# Patient Record
Sex: FEMALE | Race: BLACK OR AFRICAN AMERICAN | URBAN - METROPOLITAN AREA
[De-identification: names, ages, dates, MRNs, and addresses within clinical notes are randomized per-mention and may not be internally consistent; named-entity substitution may affect disease eponyms.]

---

## 2017-01-05 ENCOUNTER — INPATIENT (INPATIENT)
Facility: HOSPITAL | Age: 82
LOS: 5 days | Discharge: HOME HEALTH | End: 2017-01-11
Attending: INTERNAL MEDICINE | Admitting: INTERNAL MEDICINE
Payer: MEDICARE

## 2017-01-05 VITALS
RESPIRATION RATE: 17 BRPM | DIASTOLIC BLOOD PRESSURE: 63 MMHG | TEMPERATURE: 102 F | WEIGHT: 139.99 LBS | HEIGHT: 62 IN | HEART RATE: 115 BPM | OXYGEN SATURATION: 100 % | SYSTOLIC BLOOD PRESSURE: 139 MMHG

## 2017-01-05 DIAGNOSIS — I50.9 HEART FAILURE, UNSPECIFIED: Chronic | ICD-10-CM

## 2017-01-05 DIAGNOSIS — Z98.89 OTHER SPECIFIED POSTPROCEDURAL STATES: Chronic | ICD-10-CM

## 2017-01-05 DIAGNOSIS — E11.9 TYPE 2 DIABETES MELLITUS WITHOUT COMPLICATIONS: Chronic | ICD-10-CM

## 2017-01-05 DIAGNOSIS — Z82.49 FAMILY HISTORY OF ISCHEMIC HEART DISEASE AND OTHER DISEASES OF THE CIRCULATORY SYSTEM: Chronic | ICD-10-CM

## 2017-01-05 LAB
ACETONE SERPL-MCNC: NEGATIVE — SIGNIFICANT CHANGE UP
ALBUMIN SERPL ELPH-MCNC: 3.2 G/DL — LOW (ref 3.3–5)
ALP SERPL-CCNC: 105 U/L — SIGNIFICANT CHANGE UP (ref 40–120)
ALT FLD-CCNC: 17 U/L — SIGNIFICANT CHANGE UP (ref 12–78)
ANION GAP SERPL CALC-SCNC: 6 MMOL/L — SIGNIFICANT CHANGE UP (ref 5–17)
APPEARANCE UR: CLEAR — SIGNIFICANT CHANGE UP
APTT BLD: 35.6 SEC — SIGNIFICANT CHANGE UP (ref 27.5–37.4)
AST SERPL-CCNC: 43 U/L — HIGH (ref 15–37)
BACTERIA # UR AUTO: ABNORMAL
BASE EXCESS BLDA CALC-SCNC: 7.9 MMOL/L — HIGH (ref -2–2)
BILIRUB SERPL-MCNC: 0.5 MG/DL — SIGNIFICANT CHANGE UP (ref 0.2–1.2)
BILIRUB UR-MCNC: NEGATIVE — SIGNIFICANT CHANGE UP
BLOOD GAS COMMENTS: SIGNIFICANT CHANGE UP
BLOOD GAS SOURCE: SIGNIFICANT CHANGE UP
BUN SERPL-MCNC: 31 MG/DL — HIGH (ref 7–23)
CALCIUM SERPL-MCNC: 10.2 MG/DL — HIGH (ref 8.5–10.1)
CHLORIDE SERPL-SCNC: 97 MMOL/L — SIGNIFICANT CHANGE UP (ref 96–108)
CK MB BLD-MCNC: 1.9 % — SIGNIFICANT CHANGE UP (ref 0–3.5)
CK MB CFR SERPL CALC: 0.6 NG/ML — SIGNIFICANT CHANGE UP (ref 0.5–3.6)
CK SERPL-CCNC: 32 U/L — SIGNIFICANT CHANGE UP (ref 26–192)
CO2 SERPL-SCNC: 32 MMOL/L — HIGH (ref 22–31)
COLOR SPEC: YELLOW — SIGNIFICANT CHANGE UP
CREAT SERPL-MCNC: 4.13 MG/DL — HIGH (ref 0.5–1.3)
DIFF PNL FLD: NEGATIVE — SIGNIFICANT CHANGE UP
EPI CELLS # UR: SIGNIFICANT CHANGE UP
GLUCOSE SERPL-MCNC: 437 MG/DL — HIGH (ref 70–99)
GLUCOSE UR QL: 1000 MG/DL
HCO3 BLDA-SCNC: 31 MMOL/L — HIGH (ref 21–29)
HCT VFR BLD CALC: 34.6 % — SIGNIFICANT CHANGE UP (ref 34.5–45)
HGB BLD-MCNC: 11.6 G/DL — SIGNIFICANT CHANGE UP (ref 11.5–15.5)
HOROWITZ INDEX BLDA+IHG-RTO: 21 — SIGNIFICANT CHANGE UP
INR BLD: 1.04 RATIO — SIGNIFICANT CHANGE UP (ref 0.88–1.16)
KETONES UR-MCNC: NEGATIVE — SIGNIFICANT CHANGE UP
LACTATE SERPL-SCNC: 1.4 MMOL/L — SIGNIFICANT CHANGE UP (ref 0.7–2)
LACTATE SERPL-SCNC: 2.4 MMOL/L — HIGH (ref 0.7–2)
LEUKOCYTE ESTERASE UR-ACNC: ABNORMAL
LG PLATELETS BLD QL AUTO: SLIGHT — SIGNIFICANT CHANGE UP
LYMPHOCYTES # BLD AUTO: 8 % — LOW (ref 13–44)
MACROCYTES BLD QL: SLIGHT — SIGNIFICANT CHANGE UP
MCHC RBC-ENTMCNC: 29.3 PG — SIGNIFICANT CHANGE UP (ref 27–34)
MCHC RBC-ENTMCNC: 33.5 GM/DL — SIGNIFICANT CHANGE UP (ref 32–36)
MCV RBC AUTO: 87.5 FL — SIGNIFICANT CHANGE UP (ref 80–100)
MICROCYTES BLD QL: SLIGHT — SIGNIFICANT CHANGE UP
MONOCYTES NFR BLD AUTO: 5 % — SIGNIFICANT CHANGE UP (ref 2–14)
NEUTROPHILS NFR BLD AUTO: 82 % — HIGH (ref 43–77)
NEUTS BAND # BLD: 5 % — SIGNIFICANT CHANGE UP (ref 0–8)
NEUTS VAC BLD QL SMEAR: SLIGHT — SIGNIFICANT CHANGE UP
NITRITE UR-MCNC: NEGATIVE — SIGNIFICANT CHANGE UP
OVALOCYTES BLD QL SMEAR: SLIGHT — SIGNIFICANT CHANGE UP
PCO2 BLDA: 38 MMHG — SIGNIFICANT CHANGE UP (ref 32–46)
PH BLD: 7.53 — HIGH (ref 7.35–7.45)
PH UR: 8 — SIGNIFICANT CHANGE UP (ref 4.8–8)
PLAT MORPH BLD: NORMAL — SIGNIFICANT CHANGE UP
PLATELET # BLD AUTO: 245 K/UL — SIGNIFICANT CHANGE UP (ref 150–400)
PO2 BLDA: 70 MMHG — LOW (ref 74–108)
POLYCHROMASIA BLD QL SMEAR: SLIGHT — SIGNIFICANT CHANGE UP
POTASSIUM SERPL-MCNC: 4.2 MMOL/L — SIGNIFICANT CHANGE UP (ref 3.5–5.3)
POTASSIUM SERPL-SCNC: 4.2 MMOL/L — SIGNIFICANT CHANGE UP (ref 3.5–5.3)
PROT SERPL-MCNC: 8 GM/DL — SIGNIFICANT CHANGE UP (ref 6–8.3)
PROT UR-MCNC: 100 MG/DL
PROTHROM AB SERPL-ACNC: 11.6 SEC — SIGNIFICANT CHANGE UP (ref 10–13.1)
RBC # BLD: 3.95 M/UL — SIGNIFICANT CHANGE UP (ref 3.8–5.2)
RBC # FLD: 15.3 % — HIGH (ref 11–15)
RBC BLD AUTO: ABNORMAL
RBC CASTS # UR COMP ASSIST: NEGATIVE /HPF — SIGNIFICANT CHANGE UP (ref 0–4)
SAO2 % BLDA: 94 % — SIGNIFICANT CHANGE UP (ref 92–96)
SODIUM SERPL-SCNC: 135 MMOL/L — SIGNIFICANT CHANGE UP (ref 135–145)
SP GR SPEC: 1.01 — SIGNIFICANT CHANGE UP (ref 1.01–1.02)
TOXIC GRANULES BLD QL SMEAR: PRESENT — SIGNIFICANT CHANGE UP
TROPONIN I SERPL-MCNC: 0.11 NG/ML — HIGH (ref 0.01–0.04)
UROBILINOGEN FLD QL: NEGATIVE MG/DL — SIGNIFICANT CHANGE UP
WBC # BLD: 20 K/UL — HIGH (ref 3.8–10.5)
WBC # FLD AUTO: 20 K/UL — HIGH (ref 3.8–10.5)
WBC UR QL: SIGNIFICANT CHANGE UP

## 2017-01-05 PROCEDURE — 99285 EMERGENCY DEPT VISIT HI MDM: CPT

## 2017-01-05 PROCEDURE — 71010: CPT | Mod: 26

## 2017-01-05 PROCEDURE — 70450 CT HEAD/BRAIN W/O DYE: CPT | Mod: 26

## 2017-01-05 RX ORDER — PANTOPRAZOLE SODIUM 20 MG/1
40 TABLET, DELAYED RELEASE ORAL DAILY
Qty: 0 | Refills: 0 | Status: DISCONTINUED | OUTPATIENT
Start: 2017-01-05 | End: 2017-01-08

## 2017-01-05 RX ORDER — SODIUM CHLORIDE 9 MG/ML
1000 INJECTION INTRAMUSCULAR; INTRAVENOUS; SUBCUTANEOUS
Qty: 0 | Refills: 0 | Status: DISCONTINUED | OUTPATIENT
Start: 2017-01-05 | End: 2017-01-06

## 2017-01-05 RX ORDER — VANCOMYCIN HCL 1 G
500 VIAL (EA) INTRAVENOUS ONCE
Qty: 0 | Refills: 0 | Status: DISCONTINUED | OUTPATIENT
Start: 2017-01-05 | End: 2017-01-05

## 2017-01-05 RX ORDER — DEXTROSE 50 % IN WATER 50 %
25 SYRINGE (ML) INTRAVENOUS ONCE
Qty: 0 | Refills: 0 | Status: DISCONTINUED | OUTPATIENT
Start: 2017-01-05 | End: 2017-01-11

## 2017-01-05 RX ORDER — INSULIN LISPRO 100/ML
VIAL (ML) SUBCUTANEOUS
Qty: 0 | Refills: 0 | Status: DISCONTINUED | OUTPATIENT
Start: 2017-01-05 | End: 2017-01-11

## 2017-01-05 RX ORDER — ACETAMINOPHEN 500 MG
975 TABLET ORAL ONCE
Qty: 0 | Refills: 0 | Status: COMPLETED | OUTPATIENT
Start: 2017-01-05 | End: 2017-01-05

## 2017-01-05 RX ORDER — GLUCAGON INJECTION, SOLUTION 0.5 MG/.1ML
1 INJECTION, SOLUTION SUBCUTANEOUS ONCE
Qty: 0 | Refills: 0 | Status: DISCONTINUED | OUTPATIENT
Start: 2017-01-05 | End: 2017-01-11

## 2017-01-05 RX ORDER — PIPERACILLIN AND TAZOBACTAM 4; .5 G/20ML; G/20ML
3.38 INJECTION, POWDER, LYOPHILIZED, FOR SOLUTION INTRAVENOUS ONCE
Qty: 0 | Refills: 0 | Status: COMPLETED | OUTPATIENT
Start: 2017-01-05 | End: 2017-01-05

## 2017-01-05 RX ORDER — PIPERACILLIN AND TAZOBACTAM 4; .5 G/20ML; G/20ML
3.38 INJECTION, POWDER, LYOPHILIZED, FOR SOLUTION INTRAVENOUS EVERY 12 HOURS
Qty: 0 | Refills: 0 | Status: DISCONTINUED | OUTPATIENT
Start: 2017-01-05 | End: 2017-01-11

## 2017-01-05 RX ORDER — SODIUM CHLORIDE 9 MG/ML
3 INJECTION INTRAMUSCULAR; INTRAVENOUS; SUBCUTANEOUS ONCE
Qty: 0 | Refills: 0 | Status: COMPLETED | OUTPATIENT
Start: 2017-01-05 | End: 2017-01-05

## 2017-01-05 RX ORDER — HEPARIN SODIUM 5000 [USP'U]/ML
5000 INJECTION INTRAVENOUS; SUBCUTANEOUS EVERY 12 HOURS
Qty: 0 | Refills: 0 | Status: DISCONTINUED | OUTPATIENT
Start: 2017-01-05 | End: 2017-01-11

## 2017-01-05 RX ORDER — MORPHINE SULFATE 50 MG/1
2 CAPSULE, EXTENDED RELEASE ORAL ONCE
Qty: 0 | Refills: 0 | Status: DISCONTINUED | OUTPATIENT
Start: 2017-01-05 | End: 2017-01-05

## 2017-01-05 RX ORDER — DEXTROSE 50 % IN WATER 50 %
12.5 SYRINGE (ML) INTRAVENOUS ONCE
Qty: 0 | Refills: 0 | Status: DISCONTINUED | OUTPATIENT
Start: 2017-01-05 | End: 2017-01-11

## 2017-01-05 RX ORDER — SODIUM CHLORIDE 9 MG/ML
1000 INJECTION INTRAMUSCULAR; INTRAVENOUS; SUBCUTANEOUS ONCE
Qty: 0 | Refills: 0 | Status: COMPLETED | OUTPATIENT
Start: 2017-01-05 | End: 2017-01-05

## 2017-01-05 RX ORDER — ACETAMINOPHEN 500 MG
650 TABLET ORAL EVERY 6 HOURS
Qty: 0 | Refills: 0 | Status: DISCONTINUED | OUTPATIENT
Start: 2017-01-05 | End: 2017-01-11

## 2017-01-05 RX ORDER — VANCOMYCIN HCL 1 G
1000 VIAL (EA) INTRAVENOUS ONCE
Qty: 0 | Refills: 0 | Status: COMPLETED | OUTPATIENT
Start: 2017-01-05 | End: 2017-01-05

## 2017-01-05 RX ORDER — DEXTROSE 50 % IN WATER 50 %
1 SYRINGE (ML) INTRAVENOUS ONCE
Qty: 0 | Refills: 0 | Status: DISCONTINUED | OUTPATIENT
Start: 2017-01-05 | End: 2017-01-11

## 2017-01-05 RX ORDER — SODIUM CHLORIDE 9 MG/ML
1000 INJECTION, SOLUTION INTRAVENOUS
Qty: 0 | Refills: 0 | Status: DISCONTINUED | OUTPATIENT
Start: 2017-01-05 | End: 2017-01-11

## 2017-01-05 RX ORDER — ONDANSETRON 8 MG/1
8 TABLET, FILM COATED ORAL ONCE
Qty: 0 | Refills: 0 | Status: COMPLETED | OUTPATIENT
Start: 2017-01-05 | End: 2017-01-05

## 2017-01-05 RX ADMIN — ONDANSETRON 8 MILLIGRAM(S): 8 TABLET, FILM COATED ORAL at 15:13

## 2017-01-05 RX ADMIN — MORPHINE SULFATE 2 MILLIGRAM(S): 50 CAPSULE, EXTENDED RELEASE ORAL at 15:43

## 2017-01-05 RX ADMIN — MORPHINE SULFATE 2 MILLIGRAM(S): 50 CAPSULE, EXTENDED RELEASE ORAL at 15:13

## 2017-01-05 RX ADMIN — SODIUM CHLORIDE 3 MILLILITER(S): 9 INJECTION INTRAMUSCULAR; INTRAVENOUS; SUBCUTANEOUS at 13:50

## 2017-01-05 RX ADMIN — SODIUM CHLORIDE 60 MILLILITER(S): 9 INJECTION INTRAMUSCULAR; INTRAVENOUS; SUBCUTANEOUS at 23:12

## 2017-01-05 RX ADMIN — Medication 975 MILLIGRAM(S): at 17:18

## 2017-01-05 RX ADMIN — SODIUM CHLORIDE 1000 MILLILITER(S): 9 INJECTION INTRAMUSCULAR; INTRAVENOUS; SUBCUTANEOUS at 13:50

## 2017-01-05 RX ADMIN — PIPERACILLIN AND TAZOBACTAM 25 GRAM(S): 4; .5 INJECTION, POWDER, LYOPHILIZED, FOR SOLUTION INTRAVENOUS at 21:28

## 2017-01-05 RX ADMIN — Medication 250 MILLIGRAM(S): at 17:03

## 2017-01-05 RX ADMIN — PIPERACILLIN AND TAZOBACTAM 200 GRAM(S): 4; .5 INJECTION, POWDER, LYOPHILIZED, FOR SOLUTION INTRAVENOUS at 15:13

## 2017-01-05 NOTE — ED ADULT NURSE REASSESSMENT NOTE - NS ED NURSE REASSESS COMMENT FT1
Pt is a dialysis pt with AV shunt on left arm.  EMS inserted IV access on left arm.  Informed Dr. Flores of IV access.  Per Dr. Flores, I can administer antibiotic via IV access on left arm.

## 2017-01-05 NOTE — ED STATDOCS - OBJECTIVE STATEMENT
86 year old female presents with h/o HTN, DM, hypercholesterolemia, CHF, ARF presents today BIBA from home c/o not being herself, pt had dialysis yesterday and able to eat dinner, this morning her home aide came in at 8:50am and noticed that she was not talking just mumbling, she did call her daughter who tried to speak to her on the phone and agreed she did not sound like herself, she got home at 9:30am and noticed the pt was also lethargic and felt warm when she sat next to her, she watched her at home for few hours,

## 2017-01-05 NOTE — ED ADULT NURSE NOTE - PMH
Acute Renal Insufficiency    CHF (Congestive Heart Failure)    Diabetes Mellitus    Dyslipidemia    H/O: Hypothyroidism    Hypertension    Seizure

## 2017-01-05 NOTE — ED ADULT TRIAGE NOTE - CHIEF COMPLAINT QUOTE
AMS since this morning upon waking up per EMS stated by family. Pt received dialysis yesterday blood sugar after.  per EMS

## 2017-01-05 NOTE — ED PROVIDER NOTE - OBJECTIVE STATEMENT
86 year old female presents today BIBA from home accompanied with her daughter and home aide c/o pt not being herself, the aide called the daugter at 8:50am informing her that she was not responding, pt normally is able to verbalize, her daughter got to the home at 9:30am and found her mother to be lethargic and not answering questions, pt is normally verbal, she observed her for the next few hours, she did not improve, her daughter noticed that she was hot (-) vomiting (-) diarrhea, pt was her normal self yesterday, she had her dialysis and able to eat dinner

## 2017-01-06 DIAGNOSIS — R10.10 UPPER ABDOMINAL PAIN, UNSPECIFIED: ICD-10-CM

## 2017-01-06 DIAGNOSIS — N18.6 END STAGE RENAL DISEASE: ICD-10-CM

## 2017-01-06 DIAGNOSIS — E13.00 OTHER SPECIFIED DIABETES MELLITUS WITH HYPEROSMOLARITY WITHOUT NONKETOTIC HYPERGLYCEMIC-HYPEROSMOLAR COMA (NKHHC): ICD-10-CM

## 2017-01-06 DIAGNOSIS — A41.9 SEPSIS, UNSPECIFIED ORGANISM: ICD-10-CM

## 2017-01-06 DIAGNOSIS — R56.9 UNSPECIFIED CONVULSIONS: ICD-10-CM

## 2017-01-06 DIAGNOSIS — R50.9 FEVER, UNSPECIFIED: ICD-10-CM

## 2017-01-06 LAB
ANION GAP SERPL CALC-SCNC: 11 MMOL/L — SIGNIFICANT CHANGE UP (ref 5–17)
BASOPHILS # BLD AUTO: 0.1 K/UL — SIGNIFICANT CHANGE UP (ref 0–0.2)
BASOPHILS NFR BLD AUTO: 0.3 % — SIGNIFICANT CHANGE UP (ref 0–2)
BUN SERPL-MCNC: 39 MG/DL — HIGH (ref 7–23)
CALCIUM SERPL-MCNC: 10 MG/DL — SIGNIFICANT CHANGE UP (ref 8.5–10.1)
CHLORIDE SERPL-SCNC: 102 MMOL/L — SIGNIFICANT CHANGE UP (ref 96–108)
CO2 SERPL-SCNC: 29 MMOL/L — SIGNIFICANT CHANGE UP (ref 22–31)
CREAT SERPL-MCNC: 4.48 MG/DL — HIGH (ref 0.5–1.3)
EOSINOPHIL # BLD AUTO: 0.3 K/UL — SIGNIFICANT CHANGE UP (ref 0–0.5)
EOSINOPHIL NFR BLD AUTO: 1.3 % — SIGNIFICANT CHANGE UP (ref 0–6)
GLUCOSE SERPL-MCNC: 154 MG/DL — HIGH (ref 70–99)
HCT VFR BLD CALC: 29.8 % — LOW (ref 34.5–45)
HGB BLD-MCNC: 9.9 G/DL — LOW (ref 11.5–15.5)
LYMPHOCYTES # BLD AUTO: 1.5 K/UL — SIGNIFICANT CHANGE UP (ref 1–3.3)
LYMPHOCYTES # BLD AUTO: 7.2 % — LOW (ref 13–44)
MCHC RBC-ENTMCNC: 29.4 PG — SIGNIFICANT CHANGE UP (ref 27–34)
MCHC RBC-ENTMCNC: 33.2 GM/DL — SIGNIFICANT CHANGE UP (ref 32–36)
MCV RBC AUTO: 88.6 FL — SIGNIFICANT CHANGE UP (ref 80–100)
MONOCYTES # BLD AUTO: 1 K/UL — HIGH (ref 0–0.9)
MONOCYTES NFR BLD AUTO: 4.7 % — SIGNIFICANT CHANGE UP (ref 2–14)
NEUTROPHILS # BLD AUTO: 17.9 K/UL — HIGH (ref 1.8–7.4)
NEUTROPHILS NFR BLD AUTO: 86.4 % — HIGH (ref 43–77)
PLATELET # BLD AUTO: 212 K/UL — SIGNIFICANT CHANGE UP (ref 150–400)
POTASSIUM SERPL-MCNC: 3.9 MMOL/L — SIGNIFICANT CHANGE UP (ref 3.5–5.3)
POTASSIUM SERPL-SCNC: 3.9 MMOL/L — SIGNIFICANT CHANGE UP (ref 3.5–5.3)
PROCALCITONIN SERPL-MCNC: 1.17 NG/ML — HIGH (ref 0–0.05)
RBC # BLD: 3.37 M/UL — LOW (ref 3.8–5.2)
RBC # FLD: 15.5 % — HIGH (ref 11–15)
SODIUM SERPL-SCNC: 142 MMOL/L — SIGNIFICANT CHANGE UP (ref 135–145)
WBC # BLD: 20.7 K/UL — HIGH (ref 3.8–10.5)
WBC # FLD AUTO: 20.7 K/UL — HIGH (ref 3.8–10.5)

## 2017-01-06 PROCEDURE — 74176 CT ABD & PELVIS W/O CONTRAST: CPT | Mod: 26

## 2017-01-06 RX ORDER — IOHEXOL 300 MG/ML
30 INJECTION, SOLUTION INTRAVENOUS ONCE
Qty: 0 | Refills: 0 | Status: COMPLETED | OUTPATIENT
Start: 2017-01-06 | End: 2017-01-06

## 2017-01-06 RX ORDER — CLOPIDOGREL BISULFATE 75 MG/1
75 TABLET, FILM COATED ORAL DAILY
Qty: 0 | Refills: 0 | Status: DISCONTINUED | OUTPATIENT
Start: 2017-01-06 | End: 2017-01-11

## 2017-01-06 RX ORDER — LEVETIRACETAM 250 MG/1
750 TABLET, FILM COATED ORAL
Qty: 0 | Refills: 0 | Status: DISCONTINUED | OUTPATIENT
Start: 2017-01-06 | End: 2017-01-11

## 2017-01-06 RX ORDER — SODIUM CHLORIDE 9 MG/ML
1000 INJECTION, SOLUTION INTRAVENOUS
Qty: 0 | Refills: 0 | Status: DISCONTINUED | OUTPATIENT
Start: 2017-01-06 | End: 2017-01-11

## 2017-01-06 RX ADMIN — LEVETIRACETAM 750 MILLIGRAM(S): 250 TABLET, FILM COATED ORAL at 18:37

## 2017-01-06 RX ADMIN — HEPARIN SODIUM 5000 UNIT(S): 5000 INJECTION INTRAVENOUS; SUBCUTANEOUS at 05:06

## 2017-01-06 RX ADMIN — Medication 2: at 18:24

## 2017-01-06 RX ADMIN — HEPARIN SODIUM 5000 UNIT(S): 5000 INJECTION INTRAVENOUS; SUBCUTANEOUS at 18:24

## 2017-01-06 RX ADMIN — PIPERACILLIN AND TAZOBACTAM 25 GRAM(S): 4; .5 INJECTION, POWDER, LYOPHILIZED, FOR SOLUTION INTRAVENOUS at 10:14

## 2017-01-06 RX ADMIN — SODIUM CHLORIDE 60 MILLILITER(S): 9 INJECTION INTRAMUSCULAR; INTRAVENOUS; SUBCUTANEOUS at 05:05

## 2017-01-06 RX ADMIN — IOHEXOL 30 MILLILITER(S): 300 INJECTION, SOLUTION INTRAVENOUS at 20:43

## 2017-01-06 RX ADMIN — PIPERACILLIN AND TAZOBACTAM 25 GRAM(S): 4; .5 INJECTION, POWDER, LYOPHILIZED, FOR SOLUTION INTRAVENOUS at 20:45

## 2017-01-06 RX ADMIN — CLOPIDOGREL BISULFATE 75 MILLIGRAM(S): 75 TABLET, FILM COATED ORAL at 18:36

## 2017-01-06 RX ADMIN — PANTOPRAZOLE SODIUM 40 MILLIGRAM(S): 20 TABLET, DELAYED RELEASE ORAL at 11:23

## 2017-01-06 NOTE — CONSULT NOTE ADULT - SUBJECTIVE AND OBJECTIVE BOX
HPI:  88yo, BF with h/o ESRD 0n dialysis, Sz, DM2,HTN.   BIBA from home accompanied with her daughter and home aide c/o pt not being herself, the aide called the daugter at 8:50am informing her that she was not responding, pt normally is able to verbalize, her daughter got to the home at 9:30am and found her mother to be lethargic and not answering questions, pt is normally verbal, she observed her for the next few hours, she did not improve.  Daughter does not note  any cough, fever, chills, sob, cp.  In ER, patient lethargic with wbc 20.     PAST MEDICAL & SURGICAL HISTORY:  Seizure  H/O: Hypothyroidism  Hypertension  Acute Renal Insufficiency  CHF (Congestive Heart Failure)  Diabetes Mellitus  Dyslipidemia  S/P cardiac cath: 1 stent 10 years ago  Diabetes  FH: HTN (hypertension)  Acute CHF  No Past Surgical History      SOCHX:  no tobacco,  -no  alcohol    FMHX: FA/MO  -non contributory       Recent Travel:    Immunizations:    Allergies    No Known Allergies    Intolerances        MEDICATIONS  (STANDING):  heparin  Injectable 5000Unit(s) SubCutaneous every 12 hours  insulin lispro (HumaLOG) corrective regimen sliding scale  SubCutaneous three times a day before meals  dextrose 5%. 1000milliLiter(s) IV Continuous <Continuous>  dextrose 50% Injectable 12.5Gram(s) IV Push once  dextrose 50% Injectable 25Gram(s) IV Push once  piperacillin/tazobactam IVPB. 3.375Gram(s) IV Intermittent every 12 hours  pantoprazole  Injectable 40milliGRAM(s) IV Push daily  sodium chloride 0.45%. 1000milliLiter(s) IV Continuous <Continuous>  clopidogrel Tablet 75milliGRAM(s) Oral daily  levETIRAcetam 750milliGRAM(s) Oral two times a day    MEDICATIONS  (PRN):  acetaminophen  Suppository 650milliGRAM(s) Rectal every 6 hours PRN For Temp greater than 38 C (100.4 F)  dextrose Gel 1Dose(s) Oral once PRN Blood Glucose LESS THAN 70 milliGRAM(s)/deciliter  glucagon  Injectable 1milliGRAM(s) IntraMuscular once PRN Glucose LESS THAN 70 milligrams/deciliter      REVIEW OF SYSTEMS:  CONSTITUTIONAL: No fever, weight loss, or fatigue  EYES: No eye pain, visual disturbances, or discharge  ENMT:  No difficulty hearing, tinnitus, vertigo; No sinus or throat pain  NECK: No pain or stiffness  BREASTS: No pain, masses, or nipple discharge  RESPIRATORY: No cough, wheezing, chills or hemoptysis; No shortness of breath  CARDIOVASCULAR: No chest pain, palpitations, dizziness, or leg swelling  GASTROINTESTINAL: No abdominal or epigastric pain. No nausea, vomiting, or hematemesis; No diarrhea or constipation. No melena or hematochezia.  GENITOURINARY: No dysuria, frequency, hematuria, or incontinence  NEUROLOGICAL: No headaches, memory loss, loss of strength, numbness, or tremors  SKIN: No itching, burning, rashes, or lesions   LYMPH NODES: No enlarged glands  ENDOCRINE: No heat or cold intolerance; No hair loss  MUSCULOSKELETAL: No joint pain or swelling; No muscle, back, or extremity pain  PSYCHIATRIC: No depression, anxiety, mood swings, or difficulty sleeping  HEME/LYMPH: No easy bruising, or bleeding gums  ALLERGY AND IMMUNOLOGIC: No hives or eczema    VITAL SIGNS:    T(C): 36.7, Max: 37.7 ( @ 02:00)  T(F): 98, Max: 99.8 ( @ 02:00)  HR: 86 (80 - 90)  BP: 149/56 (142/58 - 153/57)  RR: 16 (16 - 18)  SpO2: 100% (98% - 100%)  Wt(kg): --    PHYSICAL EXAM:    GENERAL: NAD, well-groomed, well-developed  HEAD:  Atraumatic, Normocephalic  EYES: EOMI, PERRLA, conjunctiva and sclera clear  ENMT: No tonsillar erythema, exudates, or enlargement; Moist mucous membranes, Good dentition, No lesions  NECK: Supple, No JVD, Normal thyroid  NERVOUS SYSTEM:  Alert & Oriented X3, Good concentration; Motor Strength 5/5 B/L upper and lower extremities; DTRs 2+ intact and symmetric  CHEST/LUNG: Clear to percussion bilaterally; No rales, rhonchi, wheezing bilaterally  HEART: Regular rate and rhythm; No murmurs, rubs, or gallops  ABDOMEN: Soft, Nontender, Nondistended; Bowel sounds present  EXTREMITIES:  2+ Peripheral Pulses, No clubbing, cyanosis, or edema  LYMPH: No lymphadenopathy noted  SKIN: No rashes or lesions  BACK: no pressor sore     LABS:                         9.9    20.7  )-----------( 212      ( 2017 07:38 )             29.8     2017 07:38    142    |  102    |  39     ----------------------------<  154    3.9     |  29     |  4.48     Ca    10.0       2017 07:38    TPro  8.0    /  Alb  3.2    /  TBili  0.5    /  DBili  x      /  AST  43     /  ALT  17     /  AlkPhos  105    2017 14:15    LIVER FUNCTIONS - ( 2017 14:15 )  Alb: 3.2 g/dL / Pro: 8.0 gm/dL / ALK PHOS: 105 U/L / ALT: 17 U/L / AST: 43 U/L / GGT: x           PT/INR - ( 2017 14:15 )   PT: 11.6 sec;   INR: 1.04 ratio         PTT - ( 2017 14:15 )  PTT:35.6 sec  Urinalysis Basic - ( 2017 21:42 )    Color: Yellow / Appearance: Clear / S.010 / pH: x  Gluc: x / Ketone: Negative  / Bili: Negative / Urobili: Negative mg/dL   Blood: x / Protein: 100 mg/dL / Nitrite: Negative   Leuk Esterase: Trace / RBC: Negative /HPF / WBC 3-5   Sq Epi: x / Non Sq Epi: Occasional / Bacteria: Few      ABG - ( 2017 14:42 )  pH: x     /  pCO2: 38    /  pO2: 70    / HCO3: 31    / Base Excess: 7.9   /  SaO2: 94                CARDIAC MARKERS ( 2017 16:27 )  .112 ng/mL / x     / 32 U/L / x     / 0.6 ng/mL                                  Radiology: HPI:  88yo, BF with h/o ESRD 0n dialysis, Sz, DM2,HTN.   BIBA from home accompanied with her daughter and home aide c/o pt not being herself, the aide called the daugter at 8:50am informing her that she was not responding, pt normally is able to verbalize, her daughter got to the home at 9:30am and found her mother to be lethargic and not answering questions, pt is normally verbal, she observed her for the next few hours, she did not improve.  Daughter does not note  any cough, fever, chills, sob, cp.  In ER, patient lethargic with wbc 20.     PAST MEDICAL & SURGICAL HISTORY:  Seizure  H/O: Hypothyroidism  Hypertension  Acute Renal Insufficiency  CHF (Congestive Heart Failure)  Diabetes Mellitus  Dyslipidemia  S/P cardiac cath: 1 stent 10 years ago  Diabetes  FH: HTN (hypertension)  Acute CHF  No Past Surgical History      SOCHX:  no tobacco,  -no  alcohol    FMHX: FA/MO  -non contributory       Recent Travel:    Immunizations:    Allergies    No Known Allergies    Intolerances        MEDICATIONS  (STANDING):  heparin  Injectable 5000Unit(s) SubCutaneous every 12 hours  insulin lispro (HumaLOG) corrective regimen sliding scale  SubCutaneous three times a day before meals  dextrose 5%. 1000milliLiter(s) IV Continuous <Continuous>  dextrose 50% Injectable 12.5Gram(s) IV Push once  dextrose 50% Injectable 25Gram(s) IV Push once  piperacillin/tazobactam IVPB. 3.375Gram(s) IV Intermittent every 12 hours  pantoprazole  Injectable 40milliGRAM(s) IV Push daily  sodium chloride 0.45%. 1000milliLiter(s) IV Continuous <Continuous>  clopidogrel Tablet 75milliGRAM(s) Oral daily  levETIRAcetam 750milliGRAM(s) Oral two times a day    MEDICATIONS  (PRN):  acetaminophen  Suppository 650milliGRAM(s) Rectal every 6 hours PRN For Temp greater than 38 C (100.4 F)  dextrose Gel 1Dose(s) Oral once PRN Blood Glucose LESS THAN 70 milliGRAM(s)/deciliter  glucagon  Injectable 1milliGRAM(s) IntraMuscular once PRN Glucose LESS THAN 70 milligrams/deciliter      REVIEW OF SYSTEMS:  unable to obtain   from patient     VITAL SIGNS:    T(C): 36.7, Max: 37.7 ( @ 02:00)  T(F): 98, Max: 99.8 ( @ 02:00)  HR: 86 (80 - 90)  BP: 149/56 (142/58 - 153/57)  RR: 16 (16 - 18)  SpO2: 100% (98% - 100%)  Wt(kg): --    PHYSICAL EXAM:  appears in pain  GENERAL: NAD, well-groomed, well-developed  HEAD:  Atraumatic, Normocephalic  EYES: EOMI, PERRLA, conjunctiva and sclera clear  ENMT: No tonsillar erythema, exudates, or enlargement; Moist mucous membranes  NECK: Supple, No JVD, Normal thyroid  NERVOUS SYSTEM:  Alert ,  CHEST/LUNG: Clear to percussion bilaterally; No rales, rhonchi, wheezing bilaterally  HEART: Regular rate and rhythm; No murmurs, rubs, or gallops  ABDOMEN:  firm tender, distended; Bowel sounds plus  EXTREMITIES:  2+ Peripheral Pulses, No clubbing, cyanosis, or edema  LYMPH: No lymphadenopathy noted  SKIN: No rashes or lesions  BACK: no pressor sore     LABS:                         9.9    20.7  )-----------( 212      ( 2017 07:38 )             29.8     2017 07:38    142    |  102    |  39     ----------------------------<  154    3.9     |  29     |  4.48     Ca    10.0       2017 07:38    TPro  8.0    /  Alb  3.2    /  TBili  0.5    /  DBili  x      /  AST  43     /  ALT  17     /  AlkPhos  105    2017 14:15    LIVER FUNCTIONS - ( 2017 14:15 )  Alb: 3.2 g/dL / Pro: 8.0 gm/dL / ALK PHOS: 105 U/L / ALT: 17 U/L / AST: 43 U/L / GGT: x           PT/INR - ( 2017 14:15 )   PT: 11.6 sec;   INR: 1.04 ratio         PTT - ( 2017 14:15 )  PTT:35.6 sec  Urinalysis Basic - ( 2017 21:42 )    Color: Yellow / Appearance: Clear / S.010 / pH: x  Gluc: x / Ketone: Negative  / Bili: Negative / Urobili: Negative mg/dL   Blood: x / Protein: 100 mg/dL / Nitrite: Negative   Leuk Esterase: Trace / RBC: Negative /HPF / WBC 3-5   Sq Epi: x / Non Sq Epi: Occasional / Bacteria: Few      ABG - ( 2017 14:42 )  pH: x     /  pCO2: 38    /  pO2: 70    / HCO3: 31    / Base Excess: 7.9   /  SaO2: 94                CARDIAC MARKERS ( 2017 16:27 )  .112 ng/mL / x     / 32 U/L / x     / 0.6 ng/mL                                  Radiology:

## 2017-01-06 NOTE — DIETITIAN INITIAL EVALUATION ADULT. - PERTINENT LABORATORY DATA
01-06 Na142 mmol/L Glu 154 mg/dL<H> K+ 3.9 mmol/L Cr  4.48 mg/dL<H> BUN 39 mg/dL<H> Phos n/a   Alb n/a   PAB n/a; GFR 10L

## 2017-01-06 NOTE — CONSULT NOTE ADULT - SUBJECTIVE AND OBJECTIVE BOX
Patient is a 86y old  Female who presents with a chief complaint of weakness, fatigue, hyperglycemia  HPI:  86yo, BF with h/o ESRD 0n dialysis, Sz, DM2,HTN.  Known to me, seen at HD last wednesday without incident,  BIBA from home accompanied with her daughter and home aide c/o pt not being herself, the aide called the daugter at 8:50am informing her that she was not responding, pt normally is able to verbalize, her daughter got to the home at 9:30am and found her mother to be lethargic and not answering questions, pt is normally verbal, she observed her for the next few hours, she did not improve.  Daughter does not note  any cough, fever, chills, sob, cp.  In ER, patient lethargic with wbc 20. (2017 12:54)    PAST MEDICAL & SURGICAL HISTORY:  Seizure  H/O: Hypothyroidism  Hypertension  Acute Renal Insufficiency  CHF (Congestive Heart Failure)  Diabetes Mellitus  Dyslipidemia  S/P cardiac cath: 1 stent 10 years ago  Diabetes  FH: HTN (hypertension)  Acute CHF  No Past Surgical History    FAMILY HISTORY:    No Known Allergies    MEDICATIONS  (STANDING):  heparin  Injectable 5000Unit(s) SubCutaneous every 12 hours  insulin lispro (HumaLOG) corrective regimen sliding scale  SubCutaneous three times a day before meals  dextrose 5%. 1000milliLiter(s) IV Continuous <Continuous>  dextrose 50% Injectable 12.5Gram(s) IV Push once  dextrose 50% Injectable 25Gram(s) IV Push once  piperacillin/tazobactam IVPB. 3.375Gram(s) IV Intermittent every 12 hours  pantoprazole  Injectable 40milliGRAM(s) IV Push daily  sodium chloride 0.45%. 1000milliLiter(s) IV Continuous <Continuous>  clopidogrel Tablet 75milliGRAM(s) Oral daily  levETIRAcetam 750milliGRAM(s) Oral two times a day    MEDICATIONS  (PRN):  acetaminophen  Suppository 650milliGRAM(s) Rectal every 6 hours PRN For Temp greater than 38 C (100.4 F)  dextrose Gel 1Dose(s) Oral once PRN Blood Glucose LESS THAN 70 milliGRAM(s)/deciliter  glucagon  Injectable 1milliGRAM(s) IntraMuscular once PRN Glucose LESS THAN 70 milligrams/deciliter    Vital Signs Last 24 Hrs  T(C): 36.7, Max: 37.7 ( @ 02:00)  T(F): 98, Max: 99.8 ( @ 02:00)  HR: 86 (80 - 90)  BP: 149/56 (142/58 - 153/57)  BP(mean): --  RR: 16 (16 - 18)  SpO2: 100% (98% - 100%)    CAPILLARY BLOOD GLUCOSE  131 (2017 11:16)  161 (2017 06:04)  243 (2017 00:40)    PHYSICAL EXAM:      T(C): 36.7, Max: 37.7 ( @ 02:00)  HR: 86 (80 - 90)  BP: 149/56 (142/58 - 153/57)  RR: 16 (16 - 18)  SpO2: 100% (98% - 100%)  Wt(kg): --  Respiratory: clear anteriorly, decreased BS at bases  Cardiovascular: S1 S2  Gastrointestinal: soft NT ND +BS  Extremities:    tr edema LUE avf + b/t              2017 07:38    142    |  102    |  39     ----------------------------<  154    3.9     |  29     |  4.48     Ca    10.0       2017 07:38    TPro  8.0    /  Alb  3.2    /  TBili  0.5    /  DBili  x      /  AST  43     /  ALT  17     /  AlkPhos  105    2017 14:15                          9.9    20.7  )-----------( 212      ( 2017 07:38 )             29.8     Urinalysis Basic - ( 2017 21:42 )    Color: Yellow / Appearance: Clear / S.010 / pH: x  Gluc: x / Ketone: Negative  / Bili: Negative / Urobili: Negative mg/dL   Blood: x / Protein: 100 mg/dL / Nitrite: Negative   Leuk Esterase: Trace / RBC: Negative /HPF / WBC 3-5   Sq Epi: x / Non Sq Epi: Occasional / Bacteria: Few      ABG - ( 2017 14:42 )  pH: x     /  pCO2: 38    /  pO2: 70    / HCO3: 31    / Base Excess: 7.9   /  SaO2: 94            Assessment and Plan    Hyperglycemia, leukocytosis, sepsis; ESRD.   Will hold HD today.  IV abx,; cultures pending; neuro evaluation.  D/C brenda. Patient is a 86y old  Female who presents with a chief complaint of weakness, fatigue, hyperglycemia  HPI:  88yo, BF with h/o ESRD 0n dialysis, Sz, DM2,HTN.  Known to me, seen at HD last wednesday without incident,  BIBA from home accompanied with her daughter and home aide c/o pt not being herself, the aide called the daugter at 8:50am informing her that she was not responding, pt normally is able to verbalize, her daughter got to the home at 9:30am and found her mother to be lethargic and not answering questions, pt is normally verbal, she observed her for the next few hours, she did not improve.  Daughter does not note  any cough, fever, chills, sob, cp.  In ER, patient lethargic with wbc 20. (2017 12:54)    PAST MEDICAL & SURGICAL HISTORY:  Seizure  H/O: Hypothyroidism  Hypertension  Acute Renal Insufficiency  CHF (Congestive Heart Failure)  Diabetes Mellitus  Dyslipidemia  S/P cardiac cath: 1 stent 10 years ago  Diabetes  FH: HTN (hypertension)  Acute CHF  No Past Surgical History    FAMILY HISTORY:    No Known Allergies    MEDICATIONS  (STANDING):  heparin  Injectable 5000Unit(s) SubCutaneous every 12 hours  insulin lispro (HumaLOG) corrective regimen sliding scale  SubCutaneous three times a day before meals  dextrose 5%. 1000milliLiter(s) IV Continuous <Continuous>  dextrose 50% Injectable 12.5Gram(s) IV Push once  dextrose 50% Injectable 25Gram(s) IV Push once  piperacillin/tazobactam IVPB. 3.375Gram(s) IV Intermittent every 12 hours  pantoprazole  Injectable 40milliGRAM(s) IV Push daily  sodium chloride 0.45%. 1000milliLiter(s) IV Continuous <Continuous>  clopidogrel Tablet 75milliGRAM(s) Oral daily  levETIRAcetam 750milliGRAM(s) Oral two times a day    MEDICATIONS  (PRN):  acetaminophen  Suppository 650milliGRAM(s) Rectal every 6 hours PRN For Temp greater than 38 C (100.4 F)  dextrose Gel 1Dose(s) Oral once PRN Blood Glucose LESS THAN 70 milliGRAM(s)/deciliter  glucagon  Injectable 1milliGRAM(s) IntraMuscular once PRN Glucose LESS THAN 70 milligrams/deciliter    Vital Signs Last 24 Hrs  T(C): 36.7, Max: 37.7 ( @ 02:00)  T(F): 98, Max: 99.8 ( @ 02:00)  HR: 86 (80 - 90)  BP: 149/56 (142/58 - 153/57)  BP(mean): --  RR: 16 (16 - 18)  SpO2: 100% (98% - 100%)    CAPILLARY BLOOD GLUCOSE  131 (2017 11:16)  161 (2017 06:04)  243 (2017 00:40)    PHYSICAL EXAM:      T(C): 36.7, Max: 37.7 ( @ 02:00)  HR: 86 (80 - 90)  BP: 149/56 (142/58 - 153/57)  RR: 16 (16 - 18)  SpO2: 100% (98% - 100%)  Wt(kg): --  Respiratory: clear anteriorly, decreased BS at bases  Cardiovascular: S1 S2  Gastrointestinal: soft NT mild diffuse tenderness +BS  Extremities:    tr edema LUE avf + b/t              2017 07:38    142    |  102    |  39     ----------------------------<  154    3.9     |  29     |  4.48     Ca    10.0       2017 07:38    TPro  8.0    /  Alb  3.2    /  TBili  0.5    /  DBili  x      /  AST  43     /  ALT  17     /  AlkPhos  105    2017 14:15                          9.9    20.7  )-----------( 212      ( 2017 07:38 )             29.8     Urinalysis Basic - ( 2017 21:42 )    Color: Yellow / Appearance: Clear / S.010 / pH: x  Gluc: x / Ketone: Negative  / Bili: Negative / Urobili: Negative mg/dL   Blood: x / Protein: 100 mg/dL / Nitrite: Negative   Leuk Esterase: Trace / RBC: Negative /HPF / WBC 3-5   Sq Epi: x / Non Sq Epi: Occasional / Bacteria: Few      ABG - ( 2017 14:42 )  pH: x     /  pCO2: 38    /  pO2: 70    / HCO3: 31    / Base Excess: 7.9   /  SaO2: 94            Assessment and Plan    Hyperglycemia, leukocytosis, sepsis; ESRD.   Will hold HD today.  IV abx,; cultures pending; neuro evaluation.  D/C brenda. Patient is a 86y old  Female who presents with a chief complaint of weakness, fatigue, hyperglycemia  HPI:  88yo, BF with h/o ESRD 0n dialysis, Sz, DM2,HTN.  Known to me, seen at HD last wednesday without incident,  BIBA from home accompanied with her daughter and home aide c/o pt not being herself, the aide called the daugter at 8:50am informing her that she was not responding, pt normally is able to verbalize, her daughter got to the home at 9:30am and found her mother to be lethargic and not answering questions, pt is normally verbal, she observed her for the next few hours, she did not improve.  Daughter does not note  any cough, fever, chills, sob, cp.  In ER, patient lethargic with wbc 20. (2017 12:54)    PAST MEDICAL & SURGICAL HISTORY:  Seizure  H/O: Hypothyroidism  Hypertension  Acute Renal Insufficiency  CHF (Congestive Heart Failure)  Diabetes Mellitus  Dyslipidemia  S/P cardiac cath: 1 stent 10 years ago  Diabetes  FH: HTN (hypertension)  Acute CHF  No Past Surgical History    FAMILY HISTORY:    No Known Allergies    MEDICATIONS  (STANDING):  heparin  Injectable 5000Unit(s) SubCutaneous every 12 hours  insulin lispro (HumaLOG) corrective regimen sliding scale  SubCutaneous three times a day before meals  dextrose 5%. 1000milliLiter(s) IV Continuous <Continuous>  dextrose 50% Injectable 12.5Gram(s) IV Push once  dextrose 50% Injectable 25Gram(s) IV Push once  piperacillin/tazobactam IVPB. 3.375Gram(s) IV Intermittent every 12 hours  pantoprazole  Injectable 40milliGRAM(s) IV Push daily  sodium chloride 0.45%. 1000milliLiter(s) IV Continuous <Continuous>  clopidogrel Tablet 75milliGRAM(s) Oral daily  levETIRAcetam 750milliGRAM(s) Oral two times a day    MEDICATIONS  (PRN):  acetaminophen  Suppository 650milliGRAM(s) Rectal every 6 hours PRN For Temp greater than 38 C (100.4 F)  dextrose Gel 1Dose(s) Oral once PRN Blood Glucose LESS THAN 70 milliGRAM(s)/deciliter  glucagon  Injectable 1milliGRAM(s) IntraMuscular once PRN Glucose LESS THAN 70 milligrams/deciliter    Vital Signs Last 24 Hrs  T(C): 36.7, Max: 37.7 ( @ 02:00)  T(F): 98, Max: 99.8 ( @ 02:00)  HR: 86 (80 - 90)  BP: 149/56 (142/58 - 153/57)  BP(mean): --  RR: 16 (16 - 18)  SpO2: 100% (98% - 100%)    CAPILLARY BLOOD GLUCOSE  131 (2017 11:16)  161 (2017 06:04)  243 (2017 00:40)    PHYSICAL EXAM:      T(C): 36.7, Max: 37.7 ( @ 02:00)  HR: 86 (80 - 90)  BP: 149/56 (142/58 - 153/57)  RR: 16 (16 - 18)  SpO2: 100% (98% - 100%)  Wt(kg): --  Respiratory: clear anteriorly, decreased BS at bases  Cardiovascular: S1 S2  Gastrointestinal: soft NT mild diffuse tenderness +BS  Extremities:    tr edema LUE avf + b/t              2017 07:38    142    |  102    |  39     ----------------------------<  154    3.9     |  29     |  4.48     Ca    10.0       2017 07:38    TPro  8.0    /  Alb  3.2    /  TBili  0.5    /  DBili  x      /  AST  43     /  ALT  17     /  AlkPhos  105    2017 14:15                          9.9    20.7  )-----------( 212      ( 2017 07:38 )             29.8     Urinalysis Basic - ( 2017 21:42 )    Color: Yellow / Appearance: Clear / S.010 / pH: x  Gluc: x / Ketone: Negative  / Bili: Negative / Urobili: Negative mg/dL   Blood: x / Protein: 100 mg/dL / Nitrite: Negative   Leuk Esterase: Trace / RBC: Negative /HPF / WBC 3-5   Sq Epi: x / Non Sq Epi: Occasional / Bacteria: Few      ABG - ( 2017 14:42 )  pH: x     /  pCO2: 38    /  pO2: 70    / HCO3: 31    / Base Excess: 7.9   /  SaO2: 94            Assessment and Plan    Hyperglycemia, leukocytosis, sepsis; abd pain. ESRD.   Will hold HD today.  IV abx,; cultures pending; neuro evaluation.  CT oral and IV contrast, will dialyze 1-7.  D/C gutierrez.

## 2017-01-06 NOTE — CONSULT NOTE ADULT - SUBJECTIVE AND OBJECTIVE BOX
Dr. Leong contact information: Office: [phone] Cell: [phone]    GENERAL SURGERY CONSULT NOTE    Patient is a 86y old  Female who presents with a chief complaint of     HPI:  86yo, BF with h/o ESRD 0n dialysis, Sz, DM2,HTN.   BIBA from home accompanied with her daughter and home aide c/o pt not being herself, the aide called the daugter at 8:50am informing her that she was not responding, pt normally is able to verbalize, her daughter got to the home at 9:30am and found her mother to be lethargic and not answering questions, pt is normally verbal, she observed her for the next few hours, she did not improve.  Daughter does not note  any cough, fever, chills, sob, cp.  In ER, patient lethargic with wbc 20. (2017 12:54)      PAST MEDICAL & SURGICAL HISTORY:  Seizure  H/O: Hypothyroidism  Hypertension  Acute Renal Insufficiency  CHF (Congestive Heart Failure)  Diabetes Mellitus  Dyslipidemia  S/P cardiac cath: 1 stent 10 years ago  Diabetes  FH: HTN (hypertension)  Acute CHF  No Past Surgical History      Review of Systems:    I have reviewed 9 systems with the patient and the only positive findings were     MEDICATIONS  (STANDING):  heparin  Injectable 5000Unit(s) SubCutaneous every 12 hours  insulin lispro (HumaLOG) corrective regimen sliding scale  SubCutaneous three times a day before meals  dextrose 5%. 1000milliLiter(s) IV Continuous <Continuous>  dextrose 50% Injectable 12.5Gram(s) IV Push once  dextrose 50% Injectable 25Gram(s) IV Push once  piperacillin/tazobactam IVPB. 3.375Gram(s) IV Intermittent every 12 hours  pantoprazole  Injectable 40milliGRAM(s) IV Push daily  sodium chloride 0.45%. 1000milliLiter(s) IV Continuous <Continuous>  clopidogrel Tablet 75milliGRAM(s) Oral daily  levETIRAcetam 750milliGRAM(s) Oral two times a day  iohexol 300 mG (iodine)/mL Oral Solution 30milliLiter(s) Oral once    MEDICATIONS  (PRN):  acetaminophen  Suppository 650milliGRAM(s) Rectal every 6 hours PRN For Temp greater than 38 C (100.4 F)  dextrose Gel 1Dose(s) Oral once PRN Blood Glucose LESS THAN 70 milliGRAM(s)/deciliter  glucagon  Injectable 1milliGRAM(s) IntraMuscular once PRN Glucose LESS THAN 70 milligrams/deciliter      Allergies    No Known Allergies    Intolerances        SOCIAL HISTORY          Smoking: Yes [ ]  No [ ]   ______pk yrs          ETOH  Yes [ ]  No [ ]  Social [ ]          DRUGS:  Yes [ ]  No [ ]  if so what______________    FAMILY HISTORY:      Vital Signs Last 24 Hrs  T(C): 37.1, Max: 37.7 ( @ 02:00)  T(F): 98.8, Max: 99.8 ( @ 02:00)  HR: 79 (79 - 90)  BP: 146/58 (142/58 - 153/57)  BP(mean): --  RR: 17 (16 - 18)  SpO2: 100% (98% - 100%)    Physical Exam:    General:  Appears stated age, well-groomed, well-nourished, no distress  Eyes : ZENA  HENT:  WNL, no JVD  Chest:  clear breath sounds  Cardiovascular:  Regular rate & rhythm  Abdomen:    Extremities:    Skin:  No rash  Musculoskeletal:  normal strength  Neuro/Psych:  Alert, oriented tp time, place and person       LABS:                        9.9    20.7  )-----------( 212      ( 2017 07:38 )             29.8     2017 07:38    142    |  102    |  39     ----------------------------<  154    3.9     |  29     |  4.48     Ca    10.0       2017 07:38    TPro  8.0    /  Alb  3.2    /  TBili  0.5    /  DBili  x      /  AST  43     /  ALT  17     /  AlkPhos  105    2017 14:15    PT/INR - ( 2017 14:15 )   PT: 11.6 sec;   INR: 1.04 ratio         PTT - ( 2017 14:15 )  PTT:35.6 sec  Urinalysis Basic - ( 2017 21:42 )    Color: Yellow / Appearance: Clear / S.010 / pH: x  Gluc: x / Ketone: Negative  / Bili: Negative / Urobili: Negative mg/dL   Blood: x / Protein: 100 mg/dL / Nitrite: Negative   Leuk Esterase: Trace / RBC: Negative /HPF / WBC 3-5   Sq Epi: x / Non Sq Epi: Occasional / Bacteria: Few        RADIOLOGY & ADDITIONAL STUDIES:ABDOMINAL WALL: Tiny fat-containing umbilical hernia and small   fat-containing left internal hernia.  BONES: Multiple old fractures of the inferior posterior right ribs. There   are multilevel degenerative changes of the spine.    IMPRESSION:     Mildly distended gallbladder with multiple small layering stones.   However, there is no gallbladder wall thickening or pericholecystic   fluid.     Small amount of air within the urinary bladder. Correlate with urinalysis   and with history of recent instrumentation.        Risks, benefits, and alternatives to treatment discussed. All questions answered with understanding.

## 2017-01-06 NOTE — H&P ADULT. - HISTORY OF PRESENT ILLNESS
86yo, BF with h/o ESRD 0n dialysis, Sz, DM2,HTN.   BIBA from home accompanied with her daughter and home aide c/o pt not being herself, the aide called the daugter at 8:50am informing her that she was not responding, pt normally is able to verbalize, her daughter got to the home at 9:30am and found her mother to be lethargic and not answering questions, pt is normally verbal, she observed her for the next few hours, she did not improve.  Daughter does not note  any cough, fever, chills, sob, cp.  In ER, patient lethargic with wbc 20.

## 2017-01-06 NOTE — DIETITIAN INITIAL EVALUATION ADULT. - NS AS NUTRI INTERV MEALS SNACK
When medically appropriate, PO diet to [facility] Renal c snack/Carbohydrate - modified diet/Diets modified for specific foods and ingredients

## 2017-01-06 NOTE — CONSULT NOTE ADULT - ASSESSMENT
sepsis   Altered Mental Status   end stage renal disease  antibiotics   will follow with you thanks sepsis ; source  '; sx eval   ?/ stat ct   Altered Mental Status   end stage renal disease  antibiotics   will follow with you thanks

## 2017-01-06 NOTE — CONSULT NOTE ADULT - SUBJECTIVE AND OBJECTIVE BOX
HPI:  HPI:  86yo, BF with h/o ESRD 0n dialysis, Sz, DM2,HTN.   BIBA from home accompanied with her daughter and home aide c/o pt not being herself, the aide called the daugter at 8:50am informing her that she was not responding, pt normally is able to verbalize, her daughter got to the home at 9:30am and found her mother to be lethargic and not answering questions, pt is normally verbal, she observed her for the next few hours, she did not improve.  Daughter does not note  any cough, fever, chills, sob, cp.  In ER, patient lethargic with wbc 20. (2017 12:54)      Chief Complaint:  Patient is a 86y old  Female who presents with a chief complaint of     Review of Systems:      CV:  No pain, palpitations, hypo/hypertension  Resp:  No dyspnea, cough, tachypnea, wheezing  GI:  No pain, nausea, vomiting, diarrhea, constipation  :  No pain, bleeding, incontinence, nocturia  Muscle:  No pain, weakness           Social History/Family History  SOCHX:   tobacco,  -  alcohol    FMHX: FA/MO  - contributory       Discussed with:  PMD, Family    Physical Exam:    Vital Signs:  Vital Signs Last 24 Hrs  T(C): 37.1, Max: 37.7 ( @ 02:00)  T(F): 98.8, Max: 99.8 ( @ 02:00)  HR: 79 (79 - 90)  BP: 146/58 (142/58 - 153/57)  BP(mean): --  RR: 17 (16 - 17)  SpO2: 100% (98% - 100%)  Daily     Daily Weight in k.8 (2017 09:23)      Chest:  Full & symmetric excursion, no increased effort, breath sounds clear  Cardiovascular:  Regular rhythm, S1, S2, no murmur/rub/S3/S4, no carotid/femoral/abdominal bruit, radial/pedal pulses 2+, no edema  Abdomen:  Soft, non-tender, non-distended, normoactive bowel sounds, no HSM  Extremities:  Gait & station:   Digits:   Nails:   Joints, Bones, Muscles:   ROM:   Stability:       Laboratory:                          9.9    20.7  )-----------( 212      ( 2017 07:38 )             29.8     2017 07:38    142    |  102    |  39     ----------------------------<  154    3.9     |  29     |  4.48     Ca    10.0       2017 07:38    TPro  8.0    /  Alb  3.2    /  TBili  0.5    /  DBili  x      /  AST  43     /  ALT  17     /  AlkPhos  105    2017 14:15    ABG - ( 2017 14:42 )  pH: x     /  pCO2: 38    /  pO2: 70    / HCO3: 31    / Base Excess: 7.9   /  SaO2: 94                CARDIAC MARKERS ( 2017 16:27 )  .112 ng/mL / x     / 32 U/L / x     / 0.6 ng/mL      CAPILLARY BLOOD GLUCOSE  237 (2017 22:33)  155 (2017 18:21)  131 (2017 11:16)  161 (2017 06:04)  243 (2017 00:40)    LIVER FUNCTIONS - ( 2017 14:15 )  Alb: 3.2 g/dL / Pro: 8.0 gm/dL / ALK PHOS: 105 U/L / ALT: 17 U/L / AST: 43 U/L / GGT: x           PT/INR - ( 2017 14:15 )   PT: 11.6 sec;   INR: 1.04 ratio         PTT - ( 2017 14:15 )  PTT:35.6 sec  Urinalysis Basic - ( 2017 21:42 )    Color: Yellow / Appearance: Clear / S.010 / pH: x  Gluc: x / Ketone: Negative  / Bili: Negative / Urobili: Negative mg/dL   Blood: x / Protein: 100 mg/dL / Nitrite: Negative   Leuk Esterase: Trace / RBC: Negative /HPF / WBC 3-5   Sq Epi: x / Non Sq Epi: Occasional / Bacteria: Few          Assessment:    Equiv Trop  Not likely AMI  ASA  Tele  last echo noted

## 2017-01-06 NOTE — DIETITIAN INITIAL EVALUATION ADULT. - ENERGY NEEDS
Height (cm): 157.5 (01-05)  Weight (kg): 59.8 (01-06)  BMI (kg/m2): 24.1 (01-06)  IBW:  50 kg +/- 10%  % IBW: 120%  UBW: unknown    %UBW: unknown

## 2017-01-07 DIAGNOSIS — K52.9 NONINFECTIVE GASTROENTERITIS AND COLITIS, UNSPECIFIED: ICD-10-CM

## 2017-01-07 LAB
ANION GAP SERPL CALC-SCNC: 12 MMOL/L — SIGNIFICANT CHANGE UP (ref 5–17)
APPEARANCE UR: CLEAR — SIGNIFICANT CHANGE UP
BACTERIA # UR AUTO: ABNORMAL
BILIRUB UR-MCNC: NEGATIVE — SIGNIFICANT CHANGE UP
BUN SERPL-MCNC: 58 MG/DL — HIGH (ref 7–23)
CALCIUM SERPL-MCNC: 8.9 MG/DL — SIGNIFICANT CHANGE UP (ref 8.5–10.1)
CHLORIDE SERPL-SCNC: 100 MMOL/L — SIGNIFICANT CHANGE UP (ref 96–108)
CO2 SERPL-SCNC: 25 MMOL/L — SIGNIFICANT CHANGE UP (ref 22–31)
COLOR SPEC: YELLOW — SIGNIFICANT CHANGE UP
CREAT SERPL-MCNC: 5.83 MG/DL — HIGH (ref 0.5–1.3)
CULTURE RESULTS: NO GROWTH — SIGNIFICANT CHANGE UP
CULTURE RESULTS: NO GROWTH — SIGNIFICANT CHANGE UP
DIFF PNL FLD: ABNORMAL
EPI CELLS # UR: SIGNIFICANT CHANGE UP
GLUCOSE SERPL-MCNC: 196 MG/DL — HIGH (ref 70–99)
GLUCOSE UR QL: 100 MG/DL
HCT VFR BLD CALC: 27.6 % — LOW (ref 34.5–45)
HGB BLD-MCNC: 9.3 G/DL — LOW (ref 11.5–15.5)
KETONES UR-MCNC: NEGATIVE — SIGNIFICANT CHANGE UP
LEUKOCYTE ESTERASE UR-ACNC: ABNORMAL
MCHC RBC-ENTMCNC: 28.9 PG — SIGNIFICANT CHANGE UP (ref 27–34)
MCHC RBC-ENTMCNC: 33.8 GM/DL — SIGNIFICANT CHANGE UP (ref 32–36)
MCV RBC AUTO: 85.3 FL — SIGNIFICANT CHANGE UP (ref 80–100)
NITRITE UR-MCNC: NEGATIVE — SIGNIFICANT CHANGE UP
PH UR: 8 — SIGNIFICANT CHANGE UP (ref 4.8–8)
PLATELET # BLD AUTO: 246 K/UL — SIGNIFICANT CHANGE UP (ref 150–400)
POTASSIUM SERPL-MCNC: 4.2 MMOL/L — SIGNIFICANT CHANGE UP (ref 3.5–5.3)
POTASSIUM SERPL-SCNC: 4.2 MMOL/L — SIGNIFICANT CHANGE UP (ref 3.5–5.3)
PROT UR-MCNC: 100 MG/DL
RBC # BLD: 3.23 M/UL — LOW (ref 3.8–5.2)
RBC # FLD: 14.6 % — SIGNIFICANT CHANGE UP (ref 11–15)
RBC CASTS # UR COMP ASSIST: SIGNIFICANT CHANGE UP /HPF (ref 0–4)
SODIUM SERPL-SCNC: 137 MMOL/L — SIGNIFICANT CHANGE UP (ref 135–145)
SP GR SPEC: 1.01 — SIGNIFICANT CHANGE UP (ref 1.01–1.02)
SPECIMEN SOURCE: SIGNIFICANT CHANGE UP
SPECIMEN SOURCE: SIGNIFICANT CHANGE UP
UROBILINOGEN FLD QL: NEGATIVE MG/DL — SIGNIFICANT CHANGE UP
WBC # BLD: 15.3 K/UL — HIGH (ref 3.8–10.5)
WBC # FLD AUTO: 15.3 K/UL — HIGH (ref 3.8–10.5)
WBC UR QL: SIGNIFICANT CHANGE UP

## 2017-01-07 PROCEDURE — 74177 CT ABD & PELVIS W/CONTRAST: CPT | Mod: 26

## 2017-01-07 PROCEDURE — 71010: CPT | Mod: 26

## 2017-01-07 RX ORDER — METRONIDAZOLE 500 MG
500 TABLET ORAL EVERY 8 HOURS
Qty: 0 | Refills: 0 | Status: DISCONTINUED | OUTPATIENT
Start: 2017-01-07 | End: 2017-01-11

## 2017-01-07 RX ORDER — METRONIDAZOLE 500 MG
TABLET ORAL
Qty: 0 | Refills: 0 | Status: DISCONTINUED | OUTPATIENT
Start: 2017-01-07 | End: 2017-01-11

## 2017-01-07 RX ORDER — VANCOMYCIN HCL 1 G
1000 VIAL (EA) INTRAVENOUS ONCE
Qty: 0 | Refills: 0 | Status: COMPLETED | OUTPATIENT
Start: 2017-01-07 | End: 2017-01-07

## 2017-01-07 RX ORDER — METRONIDAZOLE 500 MG
500 TABLET ORAL ONCE
Qty: 0 | Refills: 0 | Status: COMPLETED | OUTPATIENT
Start: 2017-01-07 | End: 2017-01-07

## 2017-01-07 RX ADMIN — PANTOPRAZOLE SODIUM 40 MILLIGRAM(S): 20 TABLET, DELAYED RELEASE ORAL at 11:09

## 2017-01-07 RX ADMIN — Medication 4: at 16:21

## 2017-01-07 RX ADMIN — Medication 250 MILLIGRAM(S): at 21:39

## 2017-01-07 RX ADMIN — LEVETIRACETAM 750 MILLIGRAM(S): 250 TABLET, FILM COATED ORAL at 06:09

## 2017-01-07 RX ADMIN — PIPERACILLIN AND TAZOBACTAM 25 GRAM(S): 4; .5 INJECTION, POWDER, LYOPHILIZED, FOR SOLUTION INTRAVENOUS at 23:52

## 2017-01-07 RX ADMIN — PIPERACILLIN AND TAZOBACTAM 25 GRAM(S): 4; .5 INJECTION, POWDER, LYOPHILIZED, FOR SOLUTION INTRAVENOUS at 11:08

## 2017-01-07 RX ADMIN — LEVETIRACETAM 750 MILLIGRAM(S): 250 TABLET, FILM COATED ORAL at 23:10

## 2017-01-07 RX ADMIN — HEPARIN SODIUM 5000 UNIT(S): 5000 INJECTION INTRAVENOUS; SUBCUTANEOUS at 06:13

## 2017-01-07 RX ADMIN — Medication 650 MILLIGRAM(S): at 00:04

## 2017-01-07 RX ADMIN — Medication 2: at 11:13

## 2017-01-07 RX ADMIN — CLOPIDOGREL BISULFATE 75 MILLIGRAM(S): 75 TABLET, FILM COATED ORAL at 11:09

## 2017-01-07 RX ADMIN — HEPARIN SODIUM 5000 UNIT(S): 5000 INJECTION INTRAVENOUS; SUBCUTANEOUS at 23:09

## 2017-01-07 NOTE — PHYSICAL THERAPY INITIAL EVALUATION ADULT - CRITERIA FOR SKILLED THERAPEUTIC INTERVENTIONS
therapy frequency/anticipated discharge recommendation/rehab potential/anticipated equipment needs at discharge/short term rehab/predicted duration of therapy intervention/functional limitations in following categories/impairments found/risk reduction/prevention

## 2017-01-07 NOTE — PHYSICAL THERAPY INITIAL EVALUATION ADULT - GAIT DEVIATIONS NOTED, PT EVAL
decreased stride length/decreased step length/increased time in double stance/decreased velocity of limb motion/decreased jessica

## 2017-01-07 NOTE — PHYSICAL THERAPY INITIAL EVALUATION ADULT - ADDITIONAL COMMENTS
Pts daughter at bedside reports pt has [facility] 11 hours a day M-F and Sat-Sun 8hrs a day in order to help with dressing, cleaning, showering, cooking and all ADL's and IADL's. Pts daughter reports pt is capable of being able to ambulate and transfer independently c rolling walker when she wants to.

## 2017-01-07 NOTE — PHYSICAL THERAPY INITIAL EVALUATION ADULT - PLANNED THERAPY INTERVENTIONS, PT EVAL
postural re-education/transfer training/balance training/neuromuscular re-education/bed mobility training/gait training/strengthening

## 2017-01-07 NOTE — PHYSICAL THERAPY INITIAL EVALUATION ADULT - GENERAL OBSERVATIONS, REHAB EVAL
Pt seen supine, alert and confused, dtr at bedside, IV intact, cardiac monitor intact, nasal cannula donned at 2L.

## 2017-01-07 NOTE — PHYSICAL THERAPY INITIAL EVALUATION ADULT - MODIFIED CLINICAL TEST OF SENSORY INTEGRATION IN BALANCE TEST
Barthel Index: Feeding Score __5_, Bathing Score _0__, Grooming Score _0__, Dressing Score _5__, Bowels Score __10_, Bladder Score _10__, Toilet Score _5__, Transfers Score _10__, Mobility Score _0__, Stairs Score __0_,     Total Score _45__

## 2017-01-07 NOTE — PHYSICAL THERAPY INITIAL EVALUATION ADULT - IMPAIRMENTS FOUND, PT EVAL
gait, locomotion, and balance/arousal, attention, and cognition/cognitive impairment/posture/muscle strength/aerobic capacity/endurance

## 2017-01-08 DIAGNOSIS — A41.9 SEPSIS, UNSPECIFIED ORGANISM: ICD-10-CM

## 2017-01-08 LAB
ANION GAP SERPL CALC-SCNC: 8 MMOL/L — SIGNIFICANT CHANGE UP (ref 5–17)
BUN SERPL-MCNC: 21 MG/DL — SIGNIFICANT CHANGE UP (ref 7–23)
CALCIUM SERPL-MCNC: 8.5 MG/DL — SIGNIFICANT CHANGE UP (ref 8.5–10.1)
CHLORIDE SERPL-SCNC: 103 MMOL/L — SIGNIFICANT CHANGE UP (ref 96–108)
CO2 SERPL-SCNC: 30 MMOL/L — SIGNIFICANT CHANGE UP (ref 22–31)
CREAT SERPL-MCNC: 3.37 MG/DL — HIGH (ref 0.5–1.3)
CULTURE RESULTS: NO GROWTH — SIGNIFICANT CHANGE UP
GLUCOSE SERPL-MCNC: 139 MG/DL — HIGH (ref 70–99)
HBA1C BLD-MCNC: 9 % — HIGH (ref 4–5.6)
HCT VFR BLD CALC: 29.6 % — LOW (ref 34.5–45)
HGB BLD-MCNC: 9.8 G/DL — LOW (ref 11.5–15.5)
MCHC RBC-ENTMCNC: 28.9 PG — SIGNIFICANT CHANGE UP (ref 27–34)
MCHC RBC-ENTMCNC: 33 GM/DL — SIGNIFICANT CHANGE UP (ref 32–36)
MCV RBC AUTO: 87.5 FL — SIGNIFICANT CHANGE UP (ref 80–100)
PLATELET # BLD AUTO: 219 K/UL — SIGNIFICANT CHANGE UP (ref 150–400)
POTASSIUM SERPL-MCNC: 3.5 MMOL/L — SIGNIFICANT CHANGE UP (ref 3.5–5.3)
POTASSIUM SERPL-SCNC: 3.5 MMOL/L — SIGNIFICANT CHANGE UP (ref 3.5–5.3)
RBC # BLD: 3.38 M/UL — LOW (ref 3.8–5.2)
RBC # FLD: 15.1 % — HIGH (ref 11–15)
SODIUM SERPL-SCNC: 141 MMOL/L — SIGNIFICANT CHANGE UP (ref 135–145)
SPECIMEN SOURCE: SIGNIFICANT CHANGE UP
WBC # BLD: 10.5 K/UL — SIGNIFICANT CHANGE UP (ref 3.8–10.5)
WBC # FLD AUTO: 10.5 K/UL — SIGNIFICANT CHANGE UP (ref 3.8–10.5)

## 2017-01-08 RX ORDER — PANTOPRAZOLE SODIUM 20 MG/1
40 TABLET, DELAYED RELEASE ORAL
Qty: 0 | Refills: 0 | Status: DISCONTINUED | OUTPATIENT
Start: 2017-01-09 | End: 2017-01-11

## 2017-01-08 RX ADMIN — Medication 100 MILLIGRAM(S): at 12:55

## 2017-01-08 RX ADMIN — Medication 650 MILLIGRAM(S): at 00:27

## 2017-01-08 RX ADMIN — PANTOPRAZOLE SODIUM 40 MILLIGRAM(S): 20 TABLET, DELAYED RELEASE ORAL at 13:02

## 2017-01-08 RX ADMIN — LEVETIRACETAM 750 MILLIGRAM(S): 250 TABLET, FILM COATED ORAL at 18:07

## 2017-01-08 RX ADMIN — PIPERACILLIN AND TAZOBACTAM 25 GRAM(S): 4; .5 INJECTION, POWDER, LYOPHILIZED, FOR SOLUTION INTRAVENOUS at 09:33

## 2017-01-08 RX ADMIN — PIPERACILLIN AND TAZOBACTAM 25 GRAM(S): 4; .5 INJECTION, POWDER, LYOPHILIZED, FOR SOLUTION INTRAVENOUS at 22:03

## 2017-01-08 RX ADMIN — HEPARIN SODIUM 5000 UNIT(S): 5000 INJECTION INTRAVENOUS; SUBCUTANEOUS at 18:10

## 2017-01-08 RX ADMIN — HEPARIN SODIUM 5000 UNIT(S): 5000 INJECTION INTRAVENOUS; SUBCUTANEOUS at 05:30

## 2017-01-08 RX ADMIN — Medication 100 MILLIGRAM(S): at 03:23

## 2017-01-08 RX ADMIN — LEVETIRACETAM 750 MILLIGRAM(S): 250 TABLET, FILM COATED ORAL at 05:30

## 2017-01-08 RX ADMIN — Medication 100 MILLIGRAM(S): at 22:03

## 2017-01-08 RX ADMIN — CLOPIDOGREL BISULFATE 75 MILLIGRAM(S): 75 TABLET, FILM COATED ORAL at 13:08

## 2017-01-08 RX ADMIN — Medication 4: at 18:09

## 2017-01-09 DIAGNOSIS — I10 ESSENTIAL (PRIMARY) HYPERTENSION: ICD-10-CM

## 2017-01-09 LAB
ANION GAP SERPL CALC-SCNC: 10 MMOL/L — SIGNIFICANT CHANGE UP (ref 5–17)
BUN SERPL-MCNC: 35 MG/DL — HIGH (ref 7–23)
CALCIUM SERPL-MCNC: 8.7 MG/DL — SIGNIFICANT CHANGE UP (ref 8.5–10.1)
CHLORIDE SERPL-SCNC: 102 MMOL/L — SIGNIFICANT CHANGE UP (ref 96–108)
CO2 SERPL-SCNC: 27 MMOL/L — SIGNIFICANT CHANGE UP (ref 22–31)
CREAT SERPL-MCNC: 4.71 MG/DL — HIGH (ref 0.5–1.3)
GLUCOSE SERPL-MCNC: 193 MG/DL — HIGH (ref 70–99)
HCT VFR BLD CALC: 29.7 % — LOW (ref 34.5–45)
HGB BLD-MCNC: 9.8 G/DL — LOW (ref 11.5–15.5)
MCHC RBC-ENTMCNC: 28.2 PG — SIGNIFICANT CHANGE UP (ref 27–34)
MCHC RBC-ENTMCNC: 33 GM/DL — SIGNIFICANT CHANGE UP (ref 32–36)
MCV RBC AUTO: 85.6 FL — SIGNIFICANT CHANGE UP (ref 80–100)
PLATELET # BLD AUTO: 252 K/UL — SIGNIFICANT CHANGE UP (ref 150–400)
POTASSIUM SERPL-MCNC: 3.4 MMOL/L — LOW (ref 3.5–5.3)
POTASSIUM SERPL-SCNC: 3.4 MMOL/L — LOW (ref 3.5–5.3)
RBC # BLD: 3.46 M/UL — LOW (ref 3.8–5.2)
RBC # FLD: 14.6 % — SIGNIFICANT CHANGE UP (ref 11–15)
SODIUM SERPL-SCNC: 139 MMOL/L — SIGNIFICANT CHANGE UP (ref 135–145)
WBC # BLD: 8.2 K/UL — SIGNIFICANT CHANGE UP (ref 3.8–10.5)
WBC # FLD AUTO: 8.2 K/UL — SIGNIFICANT CHANGE UP (ref 3.8–10.5)

## 2017-01-09 RX ORDER — POTASSIUM CHLORIDE 20 MEQ
20 PACKET (EA) ORAL ONCE
Qty: 0 | Refills: 0 | Status: COMPLETED | OUTPATIENT
Start: 2017-01-09 | End: 2017-01-09

## 2017-01-09 RX ORDER — ERYTHROPOIETIN 10000 [IU]/ML
10000 INJECTION, SOLUTION INTRAVENOUS; SUBCUTANEOUS ONCE
Qty: 0 | Refills: 0 | Status: COMPLETED | OUTPATIENT
Start: 2017-01-09 | End: 2017-01-09

## 2017-01-09 RX ORDER — AMLODIPINE BESYLATE 2.5 MG/1
10 TABLET ORAL DAILY
Qty: 0 | Refills: 0 | Status: DISCONTINUED | OUTPATIENT
Start: 2017-01-09 | End: 2017-01-11

## 2017-01-09 RX ADMIN — LEVETIRACETAM 750 MILLIGRAM(S): 250 TABLET, FILM COATED ORAL at 06:24

## 2017-01-09 RX ADMIN — Medication 100 MILLIGRAM(S): at 06:24

## 2017-01-09 RX ADMIN — Medication 100 MILLIGRAM(S): at 21:24

## 2017-01-09 RX ADMIN — PIPERACILLIN AND TAZOBACTAM 25 GRAM(S): 4; .5 INJECTION, POWDER, LYOPHILIZED, FOR SOLUTION INTRAVENOUS at 21:24

## 2017-01-09 RX ADMIN — Medication 4: at 08:03

## 2017-01-09 RX ADMIN — ERYTHROPOIETIN 10000 UNIT(S): 10000 INJECTION, SOLUTION INTRAVENOUS; SUBCUTANEOUS at 12:38

## 2017-01-09 RX ADMIN — Medication 20 MILLIEQUIVALENT(S): at 18:48

## 2017-01-09 RX ADMIN — HEPARIN SODIUM 5000 UNIT(S): 5000 INJECTION INTRAVENOUS; SUBCUTANEOUS at 18:53

## 2017-01-09 RX ADMIN — Medication 2: at 18:52

## 2017-01-09 RX ADMIN — PANTOPRAZOLE SODIUM 40 MILLIGRAM(S): 20 TABLET, DELAYED RELEASE ORAL at 06:24

## 2017-01-09 RX ADMIN — CLOPIDOGREL BISULFATE 75 MILLIGRAM(S): 75 TABLET, FILM COATED ORAL at 18:48

## 2017-01-09 RX ADMIN — LEVETIRACETAM 750 MILLIGRAM(S): 250 TABLET, FILM COATED ORAL at 18:49

## 2017-01-09 RX ADMIN — HEPARIN SODIUM 5000 UNIT(S): 5000 INJECTION INTRAVENOUS; SUBCUTANEOUS at 06:23

## 2017-01-10 LAB
CRP SERPL-MCNC: 8.64 MG/DL — HIGH (ref 0–0.4)
CULTURE RESULTS: SIGNIFICANT CHANGE UP
CULTURE RESULTS: SIGNIFICANT CHANGE UP
SPECIMEN SOURCE: SIGNIFICANT CHANGE UP
SPECIMEN SOURCE: SIGNIFICANT CHANGE UP

## 2017-01-10 RX ADMIN — HEPARIN SODIUM 5000 UNIT(S): 5000 INJECTION INTRAVENOUS; SUBCUTANEOUS at 17:44

## 2017-01-10 RX ADMIN — Medication 6: at 17:44

## 2017-01-10 RX ADMIN — HEPARIN SODIUM 5000 UNIT(S): 5000 INJECTION INTRAVENOUS; SUBCUTANEOUS at 05:32

## 2017-01-10 RX ADMIN — PANTOPRAZOLE SODIUM 40 MILLIGRAM(S): 20 TABLET, DELAYED RELEASE ORAL at 06:06

## 2017-01-10 RX ADMIN — Medication 100 MILLIGRAM(S): at 05:32

## 2017-01-10 RX ADMIN — PIPERACILLIN AND TAZOBACTAM 25 GRAM(S): 4; .5 INJECTION, POWDER, LYOPHILIZED, FOR SOLUTION INTRAVENOUS at 10:10

## 2017-01-10 RX ADMIN — Medication 100 MILLIGRAM(S): at 14:54

## 2017-01-10 RX ADMIN — LEVETIRACETAM 750 MILLIGRAM(S): 250 TABLET, FILM COATED ORAL at 17:44

## 2017-01-10 RX ADMIN — Medication 6: at 12:14

## 2017-01-10 RX ADMIN — LEVETIRACETAM 750 MILLIGRAM(S): 250 TABLET, FILM COATED ORAL at 05:32

## 2017-01-10 RX ADMIN — AMLODIPINE BESYLATE 10 MILLIGRAM(S): 2.5 TABLET ORAL at 05:32

## 2017-01-10 RX ADMIN — PIPERACILLIN AND TAZOBACTAM 25 GRAM(S): 4; .5 INJECTION, POWDER, LYOPHILIZED, FOR SOLUTION INTRAVENOUS at 22:18

## 2017-01-10 RX ADMIN — CLOPIDOGREL BISULFATE 75 MILLIGRAM(S): 75 TABLET, FILM COATED ORAL at 12:15

## 2017-01-10 RX ADMIN — Medication 100 MILLIGRAM(S): at 22:18

## 2017-01-10 NOTE — PROGRESS NOTE ADULT - PROBLEM SELECTOR PLAN 2
No reported diarrhea  GI evaluation for EGD/colonoscopy r/o underlying malignancy   On Flagyl with Zosyn No reported diarrhea  Initial CT A/P showed layering gall stone, ? RUQ sonogram   CT A/P with IV contrast showed ileocolitis   GI evaluation for EGD/colonoscopy r/o underlying malignancy   On Flagyl with Zosyn

## 2017-01-10 NOTE — PROGRESS NOTE ADULT - ATTENDING COMMENTS
Continue current care and treatment.

## 2017-01-10 NOTE — PROGRESS NOTE ADULT - PROBLEM SELECTOR PLAN 3
Surgery following must r/o Cholecystitis for HIDA scan  Flagyl added. Surgery following  Cholecystitis ruled out.  Flagyl added.

## 2017-01-11 VITALS
HEART RATE: 92 BPM | TEMPERATURE: 98 F | DIASTOLIC BLOOD PRESSURE: 41 MMHG | SYSTOLIC BLOOD PRESSURE: 136 MMHG | OXYGEN SATURATION: 97 % | RESPIRATION RATE: 16 BRPM

## 2017-01-11 DIAGNOSIS — R21 RASH AND OTHER NONSPECIFIC SKIN ERUPTION: ICD-10-CM

## 2017-01-11 LAB
ALBUMIN SERPL ELPH-MCNC: 2.4 G/DL — LOW (ref 3.3–5)
ALP SERPL-CCNC: 98 U/L — SIGNIFICANT CHANGE UP (ref 40–120)
ALT FLD-CCNC: 23 U/L — SIGNIFICANT CHANGE UP (ref 12–78)
ANION GAP SERPL CALC-SCNC: 11 MMOL/L — SIGNIFICANT CHANGE UP (ref 5–17)
AST SERPL-CCNC: 24 U/L — SIGNIFICANT CHANGE UP (ref 15–37)
BILIRUB SERPL-MCNC: 0.4 MG/DL — SIGNIFICANT CHANGE UP (ref 0.2–1.2)
BUN SERPL-MCNC: 31 MG/DL — HIGH (ref 7–23)
CALCIUM SERPL-MCNC: 9.3 MG/DL — SIGNIFICANT CHANGE UP (ref 8.5–10.1)
CHLORIDE SERPL-SCNC: 104 MMOL/L — SIGNIFICANT CHANGE UP (ref 96–108)
CO2 SERPL-SCNC: 26 MMOL/L — SIGNIFICANT CHANGE UP (ref 22–31)
CREAT SERPL-MCNC: 4.77 MG/DL — HIGH (ref 0.5–1.3)
CRP SERPL-MCNC: 4.89 MG/DL — HIGH (ref 0–0.4)
CULTURE RESULTS: SIGNIFICANT CHANGE UP
GLUCOSE SERPL-MCNC: 222 MG/DL — HIGH (ref 70–99)
HCT VFR BLD CALC: 31.1 % — LOW (ref 34.5–45)
HGB BLD-MCNC: 10 G/DL — LOW (ref 11.5–15.5)
MCHC RBC-ENTMCNC: 27.6 PG — SIGNIFICANT CHANGE UP (ref 27–34)
MCHC RBC-ENTMCNC: 32.2 GM/DL — SIGNIFICANT CHANGE UP (ref 32–36)
MCV RBC AUTO: 85.9 FL — SIGNIFICANT CHANGE UP (ref 80–100)
PLATELET # BLD AUTO: 312 K/UL — SIGNIFICANT CHANGE UP (ref 150–400)
POTASSIUM SERPL-MCNC: 3.1 MMOL/L — LOW (ref 3.5–5.3)
POTASSIUM SERPL-SCNC: 3.1 MMOL/L — LOW (ref 3.5–5.3)
PROT SERPL-MCNC: 7 GM/DL — SIGNIFICANT CHANGE UP (ref 6–8.3)
RBC # BLD: 3.62 M/UL — LOW (ref 3.8–5.2)
RBC # FLD: 15 % — SIGNIFICANT CHANGE UP (ref 11–15)
SODIUM SERPL-SCNC: 141 MMOL/L — SIGNIFICANT CHANGE UP (ref 135–145)
SPECIMEN SOURCE: SIGNIFICANT CHANGE UP
WBC # BLD: 9 K/UL — SIGNIFICANT CHANGE UP (ref 3.8–10.5)
WBC # FLD AUTO: 9 K/UL — SIGNIFICANT CHANGE UP (ref 3.8–10.5)

## 2017-01-11 RX ORDER — POTASSIUM CHLORIDE 20 MEQ
20 PACKET (EA) ORAL ONCE
Qty: 0 | Refills: 0 | Status: DISCONTINUED | OUTPATIENT
Start: 2017-01-11 | End: 2017-01-11

## 2017-01-11 RX ORDER — PANTOPRAZOLE SODIUM 20 MG/1
1 TABLET, DELAYED RELEASE ORAL
Qty: 30 | Refills: 1 | OUTPATIENT
Start: 2017-01-11 | End: 2017-03-11

## 2017-01-11 RX ORDER — MOXIFLOXACIN HYDROCHLORIDE TABLETS, 400 MG 400 MG/1
1 TABLET, FILM COATED ORAL
Qty: 20 | Refills: 0 | OUTPATIENT
Start: 2017-01-11 | End: 2017-01-21

## 2017-01-11 RX ORDER — AMLODIPINE BESYLATE 2.5 MG/1
1 TABLET ORAL
Qty: 30 | Refills: 3 | OUTPATIENT
Start: 2017-01-11 | End: 2017-05-10

## 2017-01-11 RX ORDER — INSULIN HUMAN 100 [IU]/ML
10 INJECTION, SOLUTION SUBCUTANEOUS ONCE
Qty: 0 | Refills: 0 | Status: COMPLETED | OUTPATIENT
Start: 2017-01-11 | End: 2017-01-11

## 2017-01-11 RX ORDER — POTASSIUM CHLORIDE 20 MEQ
40 PACKET (EA) ORAL ONCE
Qty: 0 | Refills: 0 | Status: COMPLETED | OUTPATIENT
Start: 2017-01-11 | End: 2017-01-11

## 2017-01-11 RX ADMIN — Medication 4: at 17:17

## 2017-01-11 RX ADMIN — PANTOPRAZOLE SODIUM 40 MILLIGRAM(S): 20 TABLET, DELAYED RELEASE ORAL at 07:30

## 2017-01-11 RX ADMIN — PIPERACILLIN AND TAZOBACTAM 25 GRAM(S): 4; .5 INJECTION, POWDER, LYOPHILIZED, FOR SOLUTION INTRAVENOUS at 14:59

## 2017-01-11 RX ADMIN — INSULIN HUMAN 10 UNIT(S): 100 INJECTION, SOLUTION SUBCUTANEOUS at 02:11

## 2017-01-11 RX ADMIN — LEVETIRACETAM 750 MILLIGRAM(S): 250 TABLET, FILM COATED ORAL at 17:17

## 2017-01-11 RX ADMIN — HEPARIN SODIUM 5000 UNIT(S): 5000 INJECTION INTRAVENOUS; SUBCUTANEOUS at 05:53

## 2017-01-11 RX ADMIN — Medication 100 MILLIGRAM(S): at 14:59

## 2017-01-11 RX ADMIN — AMLODIPINE BESYLATE 10 MILLIGRAM(S): 2.5 TABLET ORAL at 05:53

## 2017-01-11 RX ADMIN — CLOPIDOGREL BISULFATE 75 MILLIGRAM(S): 75 TABLET, FILM COATED ORAL at 14:42

## 2017-01-11 RX ADMIN — Medication 40 MILLIEQUIVALENT(S): at 09:36

## 2017-01-11 RX ADMIN — HEPARIN SODIUM 5000 UNIT(S): 5000 INJECTION INTRAVENOUS; SUBCUTANEOUS at 17:17

## 2017-01-11 RX ADMIN — Medication 100 MILLIGRAM(S): at 05:55

## 2017-01-11 RX ADMIN — Medication 4: at 07:39

## 2017-01-11 RX ADMIN — Medication 1 APPLICATION(S): at 17:18

## 2017-01-11 RX ADMIN — LEVETIRACETAM 750 MILLIGRAM(S): 250 TABLET, FILM COATED ORAL at 05:53

## 2017-01-11 NOTE — PROGRESS NOTE ADULT - SUBJECTIVE AND OBJECTIVE BOX
Assessment:    Equiv Trop  Not likely AMI  ASA  Tele  last echo noted
HPI:  86yo, BF with h/o ESRD 0n dialysis, Sz, DM2,HTN.   BIBA from home accompanied with her daughter and home aide c/o pt not being herself, the aide called the daugter at 8:50am informing her that she was not responding, pt normally is able to verbalize, her daughter got to the home at 9:30am and found her mother to be lethargic and not answering questions, pt is normally verbal, she observed her for the next few hours, she did not improve.  Daughter does not note  any cough, fever, chills, sob, cp.  In ER, patient lethargic with wbc 20.   i saw on    stat ct abdominal ;ileo colitis   all resolved       Allergies    No Known Allergies    Intolerances        MEDICATIONS  (STANDING):  heparin  Injectable 5000Unit(s) SubCutaneous every 12 hours  insulin lispro (HumaLOG) corrective regimen sliding scale  SubCutaneous three times a day before meals  dextrose 5%. 1000milliLiter(s) IV Continuous <Continuous>  dextrose 50% Injectable 12.5Gram(s) IV Push once  dextrose 50% Injectable 25Gram(s) IV Push once  piperacillin/tazobactam IVPB. 3.375Gram(s) IV Intermittent every 12 hours  pantoprazole  Injectable 40milliGRAM(s) IV Push daily  sodium chloride 0.45%. 1000milliLiter(s) IV Continuous <Continuous>  clopidogrel Tablet 75milliGRAM(s) Oral daily  levETIRAcetam 750milliGRAM(s) Oral two times a day  metroNIDAZOLE  IVPB 500milliGRAM(s) IV Intermittent every 8 hours  metroNIDAZOLE  IVPB  IV Intermittent     MEDICATIONS  (PRN):  acetaminophen  Suppository 650milliGRAM(s) Rectal every 6 hours PRN For Temp greater than 38 C (100.4 F)  dextrose Gel 1Dose(s) Oral once PRN Blood Glucose LESS THAN 70 milliGRAM(s)/deciliter  glucagon  Injectable 1milliGRAM(s) IntraMuscular once PRN Glucose LESS THAN 70 milligrams/deciliter      REVIEW OF SYSTEMS:  feels better  CONSTITUTIONAL: No fever, chills, weight loss, or fatigue  HEENT: No sore throat, runny nose, ear ache  RESPIRATORY: No cough, wheezing, No shortness of breath  CARDIOVASCULAR: No chest pain, palpitations, dizziness  GASTROINTESTINAL: still  abdominal pain. No nausea, vomiting, diarrhea  GENITOURINARY: No dysuria, increase frequency, hematuria, or incontinence  NEUROLOGICAL: No headaches, memory loss, loss of strength, numbness, or tremors, no weakness  EXTREMITY: No pedal edema BLE  SKIN: No itching, burning, rashes, or lesions     VITAL SIGNS:  T(C): 37.1, Max: 38.7 ( @ 00:34)  T(F): 98.8, Max: 101.6 ( @ 00:34)  HR: 81 (63 - 103)  BP: 158/56 (138/63 - 180/56)  RR: 18 (16 - 18)  SpO2: 96% (96% - 100%)  Wt(kg): --    PHYSICAL EXAM:    GENERAL: not in any distress  HEENT: Neck is supple, normocephalic, atraumatic   CHEST/LUNG: Clear to percussion bilaterally; No rales, rhonchi, wheezing  HEART: Regular rate and rhythm; No murmurs, rubs, or gallops  ABDOMEN: Soft,  sl tender, Nondistended; Bowel sounds present, no rebound   EXTREMITIES:  2+ Peripheral Pulses, No clubbing, cyanosis, or edema  SKIN: No rashes or lesions  BACK: no pressor sore   NERVOUS SYSTEM:  Alert & Oriented X3, Good concentration  PSYCH: normal affect     LABS:                         9.8    10.5  )-----------( 219      ( 2017 06:48 )             29.6     2017 06:48    141    |  103    |  21     ----------------------------<  139    3.5     |  30     |  3.37     Ca    8.5        2017 06:48          Urinalysis Basic - ( 2017 01:24 )    Color: Yellow / Appearance: Clear / S.010 / pH: x  Gluc: x / Ketone: Negative  / Bili: Negative / Urobili: Negative mg/dL   Blood: x / Protein: 100 mg/dL / Nitrite: Negative   Leuk Esterase: Trace / RBC: 0-2 /HPF / WBC 0-2   Sq Epi: x / Non Sq Epi: Occasional / Bacteria: Few                          Culture Results:   No growth ( @ 10:18)  Culture Results:   No growth to date. ( @ 09:14)  Culture Results:   No growth ( @ 09:01)  Culture Results:   No growth ( @ 09:01)  Culture Results:   No growth to date. ( @ 17:40)  Culture Results:   No growth to date. ( @ 17:40)                Radiology:  Impression:    Segmental thickening cecum and terminal ileum. Favor   inflammatory/infectious ileocolitis. Neoplasm not excluded. Consider   endoscopic correlation.  Cystic lesions in the pancreatic head and tail as described. Correlate   with MR if there are no contraindications.  Additional findings as discussed
INTERVAL HPI/OVERNIGHT EVENTS:  Itching and rash in left AC area     ANTIBIOTICS/RELEVANT:    MEDICATIONS  (STANDING):  heparin  Injectable 5000Unit(s) SubCutaneous every 12 hours  insulin lispro (HumaLOG) corrective regimen sliding scale  SubCutaneous three times a day before meals  dextrose 5%. 1000milliLiter(s) IV Continuous <Continuous>  dextrose 50% Injectable 12.5Gram(s) IV Push once  dextrose 50% Injectable 25Gram(s) IV Push once  piperacillin/tazobactam IVPB. 3.375Gram(s) IV Intermittent every 12 hours  sodium chloride 0.45%. 1000milliLiter(s) IV Continuous <Continuous>  clopidogrel Tablet 75milliGRAM(s) Oral daily  levETIRAcetam 750milliGRAM(s) Oral two times a day  metroNIDAZOLE  IVPB 500milliGRAM(s) IV Intermittent every 8 hours  metroNIDAZOLE  IVPB  IV Intermittent   pantoprazole    Tablet 40milliGRAM(s) Oral before breakfast  amLODIPine   Tablet 10milliGRAM(s) Oral daily    MEDICATIONS  (PRN):  acetaminophen  Suppository 650milliGRAM(s) Rectal every 6 hours PRN For Temp greater than 38 C (100.4 F)  dextrose Gel 1Dose(s) Oral once PRN Blood Glucose LESS THAN 70 milliGRAM(s)/deciliter  glucagon  Injectable 1milliGRAM(s) IntraMuscular once PRN Glucose LESS THAN 70 milligrams/deciliter      Allergies    No Known Allergies    Intolerances        Vital Signs Last 24 Hrs  T(C): 36.8, Max: 37.4 (01-10 @ 16:15)  T(F): 98.2, Max: 99.4 (01-10 @ 16:15)  HR: 78 (75 - 78)  BP: 168/68 (136/59 - 168/68)  BP(mean): --  RR: 18 (18 - 19)  SpO2: 96% (95% - 98%)    PHYSICAL EXAM:    General: not in any distress    HEENT: no pallor, moist oral cavity    Respiratory: clear to auscultation bilaterally    Cardiovascular: S1, S2, RRR    Gastrointestinal: +BS, soft, NT, NT    Extremities: GAVINO AVG, +rash in left AC fossa     LABS:                        10.0   9.0   )-----------( 312      ( 11 Jan 2017 07:30 )             31.1     11 Jan 2017 07:30    141    |  104    |  31     ----------------------------<  222    3.1     |  26     |  4.77     Ca    9.3        11 Jan 2017 07:30    TPro  7.0    /  Alb  2.4    /  TBili  0.4    /  DBili  x      /  AST  24     /  ALT  23     /  AlkPhos  98     11 Jan 2017 07:30      MICROBIOLOGY:  Culture - Blood (01.08.17 @ 00:38)    Specimen Source: .Blood Blood    Culture Results:   No growth to date.    RADIOLOGY & ADDITIONAL STUDIES:  CT A/P  Segmental thickening cecum and terminal ileum. Favor   inflammatory/infectious ileocolitis. Neoplasm not excluded. Consider   endoscopic correlation.  Cystic lesions in the pancreatic head and tail as described.
INTERVAL HPI/OVERNIGHT EVENTS:  No diarrhea s per RN     ANTIBIOTICS/RELEVANT:    MEDICATIONS  (STANDING):  heparin  Injectable 5000Unit(s) SubCutaneous every 12 hours  insulin lispro (HumaLOG) corrective regimen sliding scale  SubCutaneous three times a day before meals  dextrose 5%. 1000milliLiter(s) IV Continuous <Continuous>  dextrose 50% Injectable 12.5Gram(s) IV Push once  dextrose 50% Injectable 25Gram(s) IV Push once  piperacillin/tazobactam IVPB. 3.375Gram(s) IV Intermittent every 12 hours  sodium chloride 0.45%. 1000milliLiter(s) IV Continuous <Continuous>  clopidogrel Tablet 75milliGRAM(s) Oral daily  levETIRAcetam 750milliGRAM(s) Oral two times a day  metroNIDAZOLE  IVPB 500milliGRAM(s) IV Intermittent every 8 hours  metroNIDAZOLE  IVPB  IV Intermittent   pantoprazole    Tablet 40milliGRAM(s) Oral before breakfast  amLODIPine   Tablet 10milliGRAM(s) Oral daily    MEDICATIONS  (PRN):  acetaminophen  Suppository 650milliGRAM(s) Rectal every 6 hours PRN For Temp greater than 38 C (100.4 F)  dextrose Gel 1Dose(s) Oral once PRN Blood Glucose LESS THAN 70 milliGRAM(s)/deciliter  glucagon  Injectable 1milliGRAM(s) IntraMuscular once PRN Glucose LESS THAN 70 milligrams/deciliter      Allergies    No Known Allergies    Intolerances        Vital Signs Last 24 Hrs  T(C): 36.9, Max: 37.4 (01-10 @ 00:30)  T(F): 98.4, Max: 99.4 (01-10 @ 00:30)  HR: 74 (74 - 90)  BP: 172/63 (152/59 - 180/74)  BP(mean): --  RR: 18 (17 - 18)  SpO2: 96% (96% - 100%)    PHYSICAL EXAM:    General: not in any distress    HEENT: no pallor, moist oral cavity    Respiratory: clear to auscultation bilaterally    Cardiovascular: S1, S2, RRR    Gastrointestinal: +increased BS, soft, ? distended with tenderness in mid abdomen     Extremities: No pedal edema BLE     LABS:                        9.8    8.2   )-----------( 252      ( 09 Jan 2017 07:17 )             29.7     09 Jan 2017 07:17    139    |  102    |  35     ----------------------------<  193    3.4     |  27     |  4.71     Ca    8.7        09 Jan 2017 07:17      MICROBIOLOGY:  Culture - Blood (01.08.17 @ 00:38)    Specimen Source: .Blood Blood    Culture Results:   No growth to date.  Culture - Urine (01.06.17 @ 09:01)    Specimen Source: .Urine Catheterized    Culture Results:   No growth    RADIOLOGY & ADDITIONAL STUDIES:  CT A/P   Segmental thickening cecum and terminal ileum. Favor   inflammatory/infectious ileocolitis. Neoplasm not excluded. Consider   endoscopic correlation.  Cystic lesions in the pancreatic head and tail as described. Correlate   with MR if there are no contraindications.
Patient seen and examined at bedside in NAD. Pt is without complaints. +flatus/no BM. Pt denies abdominal pain, n/v, fever, chills, cp, sob. Tolerating diet.     Vital Signs Last 24 Hrs  T(F): 98.4, Max: 98.8 (01-08 @ 11:11)  HR: 73  BP: 158/61  RR: 18  SpO2: 96%    GENERAL: Alert, oriented, NAD  CHEST/LUNG: Respirations nonlabored  HEART: S1S2, Regular rate and rhythm;   ABDOMEN: + Bowel sounds, soft, Nontender, Nondistended  EXTREMITIES:  no calf tenderness, No edema b.l    LABS:                        9.8    8.2   )-----------( 252      ( 09 Jan 2017 07:17 )             29.7     09 Jan 2017 07:17    139    |  102    |  35     ----------------------------<  193    3.4     |  27     |  4.71     Ca    8.7        09 Jan 2017 07:17      Impression: 86 year old female with ESRD on HD a/w sepsis 2/2 acute enterocolitis, cholelithiasis    Plan:  - no surgical intervention planned  - continue with antibiotics per ID  - continue regular ([facility]) diet as tolerated  - continue DVT prophylaxis  - continue care per medical team  - will discuss with Dr. Leong
Patient seen and examined at bedside in no distress. Patient is without complaints; denies abdominal pain, n/v. Admits to flatus.     Vital Signs Last 24 Hrs  T(F): 98.8, Max: 101.6 (01-08 @ 00:34)  HR: 81  BP: 158/56  RR: 18  SpO2: 96%  Wt(kg): --   CAPILLARY BLOOD GLUCOSE  143 (08 Jan 2017 12:54)      GENERAL: Alert, NAD  CHEST/LUNG: Clear to auscultation bilaterally, respirations nonlabored  HEART: S1S2, Regular rate and rhythm  ABDOMEN: + Bowel sounds, soft, nondistended, nontender  EXTREMITIES:  no calf tenderness, no pedal edema b/l       LABS:                        9.8    10.5  )-----------( 219      ( 08 Jan 2017 06:48 )             29.6     08 Jan 2017 06:48    141    |  103    |  21     ----------------------------<  139    3.5     |  30     |  3.37     Ca    8.5        08 Jan 2017 06:48      Assessment: 86 year old female with ESRD on HD a/w sepsis 2/2 acute enterocolitis, cholelithiasis    Plan:  - continue with antibiotics per ID  - continue regular ([facility]) diet as tolerated  - continue DVT prophylaxis  - continue care per medical team  - will discuss with Dr. Leong
Patient seen and examined bedside resting comfortably. c/o moderate RUQ pain  No acute issues overnight  Denies nausea, vomiting.  No flatus/BM.   Denies chest pain, dyspnea, cough.    Vital Signs Last 24 Hrs  T(C): 37.6, Max: 38.8 ( @ 00:18)  T(F): 99.7, Max: 101.8 ( @ 00:18)  HR: 72 (72 - 91)  BP: 151/55 (127/56 - 151/55)  BP(mean): --  RR: 16 (16 - 17)  SpO2: 98% (96% - 100%)  I&O's Summary      PHYSICAL EXAM:    GENERAL: Alert, NAD  CHEST/LUNG: Clear to percussion bilaterally; No rales, rhonchi, wheezing, or rubs  HEART: Regular rate and rhythm; No murmurs, rubs, or gallops  ABDOMEN: mildly distended, +bs, +ruq ttp  EXTREMITIES:  No calf tenderness, No clubbing, cyanosis, or edema      LABS:                        9.9    20.7  )-----------( 212      ( 2017 07:38 )             29.8     2017 07:38    142    |  102    |  39     ----------------------------<  154    3.9     |  29     |  4.48     Ca    10.0       2017 07:38    TPro  8.0    /  Alb  3.2    /  TBili  0.5    /  DBili  x      /  AST  43     /  ALT  17     /  AlkPhos  105    2017 14:15    PT/INR - ( 2017 14:15 )   PT: 11.6 sec;   INR: 1.04 ratio         PTT - ( 2017 14:15 )  PTT:35.6 sec  Urinalysis Basic - ( 2017 01:24 )    Color: Yellow / Appearance: Clear / S.010 / pH: x  Gluc: x / Ketone: Negative  / Bili: Negative / Urobili: Negative mg/dL   Blood: x / Protein: 100 mg/dL / Nitrite: Negative   Leuk Esterase: Trace / RBC: 0-2 /HPF / WBC 0-2   Sq Epi: x / Non Sq Epi: Occasional / Bacteria: Few      Culture Results:   No growth to date. (01-06 @ 09:14)  Culture Results:   No growth to date. ( @ 17:40)  Culture Results:   No growth to date. ( @ 17:40)        RADIOLOGY & ADDITIONAL STUDIES:    Assessment:86y Female with ESRD on HD, cholelithisis    Plan:  -hida scan  -continue DVT prophylaxis  -cont care per medical team  -will discuss pt. with surgical attending
Patient seen in follow up for     MEDICATIONS  (STANDING):  heparin  Injectable 5000Unit(s) SubCutaneous every 12 hours  insulin lispro (HumaLOG) corrective regimen sliding scale  SubCutaneous three times a day before meals  dextrose 5%. 1000milliLiter(s) IV Continuous <Continuous>  dextrose 50% Injectable 12.5Gram(s) IV Push once  dextrose 50% Injectable 25Gram(s) IV Push once  piperacillin/tazobactam IVPB. 3.375Gram(s) IV Intermittent every 12 hours  sodium chloride 0.45%. 1000milliLiter(s) IV Continuous <Continuous>  clopidogrel Tablet 75milliGRAM(s) Oral daily  levETIRAcetam 750milliGRAM(s) Oral two times a day  metroNIDAZOLE  IVPB 500milliGRAM(s) IV Intermittent every 8 hours  metroNIDAZOLE  IVPB  IV Intermittent   pantoprazole    Tablet 40milliGRAM(s) Oral before breakfast  amLODIPine   Tablet 10milliGRAM(s) Oral daily  triamcinolone 0.1% Cream 1Application(s) Topical two times a day    MEDICATIONS  (PRN):  acetaminophen  Suppository 650milliGRAM(s) Rectal every 6 hours PRN For Temp greater than 38 C (100.4 F)  dextrose Gel 1Dose(s) Oral once PRN Blood Glucose LESS THAN 70 milliGRAM(s)/deciliter  glucagon  Injectable 1milliGRAM(s) IntraMuscular once PRN Glucose LESS THAN 70 milligrams/deciliter    PHYSICAL EXAM:      T(C): 37, Max: 37.4 (01-10 @ 16:15)  HR: 70 (70 - 78)  BP: 158/67 (136/59 - 168/68)  RR: 16 (16 - 19)  SpO2: 100% (95% - 100%)  Wt(kg): --  Respiratory: clear anteriorly, decreased BS at bases  Cardiovascular: S1 S2  Gastrointestinal: soft NT ND +BS  Extremities:    tr edema  AV access site mild erythema, clear                              10.0   9.0   )-----------( 312      ( 11 Jan 2017 07:30 )             31.1     11 Jan 2017 07:30    141    |  104    |  31     ----------------------------<  222    3.1     |  26     |  4.77     Ca    9.3        11 Jan 2017 07:30    TPro  7.0    /  Alb  2.4    /  TBili  0.4    /  DBili  x      /  AST  24     /  ALT  23     /  AlkPhos  98     11 Jan 2017 07:30      LIVER FUNCTIONS - ( 11 Jan 2017 07:30 )  Alb: 2.4 g/dL / Pro: 7.0 gm/dL / ALK PHOS: 98 U/L / ALT: 23 U/L / AST: 24 U/L / GGT: x             Assessment and Plan:    Maintenance HD, clincally stable.  4 k bath, replete K po as well.
Patient seen in follow up for ESRD, abd pain much improved. WBC improved.    MEDICATIONS  (STANDING):  heparin  Injectable 5000Unit(s) SubCutaneous every 12 hours  insulin lispro (HumaLOG) corrective regimen sliding scale  SubCutaneous three times a day before meals  dextrose 5%. 1000milliLiter(s) IV Continuous <Continuous>  dextrose 50% Injectable 12.5Gram(s) IV Push once  dextrose 50% Injectable 25Gram(s) IV Push once  piperacillin/tazobactam IVPB. 3.375Gram(s) IV Intermittent every 12 hours  sodium chloride 0.45%. 1000milliLiter(s) IV Continuous <Continuous>  clopidogrel Tablet 75milliGRAM(s) Oral daily  levETIRAcetam 750milliGRAM(s) Oral two times a day  metroNIDAZOLE  IVPB 500milliGRAM(s) IV Intermittent every 8 hours  metroNIDAZOLE  IVPB  IV Intermittent   pantoprazole    Tablet 40milliGRAM(s) Oral before breakfast  potassium chloride    Tablet ER 20milliEquivalent(s) Oral once    MEDICATIONS  (PRN):  acetaminophen  Suppository 650milliGRAM(s) Rectal every 6 hours PRN For Temp greater than 38 C (100.4 F)  dextrose Gel 1Dose(s) Oral once PRN Blood Glucose LESS THAN 70 milliGRAM(s)/deciliter  glucagon  Injectable 1milliGRAM(s) IntraMuscular once PRN Glucose LESS THAN 70 milligrams/deciliter    PHYSICAL EXAM:      T(C): 37, Max: 37 (01-08 @ 17:05)  HR: 71 (68 - 80)  BP: 161/73 (133/46 - 169/58)  RR: 17 (17 - 18)  SpO2: 99% (96% - 100%)  Wt(kg): --  Respiratory: clear anteriorly, decreased BS at bases  Cardiovascular: S1 S2  Gastrointestinal: soft NT ND +BS  Extremities:  1 +  edema                                    9.8    8.2   )-----------( 252      ( 09 Jan 2017 07:17 )             29.7     09 Jan 2017 07:17    139    |  102    |  35     ----------------------------<  193    3.4     |  27     |  4.71     Ca    8.7        09 Jan 2017 07:17            Assessment and Plan:  Maintenance HD; clinically stable.  IV abx as per ID;   Will follow.
Subjective: no complaints. Well tolerated dialysis yest       MEDICATIONS  (STANDING):  heparin  Injectable 5000Unit(s) SubCutaneous every 12 hours  insulin lispro (HumaLOG) corrective regimen sliding scale  SubCutaneous three times a day before meals  dextrose 5%. 1000milliLiter(s) IV Continuous <Continuous>  dextrose 50% Injectable 12.5Gram(s) IV Push once  dextrose 50% Injectable 25Gram(s) IV Push once  piperacillin/tazobactam IVPB. 3.375Gram(s) IV Intermittent every 12 hours  pantoprazole  Injectable 40milliGRAM(s) IV Push daily  sodium chloride 0.45%. 1000milliLiter(s) IV Continuous <Continuous>  clopidogrel Tablet 75milliGRAM(s) Oral daily  levETIRAcetam 750milliGRAM(s) Oral two times a day  metroNIDAZOLE  IVPB 500milliGRAM(s) IV Intermittent every 8 hours  metroNIDAZOLE  IVPB  IV Intermittent     MEDICATIONS  (PRN):  acetaminophen  Suppository 650milliGRAM(s) Rectal every 6 hours PRN For Temp greater than 38 C (100.4 F)  dextrose Gel 1Dose(s) Oral once PRN Blood Glucose LESS THAN 70 milliGRAM(s)/deciliter  glucagon  Injectable 1milliGRAM(s) IntraMuscular once PRN Glucose LESS THAN 70 milligrams/deciliter          T(C): 37.1, Max: 38.7 (-08 @ 00:34)  HR: 63 (63 - 103)  BP: 138/63 (138/63 - 180/56)  RR: 18 (16 - 18)  SpO2: 99% (97% - 100%)  Wt(kg): --        I&O's Detail           PHYSICAL EXAM:    GENERAL: no distress  EYES: EOMI, PERRLA, conjunctiva and sclera clear  NECK: Supple, no inc in JVP  CHEST/LUNG: Clear  HEART: S1S2  ABDOMEN: Soft, RUQ tenderness  EXTREMITIES:  no edema  NEURO: no asterixis  ACCESS: L AVF pos thrill, bruit      LABS:  CBC Full  -  ( 2017 06:48 )  WBC Count : 10.5 K/uL  Hemoglobin : 9.8 g/dL  Hematocrit : 29.6 %  Platelet Count - Automated : 219 K/uL  Mean Cell Volume : 87.5 fl  Mean Cell Hemoglobin : 28.9 pg  Mean Cell Hemoglobin Concentration : 33.0 gm/dL  Auto Neutrophil # : x  Auto Lymphocyte # : x  Auto Monocyte # : x  Auto Eosinophil # : x  Auto Basophil # : x  Auto Neutrophil % : x  Auto Lymphocyte % : x  Auto Monocyte % : x  Auto Eosinophil % : x  Auto Basophil % : x    2017 06:48    141    |  103    |  21     ----------------------------<  139    3.5     |  30     |  3.37     Ca    8.5        2017 06:48        Urinalysis Basic - ( 2017 01:24 )    Color: Yellow / Appearance: Clear / S.010 / pH: x  Gluc: x / Ketone: Negative  / Bili: Negative / Urobili: Negative mg/dL   Blood: x / Protein: 100 mg/dL / Nitrite: Negative   Leuk Esterase: Trace / RBC: 0-2 /HPF / WBC 0-2   Sq Epi: x / Non Sq Epi: Occasional / Bacteria: Few      Culture Results:   No growth to date. ( @ 09:14)  Culture Results:   No growth ( @ 09:01)  Culture Results:   No growth ( @ 09:01)  Culture Results:   No growth to date. ( @ 17:40)  Culture Results:   No growth to date. ( @ 17:40)      CTA:  Segmental thickening cecum and terminal ileum. Favor   inflammatory/infectious ileocolitis. Neoplasm not excluded. Consider   endoscopic correlation.  Cystic lesions in the pancreatic head and tail as described. Correlate   with MR if there are no contraindications.  Additional findings as discussed      ASSESSMENT and PLAN:    * Abd pain -- CTA s/o ileocolitis. Cystic lesion in pancreatic head noted. GI f/u  * Stable HD yest. Next HD 1/10
Subjective: still with intermittent abd pain. Pos distension.       MEDICATIONS  (STANDING):  heparin  Injectable 5000Unit(s) SubCutaneous every 12 hours  insulin lispro (HumaLOG) corrective regimen sliding scale  SubCutaneous three times a day before meals  dextrose 5%. 1000milliLiter(s) IV Continuous <Continuous>  dextrose 50% Injectable 12.5Gram(s) IV Push once  dextrose 50% Injectable 25Gram(s) IV Push once  piperacillin/tazobactam IVPB. 3.375Gram(s) IV Intermittent every 12 hours  pantoprazole  Injectable 40milliGRAM(s) IV Push daily  sodium chloride 0.45%. 1000milliLiter(s) IV Continuous <Continuous>  clopidogrel Tablet 75milliGRAM(s) Oral daily  levETIRAcetam 750milliGRAM(s) Oral two times a day    MEDICATIONS  (PRN):  acetaminophen  Suppository 650milliGRAM(s) Rectal every 6 hours PRN For Temp greater than 38 C (100.4 F)  dextrose Gel 1Dose(s) Oral once PRN Blood Glucose LESS THAN 70 milliGRAM(s)/deciliter  glucagon  Injectable 1milliGRAM(s) IntraMuscular once PRN Glucose LESS THAN 70 milligrams/deciliter          T(C): 37.7, Max: 38.8 (-07 @ 00:18)  HR: 82 (79 - 91)  BP: 127/56 (127/56 - 149/56)  RR: 17 (16 - 17)  SpO2: 96% (96% - 100%)  Wt(kg): --    ABG - ( 2017 14:42 )  pH: x     /  pCO2: 38    /  pO2: 70    / HCO3: 31    / Base Excess: 7.9   /  SaO2: 94                 PHYSICAL EXAM:    GENERAL: mild anxiety  EYES: EOMI, PERRLA, conjunctiva and sclera clear  NECK: Supple, no inc in JVP  CHEST/LUNG: Clear  HEART: S1S2  ABDOMEN: Soft, distended, pos RUQ tenderness  EXTREMITIES: no edema   NEURO: no asterixis  ACCESS: L AVF pos thrill, bruit    LABS:  CBC Full  -  ( 2017 07:38 )  WBC Count : 20.7 K/uL  Hemoglobin : 9.9 g/dL  Hematocrit : 29.8 %  Platelet Count - Automated : 212 K/uL  Mean Cell Volume : 88.6 fl  Mean Cell Hemoglobin : 29.4 pg  Mean Cell Hemoglobin Concentration : 33.2 gm/dL  Auto Neutrophil # : 17.9 K/uL  Auto Lymphocyte # : 1.5 K/uL  Auto Monocyte # : 1.0 K/uL  Auto Eosinophil # : 0.3 K/uL  Auto Basophil # : 0.1 K/uL  Auto Neutrophil % : 86.4 %  Auto Lymphocyte % : 7.2 %  Auto Monocyte % : 4.7 %  Auto Eosinophil % : 1.3 %  Auto Basophil % : 0.3 %    2017 07:38    142    |  102    |  39     ----------------------------<  154    3.9     |  29     |  4.48     Ca    10.0       2017 07:38    TPro  8.0    /  Alb  3.2    /  TBili  0.5    /  DBili  x      /  AST  43     /  ALT  17     /  AlkPhos  105    2017 14:15    PT/INR - ( 2017 14:15 )   PT: 11.6 sec;   INR: 1.04 ratio         PTT - ( 2017 14:15 )  PTT:35.6 sec  Urinalysis Basic - ( 2017 01:24 )    Color: Yellow / Appearance: Clear / S.010 / pH: x  Gluc: x / Ketone: Negative  / Bili: Negative / Urobili: Negative mg/dL   Blood: x / Protein: 100 mg/dL / Nitrite: Negative   Leuk Esterase: Trace / RBC: 0-2 /HPF / WBC 0-2   Sq Epi: x / Non Sq Epi: Occasional / Bacteria: Few      Culture Results:   No growth to date. ( @ 17:40)  Culture Results:   No growth to date. ( @ 17:40)      ASSESSMENT and PLAN:    ESRD, sepsis, prob cholecystitis    * For abd CTA this am  * Maint HD following CTA. Goal UF 1.5-2kg.
Patient is a 86y old  Female who presents with a chief complaint of     SUBJECTIVE/OBJECTIVE:  Awake, alert, resting comfortable.  Daughter at bedside.    MEDICATIONS  (STANDING):  heparin  Injectable 5000Unit(s) SubCutaneous every 12 hours  insulin lispro (HumaLOG) corrective regimen sliding scale  SubCutaneous three times a day before meals  dextrose 5%. 1000milliLiter(s) IV Continuous <Continuous>  dextrose 50% Injectable 12.5Gram(s) IV Push once  dextrose 50% Injectable 25Gram(s) IV Push once  piperacillin/tazobactam IVPB. 3.375Gram(s) IV Intermittent every 12 hours  pantoprazole  Injectable 40milliGRAM(s) IV Push daily  sodium chloride 0.45%. 1000milliLiter(s) IV Continuous <Continuous>  clopidogrel Tablet 75milliGRAM(s) Oral daily  levETIRAcetam 750milliGRAM(s) Oral two times a day  metroNIDAZOLE  IVPB 500milliGRAM(s) IV Intermittent every 8 hours  metroNIDAZOLE  IVPB  IV Intermittent     MEDICATIONS  (PRN):  acetaminophen  Suppository 650milliGRAM(s) Rectal every 6 hours PRN For Temp greater than 38 C (100.4 F)  dextrose Gel 1Dose(s) Oral once PRN Blood Glucose LESS THAN 70 milliGRAM(s)/deciliter  glucagon  Injectable 1milliGRAM(s) IntraMuscular once PRN Glucose LESS THAN 70 milligrams/deciliter      Allergies    No Known Allergies    Intolerances          Vital Signs Last 24 Hrs  T(C): 37.1, Max: 38.7 (08 @ 00:34)  T(F): 98.8, Max: 101.6 (- @ 00:34)  HR: 81 (63 - 103)  BP: 158/56 (138/63 - 180/56)  BP(mean): --  RR: 18 (16 - 18)  SpO2: 96% (96% - 100%)      PHYSICAL EXAM:  GENERAL: NAD, well-groomed, well-developed  HEAD:  Atraumatic, Normocephalic  EYES: EOMI, PERRLA, conjunctiva and sclera clear  ENMT: No tonsillar erythema, exudates, or enlargement; Moist mucous membranes, No lesions  NECK: Supple, No JVD, Normal thyroid  NERVOUS SYSTEM:  Alert , Motor Strength 4/5 B/L upper and lower extremities; DTRs 2+ intact and symmetric  CHEST/LUNG: BS decreased bilaterally bases; No rales, rhonchi, wheezing, or rubs  HEART: Regular rate and rhythm; 2/6 murmurs, No rubs, or gallops  ABDOMEN: Soft, Nontender, Nondistended; Bowel sounds present  EXTREMITIES:  2+ Peripheral Pulses, No clubbing, cyanosis,  Trace pedal edema  LYMPH: No lymphadenopathy noted  SKIN: No rashes or lesions    LABS:                        9.8    10.5  )-----------( 219      ( 2017 06:48 )             29.6     2017 06:48    141    |  103    |  21     ----------------------------<  139    3.5     |  30     |  3.37     Ca    8.5        2017 06:48        Urinalysis Basic - ( 2017 01:24 )    Color: Yellow / Appearance: Clear / S.010 / pH: x  Gluc: x / Ketone: Negative  / Bili: Negative / Urobili: Negative mg/dL   Blood: x / Protein: 100 mg/dL / Nitrite: Negative   Leuk Esterase: Trace / RBC: 0-2 /HPF / WBC 0-2   Sq Epi: x / Non Sq Epi: Occasional / Bacteria: Few      CAPILLARY BLOOD GLUCOSE  143 (2017 12:54)  144 (2017 08:40)  119 (2017 21:10)  207 (2017 16:26)    Lipid panel:         RADIOLOGY & ADDITIONAL TESTS:        Consultant(s) Notes Reviewed:  [ ] YES  [ ] NO
Patient is a 86y old  Female who presents with a chief complaint of     SUBJECTIVE/OBJECTIVE:  Resting comfortable.     MEDICATIONS  (STANDING):  heparin  Injectable 5000Unit(s) SubCutaneous every 12 hours  insulin lispro (HumaLOG) corrective regimen sliding scale  SubCutaneous three times a day before meals  dextrose 5%. 1000milliLiter(s) IV Continuous <Continuous>  dextrose 50% Injectable 12.5Gram(s) IV Push once  dextrose 50% Injectable 25Gram(s) IV Push once  piperacillin/tazobactam IVPB. 3.375Gram(s) IV Intermittent every 12 hours  sodium chloride 0.45%. 1000milliLiter(s) IV Continuous <Continuous>  clopidogrel Tablet 75milliGRAM(s) Oral daily  levETIRAcetam 750milliGRAM(s) Oral two times a day  metroNIDAZOLE  IVPB 500milliGRAM(s) IV Intermittent every 8 hours  metroNIDAZOLE  IVPB  IV Intermittent   pantoprazole    Tablet 40milliGRAM(s) Oral before breakfast  potassium chloride    Tablet ER 20milliEquivalent(s) Oral once  amLODIPine   Tablet 10milliGRAM(s) Oral daily    MEDICATIONS  (PRN):  acetaminophen  Suppository 650milliGRAM(s) Rectal every 6 hours PRN For Temp greater than 38 C (100.4 F)  dextrose Gel 1Dose(s) Oral once PRN Blood Glucose LESS THAN 70 milliGRAM(s)/deciliter  glucagon  Injectable 1milliGRAM(s) IntraMuscular once PRN Glucose LESS THAN 70 milligrams/deciliter      Allergies    No Known Allergies    Intolerances          Vital Signs Last 24 Hrs  T(C): 37, Max: 37 (01-08 @ 17:05)  T(F): 98.6, Max: 98.6 (01-08 @ 17:05)  HR: 71 (68 - 80)  BP: 161/73 (133/46 - 169/58)  BP(mean): --  RR: 17 (17 - 18)  SpO2: 99% (96% - 100%)      PHYSICAL EXAM:  GENERAL: NAD, well-groomed, well-developed  HEAD:  Atraumatic, Normocephalic  EYES: EOMI, PERRLA, conjunctiva and sclera clear  ENMT: No tonsillar erythema, exudates, or enlargement; Moist mucous membranes, No lesions  NECK: Supple, No JVD, Normal thyroid  NERVOUS SYSTEM:  Alert , Motor Strength 4/5 B/L upper and lower extremities; DTRs 2+ intact and symmetric  CHEST/LUNG: BS decreased bilaterally bases; No rales, rhonchi, wheezing, or rubs  HEART: Regular rate and rhythm; 2/6 murmurs, No rubs, or gallops  ABDOMEN: Soft, Nontender, Nondistended; Bowel sounds present  EXTREMITIES:  2+ Peripheral Pulses, No clubbing, cyanosis,  Trace pedal edema  LYMPH: No lymphadenopathy noted  SKIN: No rashes or lesions    LABS:                        9.8    8.2   )-----------( 252      ( 09 Jan 2017 07:17 )             29.7     09 Jan 2017 07:17    139    |  102    |  35     ----------------------------<  193    3.4     |  27     |  4.71     Ca    8.7        09 Jan 2017 07:17          CAPILLARY BLOOD GLUCOSE  201 (09 Jan 2017 08:01)  195 (08 Jan 2017 22:04)    Lipid panel:         RADIOLOGY & ADDITIONAL TESTS:        Consultant(s) Notes Reviewed:  [ ] YES  [ ] NO
Patient is a 86y old  Female who presents with a chief complaint of     SUBJECTIVE/OBJECTIVE:  Without complaints. Patient resting comfortably.       MEDICATIONS  (STANDING):  heparin  Injectable 5000Unit(s) SubCutaneous every 12 hours  insulin lispro (HumaLOG) corrective regimen sliding scale  SubCutaneous three times a day before meals  dextrose 5%. 1000milliLiter(s) IV Continuous <Continuous>  dextrose 50% Injectable 12.5Gram(s) IV Push once  dextrose 50% Injectable 25Gram(s) IV Push once  piperacillin/tazobactam IVPB. 3.375Gram(s) IV Intermittent every 12 hours  sodium chloride 0.45%. 1000milliLiter(s) IV Continuous <Continuous>  clopidogrel Tablet 75milliGRAM(s) Oral daily  levETIRAcetam 750milliGRAM(s) Oral two times a day  metroNIDAZOLE  IVPB 500milliGRAM(s) IV Intermittent every 8 hours  metroNIDAZOLE  IVPB  IV Intermittent   pantoprazole    Tablet 40milliGRAM(s) Oral before breakfast  amLODIPine   Tablet 10milliGRAM(s) Oral daily    MEDICATIONS  (PRN):  acetaminophen  Suppository 650milliGRAM(s) Rectal every 6 hours PRN For Temp greater than 38 C (100.4 F)  dextrose Gel 1Dose(s) Oral once PRN Blood Glucose LESS THAN 70 milliGRAM(s)/deciliter  glucagon  Injectable 1milliGRAM(s) IntraMuscular once PRN Glucose LESS THAN 70 milligrams/deciliter      Allergies    No Known Allergies    Intolerances          Vital Signs Last 24 Hrs  T(C): 36.9, Max: 37.4 (01-10 @ 00:30)  T(F): 98.4, Max: 99.4 (01-10 @ 00:30)  HR: 77 (74 - 90)  BP: 148/60 (148/60 - 180/74)  BP(mean): --  RR: 18 (17 - 18)  SpO2: 97% (96% - 100%)    PHYSICAL EXAM:  GENERAL: NAD, well-groomed, well-developed  HEAD:  Atraumatic, Normocephalic  EYES: EOMI, PERRLA, conjunctiva and sclera clear  ENMT: No tonsillar erythema, exudates, or enlargement; Moist mucous membranes, No lesions  NECK: Supple, No JVD, Normal thyroid  NERVOUS SYSTEM:  Alert , Motor Strength 4/5 B/L upper and lower extremities; DTRs 2+ intact and symmetric  CHEST/LUNG: BS decreased bilaterally bases; No rales, rhonchi, wheezing, or rubs  HEART: Regular rate and rhythm; 2/6 murmurs, No rubs, or gallops  ABDOMEN: Soft, Nontender, Nondistended; Bowel sounds present  EXTREMITIES:  2+ Peripheral Pulses, No clubbing, cyanosis,    Trace pedal edema  LYMPH: No lymphadenopathy noted  SKIN: No rashes or lesion      LABS:                        9.8    8.2   )-----------( 252      ( 09 Jan 2017 07:17 )             29.7     09 Jan 2017 07:17    139    |  102    |  35     ----------------------------<  193    3.4     |  27     |  4.71     Ca    8.7        09 Jan 2017 07:17          CAPILLARY BLOOD GLUCOSE  294 (10 Rob 2017 12:11)  143 (10 Rob 2017 07:59)  138 (09 Jan 2017 21:21)  176 (09 Jan 2017 18:33)    Lipid panel:         RADIOLOGY & ADDITIONAL TESTS:        Consultant(s) Notes Reviewed:  [ ] YES  [ ] NO
HPI:  88yo, BF with h/o ESRD 0n dialysis, Sz, DM2,HTN.   BIBA from home accompanied with her daughter and home aide c/o pt not being herself, the aide called the daugter at 8:50am informing her that she was not responding, pt normally is able to verbalize, her daughter got to the home at 9:30am and found her mother to be lethargic and not answering questions, pt is normally verbal, she observed her for the next few hours, she did not improve.  Daughter does not note  any cough, fever, chills, sob, cp.  In ER, patient lethargic with wbc 20.   i saw on 1/6   stat ct abdominal ;ileo colitis   all resolved     No Known Allergies    Intolerances        MEDICATIONS  (STANDING):  heparin  Injectable 5000Unit(s) SubCutaneous every 12 hours  insulin lispro (HumaLOG) corrective regimen sliding scale  SubCutaneous three times a day before meals  dextrose 5%. 1000milliLiter(s) IV Continuous <Continuous>  dextrose 50% Injectable 12.5Gram(s) IV Push once  dextrose 50% Injectable 25Gram(s) IV Push once  piperacillin/tazobactam IVPB. 3.375Gram(s) IV Intermittent every 12 hours  sodium chloride 0.45%. 1000milliLiter(s) IV Continuous <Continuous>  clopidogrel Tablet 75milliGRAM(s) Oral daily  levETIRAcetam 750milliGRAM(s) Oral two times a day  metroNIDAZOLE  IVPB 500milliGRAM(s) IV Intermittent every 8 hours  metroNIDAZOLE  IVPB  IV Intermittent   pantoprazole    Tablet 40milliGRAM(s) Oral before breakfast  potassium chloride    Tablet ER 20milliEquivalent(s) Oral once  amLODIPine   Tablet 10milliGRAM(s) Oral daily    MEDICATIONS  (PRN):  acetaminophen  Suppository 650milliGRAM(s) Rectal every 6 hours PRN For Temp greater than 38 C (100.4 F)  dextrose Gel 1Dose(s) Oral once PRN Blood Glucose LESS THAN 70 milliGRAM(s)/deciliter  glucagon  Injectable 1milliGRAM(s) IntraMuscular once PRN Glucose LESS THAN 70 milligrams/deciliter      REVIEW OF SYSTEMS:  not eating   much aide by bedside ate zero lunch   Breakfast ??/     VITAL SIGNS:  T(C): 36.7, Max: 37 (01-08 @ 17:05)  T(F): 98, Max: 98.6 (01-08 @ 17:05)  HR: 80 (68 - 80)  BP: 161/74 (133/46 - 169/58)  RR: 17 (17 - 18)  SpO2: 99% (96% - 100%)  Wt(kg): --    PHYSICAL EXAM:    GENERAL: not in any distress  HEENT: Neck is supple, normocephalic, atraumatic   CHEST/LUNG: Clear to percussion bilaterally; No rales, rhonchi, wheezing  HEART: Regular rate and rhythm; No murmurs, rubs, or gallops  ABDOMEN: Soft, Nontender, Nondistended; Bowel sounds present, no rebound   EXTREMITIES:  2+ Peripheral Pulses, No clubbing, cyanosis, no edema   SKIN: No rashes or lesions  BACK: no pressor sore   NERVOUS SYSTEM:  Alert & Oriented X3, Good concentration  PSYCH: normal affect     LABS:                         9.8    8.2   )-----------( 252      ( 09 Jan 2017 07:17 )             29.7     09 Jan 2017 07:17    139    |  102    |  35     ----------------------------<  193    3.4     |  27     |  4.71     Ca    8.7        09 Jan 2017 07:17    Culture Results:   No growth to date. (01-08 @ 00:38)  Culture Results:   No growth (01-07 @ 10:18)  Culture Results:   No growth to date. (01-06 @ 09:14)  Culture Results:   No growth (01-06 @ 09:01)  Culture Results:   No growth (01-06 @ 09:01)  Culture Results:   No growth to date. (01-05 @ 17:40)  Culture Results:   No growth to date. (01-05 @ 17:40)                Radiology:
Patient is a 86y old  Female who presents with a chief complaint of     SUBJECTIVE/OBJECTIVE:   Awake, alert, eating a little.  Daughter at bedside.    MEDICATIONS  (STANDING):  heparin  Injectable 5000Unit(s) SubCutaneous every 12 hours  insulin lispro (HumaLOG) corrective regimen sliding scale  SubCutaneous three times a day before meals  dextrose 5%. 1000milliLiter(s) IV Continuous <Continuous>  dextrose 50% Injectable 12.5Gram(s) IV Push once  dextrose 50% Injectable 25Gram(s) IV Push once  piperacillin/tazobactam IVPB. 3.375Gram(s) IV Intermittent every 12 hours  pantoprazole  Injectable 40milliGRAM(s) IV Push daily  sodium chloride 0.45%. 1000milliLiter(s) IV Continuous <Continuous>  clopidogrel Tablet 75milliGRAM(s) Oral daily  levETIRAcetam 750milliGRAM(s) Oral two times a day  metroNIDAZOLE  IVPB 500milliGRAM(s) IV Intermittent once  metroNIDAZOLE  IVPB 500milliGRAM(s) IV Intermittent every 8 hours  vancomycin  IVPB 1000milliGRAM(s) IV Intermittent once  metroNIDAZOLE  IVPB  IV Intermittent     MEDICATIONS  (PRN):  acetaminophen  Suppository 650milliGRAM(s) Rectal every 6 hours PRN For Temp greater than 38 C (100.4 F)  dextrose Gel 1Dose(s) Oral once PRN Blood Glucose LESS THAN 70 milliGRAM(s)/deciliter  glucagon  Injectable 1milliGRAM(s) IntraMuscular once PRN Glucose LESS THAN 70 milligrams/deciliter      Allergies    No Known Allergies    Intolerances          Vital Signs Last 24 Hrs  T(C): 37.6, Max: 38.8 ( @ 00:18)  T(F): 99.7, Max: 101.8 ( @ 00:18)  HR: 72 (72 - 91)  BP: 151/55 (127/56 - 151/55)  BP(mean): --  RR: 16 (16 - 17)  SpO2: 98% (96% - 100%)    PHYSICAL EXAM:  GENERAL: NAD, well-groomed, well-developed  HEAD:  Atraumatic, Normocephalic  EYES: EOMI, PERRLA, conjunctiva and sclera clear  ENMT: No tonsillar erythema, exudates, or enlargement; Moist mucous membranes, No lesions  NECK: Supple, No JVD, Normal thyroid  NERVOUS SYSTEM:  Alert , Motor Strength 4/5 B/L upper and lower extremities; DTRs 2+ intact and symmetric  CHEST/LUNG: BS decreased bilaterally bases; No rales, rhonchi, wheezing, or rubs  HEART: Regular rate and rhythm; 2/6 murmurs, No rubs, or gallops  ABDOMEN: Soft, Nontender, Nondistended; Bowel sounds present  EXTREMITIES:  2+ Peripheral Pulses, No clubbing, cyanosis,  Trace pedal edema  LYMPH: No lymphadenopathy noted  SKIN: No rashes or lesions    LABS:                        9.9    20.7  )-----------( 212      ( 2017 07:38 )             29.8     2017 07:38    142    |  102    |  39     ----------------------------<  154    3.9     |  29     |  4.48     Ca    10.0       2017 07:38    TPro  8.0    /  Alb  3.2    /  TBili  0.5    /  DBili  x      /  AST  43     /  ALT  17     /  AlkPhos  105    2017 14:15    PT/INR - ( 2017 14:15 )   PT: 11.6 sec;   INR: 1.04 ratio         PTT - ( 2017 14:15 )  PTT:35.6 sec  Urinalysis Basic - ( 2017 01:24 )    Color: Yellow / Appearance: Clear / S.010 / pH: x  Gluc: x / Ketone: Negative  / Bili: Negative / Urobili: Negative mg/dL   Blood: x / Protein: 100 mg/dL / Nitrite: Negative   Leuk Esterase: Trace / RBC: 0-2 /HPF / WBC 0-2   Sq Epi: x / Non Sq Epi: Occasional / Bacteria: Few      CAPILLARY BLOOD GLUCOSE  183 (2017 11:23)  212 (2017 08:30)  237 (2017 22:33)  155 (2017 18:21)    Lipid panel:   CARDIAC MARKERS ( 2017 16:27 )  .112 ng/mL / x     / 32 U/L / x     / 0.6 ng/mL    Patient is a 86y old  Female who presents with a chief complaint of     SUBJECTIVE/OBJECTIVE:    MEDICATIONS  (STANDING):  heparin  Injectable 5000Unit(s) SubCutaneous every 12 hours  insulin lispro (HumaLOG) corrective regimen sliding scale  SubCutaneous three times a day before meals  dextrose 5%. 1000milliLiter(s) IV Continuous <Continuous>  dextrose 50% Injectable 12.5Gram(s) IV Push once  dextrose 50% Injectable 25Gram(s) IV Push once  piperacillin/tazobactam IVPB. 3.375Gram(s) IV Intermittent every 12 hours  pantoprazole  Injectable 40milliGRAM(s) IV Push daily  sodium chloride 0.45%. 1000milliLiter(s) IV Continuous <Continuous>  clopidogrel Tablet 75milliGRAM(s) Oral daily  levETIRAcetam 750milliGRAM(s) Oral two times a day  metroNIDAZOLE  IVPB 500milliGRAM(s) IV Intermittent once  metroNIDAZOLE  IVPB 500milliGRAM(s) IV Intermittent every 8 hours  vancomycin  IVPB 1000milliGRAM(s) IV Intermittent once  metroNIDAZOLE  IVPB  IV Intermittent     MEDICATIONS  (PRN):  acetaminophen  Suppository 650milliGRAM(s) Rectal every 6 hours PRN For Temp greater than 38 C (100.4 F)  dextrose Gel 1Dose(s) Oral once PRN Blood Glucose LESS THAN 70 milliGRAM(s)/deciliter  glucagon  Injectable 1milliGRAM(s) IntraMuscular once PRN Glucose LESS THAN 70 milligrams/deciliter      Allergies    No Known Allergies    Intolerances          Vital Signs Last 24 Hrs  T(C): 37.6, Max: 38.8 ( @ 00:18)  T(F): 99.7, Max: 101.8 (- @ 00:18)  HR: 72 (72 - 91)  BP: 151/55 (127/56 - 151/55)  BP(mean): --  RR: 16 (16 - 17)  SpO2: 98% (96% - 100%)    PHYSICAL EXAM:  GENERAL: NAD, well-groomed, well-developed  HEAD:  Atraumatic, Normocephalic  EYES: EOMI, PERRLA, conjunctiva and sclera clear  ENMT: No tonsillar erythema, exudates, or enlargement; Moist mucous membranes, No lesions  NECK: Supple, No JVD, Normal thyroid  NERVOUS SYSTEM:  Alert & Oriented X3; Motor Strength 5/5 B/L upper and lower extremities; DTRs 2+ intact and symmetric  CHEST/LUNG: Clear bilaterally; No rales, rhonchi, wheezing, or rubs  HEART: Regular rate and rhythm; No murmurs, rubs, or gallops  ABDOMEN: Soft, Nontender, Nondistended; Bowel sounds present  EXTREMITIES:  2+ Peripheral Pulses, No clubbing, cyanosis, or edema  LYMPH: No lymphadenopathy noted  SKIN: No rashes or lesions    LABS:                        9.9    20.7  )-----------( 212      ( 2017 07:38 )             29.8     2017 07:38    142    |  102    |  39     ----------------------------<  154    3.9     |  29     |  4.48     Ca    10.0       2017 07:38    TPro  8.0    /  Alb  3.2    /  TBili  0.5    /  DBili  x      /  AST  43     /  ALT  17     /  AlkPhos  105    2017 14:15    PT/INR - ( 2017 14:15 )   PT: 11.6 sec;   INR: 1.04 ratio         PTT - ( 2017 14:15 )  PTT:35.6 sec  Urinalysis Basic - ( 2017 01:24 )    Color: Yellow / Appearance: Clear / S.010 / pH: x  Gluc: x / Ketone: Negative  / Bili: Negative / Urobili: Negative mg/dL   Blood: x / Protein: 100 mg/dL / Nitrite: Negative   Leuk Esterase: Trace / RBC: 0-2 /HPF / WBC 0-2   Sq Epi: x / Non Sq Epi: Occasional / Bacteria: Few      CAPILLARY BLOOD GLUCOSE  183 (2017 11:23)  212 (2017 08:30)  237 (2017 22:33)  155 (2017 18:21)    Lipid panel:   CARDIAC MARKERS ( 2017 16:27 )  .112 ng/mL / x     / 32 U/L / x     / 0.6 ng/mL    Culture - Blood (17 @ 09:14)    Specimen Source: .Blood Blood    Culture Results:   No growth to date.    Culture - Urine (17 @ 09:01)    Specimen Source: .Urine Catheterized    Culture Results:   No growth        RADIOLOGY & ADDITIONAL TESTS:      PROCEDURE DATE:  2017        INTERPRETATION:  History: Abdominal pain, sepsis.  CT abdomen and pelvis, double contrast. Comparison to noncontrast study   of 2017.    75 cc [id number] injected intravenously.  Trace basilar effusions and dependent atelectasis.  Distended gallbladder with stones are similar to prior. No biliary   dilatation. There is 2.5 cm multiloculated cystic lesion pancreatic head   and subcentimeter pancreatic cystic lesion pancreatic tail. Consider the   spectrum of IPMT versus other cystic pancreatic neoplasm, less likely   pseudocysts. Correlate with MR  No adrenal nodules. Bilateral renal cysts and nonobstructing calculi.  Nonaneurysmal aorta.  There is a short segment of circumferential wall thickening involving the   cecum, as well as the terminal ileum with mild associated fat stranding.   Findings likely inflammatory/infectious. Neoplastic etiology less likely   not entirely excluded. Follow-up with colonoscopy as indicated. No   evidence of appendicitis. No bowel obstruction. No suspicious adenopathy.  No extraluminal fluid or gas.  No adnexal pathology.  Droplet of gas within the nondependent urinary bladder similar to prior   may be related to recent instrumentation. Correlate clinically. Infection   not excluded  No acute osseous abnormality.    Impression:    Segmental thickening cecum and terminal ileum. Favor   inflammatory/infectious ileocolitis. Neoplasm not excluded. Consider   endoscopic correlation.  Cystic lesions in the pancreatic head and tail as described. Correlate   with MR if there are no contraindications.  Additional findings as discussed              THEA SHAIKH M.D., ATTENDING RADIOLOGIST  This documenthas been electronically signed. 2017 11:09AM              EXAM:  CHEST SINGLE VIEW                            PROCEDURE DATE:  2017        INTERPRETATION:  cough    A frontal chest film demonstrates prominence of interstitial markings   without consolidation.    The heart size and vascular markings are within normal limits for   technique.      No mediastinal or hilar adenopathy is suggested. .     The osseous structures appear intact intact.     IMPRESSION:  Prominence of interstitial markings without consolidation.        Consultant(s) Notes Reviewed:  [ xx] YES  [ ] NO

## 2017-01-11 NOTE — DISCHARGE NOTE ADULT - CARE PLAN
Principal Discharge DX:	Sepsis, due to unspecified organism  Goal:	resolve  Instructions for follow-up, activity and diet:	Medication  Secondary Diagnosis:	Ileocolitis  Goal:	resolve  Instructions for follow-up, activity and diet:	Medication and followup.  Secondary Diagnosis:	ESRD (end stage renal disease) on dialysis  Goal:	monitor  Instructions for follow-up, activity and diet:	Dialysis.  Secondary Diagnosis:	Essential hypertension  Goal:	normalize  Instructions for follow-up, activity and diet:	Medication.

## 2017-01-11 NOTE — PROGRESS NOTE ADULT - PROBLEM SELECTOR PLAN 1
ID consult noted  IV Vanco, Zosyn, IVF
ID consult noted  IV Vanco, Zosyn, IVF  Leukocytosis resolving, wbc 10.
antibiotics ; gi eval ???   not eating much noted  will check crp
culture to follow, negative so far  On empiric antibiotics with Zosyn
antibiotics ; gi eval
culture to follow, negative so far  On empiric antibiotics with Zosyn

## 2017-01-11 NOTE — DISCHARGE NOTE ADULT - PATIENT PORTAL LINK FT
“You can access the [employer] Patient Portal, offered by [facility], by registering with the following website: [email or web address]”

## 2017-01-11 NOTE — DISCHARGE NOTE ADULT - HOSPITAL COURSE
88yo, BF with h/o ESRD 0n dialysis, Sz, DM2,HTN.   BIBA from home accompanied with her daughter and home aide c/o pt not being herself, the aide called the daugter at 8:50am informing her that she was not responding, pt normally is able to verbalize, her daughter got to the home at 9:30am and found her mother to be lethargic and not answering questions, pt is normally verbal, she observed her for the next few hours, she did not improve.  Daughter does not note  any cough, fever, chills, sob, cp.  In ER, patient lethargic with wbc 20.  Admitted with Sepsis, ESRD found to have Acute Enterocolitis, seen by ID, Renal treated with Zosyn, Flagyl, dialysis per Renal and Surgery ruled out Cholecystitis.  The patient improved, wbc 9.0, patient awake, alert, medically stable and discharged home.

## 2017-01-11 NOTE — PROGRESS NOTE ADULT - PROBLEM SELECTOR PROBLEM 5
Diabetes mellitus of other type with hyperosmolarity, with long-term current use of insulin
Sepsis
Diabetes mellitus of other type with hyperosmolarity, with long-term current use of insulin
Sepsis

## 2017-01-11 NOTE — DISCHARGE NOTE ADULT - OTHER SIGNIFICANT FINDINGS
CT Head No Cont: EXAM:  CT BRAIN                      PROCEDURE DATE:  01/05/2017  INTERPRETATION:  CLINICAL INFORMATION: Altered mental status.COMPARISON: 12/16/2016.PROCEDURE:Axial sections of the brain were obtained from base to vertex, without the intravenous administration of contrast material. Sagittal and coronal reformats were then generated from the axial images.FINDINGS:Multiple images are degraded by motion.There is no intracranial hemorrhage, mass or shift of the midline structures. There are involutional changes. Small vessel white matter ischemic changes are noted. 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 Chronic basal ganglia and thalamic lacunar infarcts are noted.The brain stem is unremarkable.  No cerebellopontine angle lesion is appreciated. The fourth, third and lateral ventricles are normal position. Ventricular dilatation is stable. No subdural or epidural collections are present. No fracture or destructive lesion is identified. The sinuses and mastoids are clear.IMPRESSION:Involutional and ischemic gliotic changes.No acute intracranial hemorrhage.OSITO MORE M.D., ATTENDING RADIOLOGISTThis

## 2017-01-11 NOTE — DISCHARGE NOTE ADULT - FINDINGS/TREATMENT
As above.    05-Jan-2017 14:15, Complete Blood Count + Automated Diff  WBC Count:   20.0, [3.8 - 10.5 K/uL]  RBC Count: 3.95, [3.80 - 5.20 M/uL]  Hemoglobin: 11.6, [11.5 - 15.5 g/dL]  Hematocrit: 34.6, [34.5 - 45.0 %]  Mean Cell Volume: 87.5, [80.0 - 100.0 fl]  Mean Cell Hemoglobin: 29.3, [27.0 - 34.0 pg]  Mean Cell Hemoglobin Conc: 33.5, [32.0 - 36.0 gm/dL]  Red Cell Distrib Width:   15.3, [11.0 - 15.0 %] 05-Jan-2017 14:15, Acetone, Serum  Acetone, Serum: Negative, [Negative]    05-Jan-2017 14:15, Comprehensive Metabolic Panel  Sodium, Serum: 135, [135 - 145 mmol/L]  Potassium, Serum: 4.2, [3.5 - 5.3 mmol/L]  Chloride, Serum: 97, [96 - 108 mmol/L]  Carbon Dioxide, Serum:   32, [22 - 31 mmol/L]  Anion Gap, Serum: 6, [5 - 17 mmol/L]  Blood Urea Nitrogen, Serum:   31, [7 - 23 mg/dL]  Creatinine, Serum:   4.13, [0.50 - 1.30 mg/dL]  Glucose, Serum:   437, [70 - 99 mg/dL]  Calcium, Total Serum:   10.2, [8.5 - 10.1 mg/dL]  Protein Total, Serum: 8.0, [6.0 - 8.3 gm/dL]  Albumin, Serum:   3.2, [3.3 - 5.0 g/dL] 05-Jan-2017 14:15, Lactate, Blood  Lactate, Blood:   2.4, [0.7 - 2.0 mmol/L]    05-Jan-2017 16:27, CKMB Mass Assay  CKMB Units: 0.6, [0.5 - 3.6 ng/mL]  CPK Mass Assay %: 1.9, [0.0 - 3.5 %]    05-Jan-2017 16:27, Creatine Kinase, Serum 06-Jan-2017 07:38, Basic Metabolic Panel  Sodium, Serum: 142, [135 - 145 mmol/L]  Potassium, Serum: 3.9, [3.5 - 5.3 mmol/L]  Chloride, Serum: 102, [96 - 108 mmol/L]  Carbon Dioxide, Serum: 29, [22 - 31 mmol/L]  Anion Gap, Serum: 11, [5 - 17 mmol/L]  Blood Urea Nitrogen, Serum:   39, [7 - 23 mg/dL]  Creatinine, Serum:   4.48, [0.50 - 1.30 mg/dL]  Glucose, Serum:   154, [70 - 99 mg/dL]  Calcium, Total Serum: 10.0, [8.5 - 10.1 mg/dL]

## 2017-01-11 NOTE — PROGRESS NOTE ADULT - PROBLEM SELECTOR PROBLEM 2
ESRD (end stage renal disease) on dialysis
Ileocolitis
Pain of upper abdomen
Ileocolitis
Pain of upper abdomen

## 2017-01-11 NOTE — PROGRESS NOTE ADULT - PROVIDER SPECIALTY LIST ADULT
Cardiology
Infectious Disease
Nephrology
Surgery
Cardiology
Internal Medicine

## 2017-01-11 NOTE — PROGRESS NOTE ADULT - PROBLEM SELECTOR PLAN 4
Started Norvasc.
renal on case ; dialysed yesterday with left arm avf
On HD  Renal noted
Started Norvasc.
renal on case ; dialysed yesterday with left arm avf
On HD  Renal noted

## 2017-01-11 NOTE — DISCHARGE NOTE ADULT - CARE PROVIDER_API CALL
Jamia Flores), Medicine  [address]  [place] [address]  Phone: [phone]  Fax: [phone]    Marcial Hays), Internal Medicine; Nephrology  [address] [facility] [place] [id number]  Phone: [phone]  Fax: [phone]

## 2017-01-11 NOTE — PROGRESS NOTE ADULT - ASSESSMENT
Admitted with Sepsis,  ESRD.
Admitted with Sepsis, found to be due to Acute Enterocolitis and  ESRD.
Admitted with Sepsis, found to be due to Acute Enterocolitis and  ESRD.
Admitted with Sepsis, found to be due to Acute Enterocolitis and  ESRD.  Patient clinically improving.
resolving sepsis   acute enterocolitis   end stage renal disease
resolving sepsis   acute enterocolitis   end stage renal disease
sepsis   Altered Mental Status   end stage renal disease
sepsis   Altered Mental Status   end stage renal disease

## 2017-01-11 NOTE — PROGRESS NOTE ADULT - PROBLEM SELECTOR PROBLEM 1
Sepsis, due to unspecified organism
Ileocolitis
Sepsis, due to unspecified organism
Sepsis, due to unspecified organism
Ileocolitis

## 2017-01-11 NOTE — DISCHARGE NOTE ADULT - MEDICATION SUMMARY - MEDICATIONS TO TAKE
I will START or STAY ON the medications listed below when I get home from the hospital:    amLODIPine 10 mg oral tablet  -- 1 tab(s) by mouth once a day  -- Indication: For Essential hypertension    pantoprazole 40 mg oral delayed release tablet  -- 1 tab(s) by mouth once a day (before a meal)  -- Indication: For Ileocolitis    Cipro 500 mg oral tablet  -- 1 tab(s) by mouth every 12 hours  -- Avoid prolonged or excessive exposure to direct and/or artificial sunlight while taking this medication.  Check with your doctor before becoming pregnant.  Do not take dairy products, antacids, or iron preparations within one hour of this medication.  Finish all this medication unless otherwise directed by prescriber.  Medication should be taken with plenty of water.    -- Indication: For Ileocolitis

## 2017-01-11 NOTE — DISCHARGE NOTE ADULT - MEDICATION SUMMARY - MEDICATIONS TO CHANGE
I will SWITCH the dose or number of times a day I take the medications listed below when I get home from the hospital:    amLODIPine 5 mg oral tablet  -- 1 tab(s) by mouth once a day x 30 days I will SWITCH the dose or number of times a day I take the medications listed below when I get home from the hospital:  None

## 2017-01-11 NOTE — PROGRESS NOTE ADULT - PROBLEM SELECTOR PLAN 2
No reported diarrhea  Initial CT A/P showed layering gall stone, ? RUQ sonogram   CT A/P with IV contrast showed ileocolitis   GI evaluation for EGD/colonoscopy r/o underlying malignancy   On Flagyl with Zosyn

## 2017-01-11 NOTE — DISCHARGE NOTE ADULT - ADDITIONAL INSTRUCTIONS
Dialysis per Renal.  Please follow up with your primary care physician regarding your hospitalization.

## 2017-01-13 DIAGNOSIS — K80.80 OTHER CHOLELITHIASIS WITHOUT OBSTRUCTION: ICD-10-CM

## 2017-01-13 DIAGNOSIS — K86.2 CYST OF PANCREAS: ICD-10-CM

## 2017-01-13 DIAGNOSIS — I12.0 HYPERTENSIVE CHRONIC KIDNEY DISEASE WITH STAGE 5 CHRONIC KIDNEY DISEASE OR END STAGE RENAL DISEASE: ICD-10-CM

## 2017-01-13 DIAGNOSIS — R21 RASH AND OTHER NONSPECIFIC SKIN ERUPTION: ICD-10-CM

## 2017-01-13 DIAGNOSIS — E11.65 TYPE 2 DIABETES MELLITUS WITH HYPERGLYCEMIA: ICD-10-CM

## 2017-01-13 DIAGNOSIS — Z79.01 LONG TERM (CURRENT) USE OF ANTICOAGULANTS: ICD-10-CM

## 2017-01-13 DIAGNOSIS — K42.9 UMBILICAL HERNIA WITHOUT OBSTRUCTION OR GANGRENE: ICD-10-CM

## 2017-01-13 DIAGNOSIS — Z99.2 DEPENDENCE ON RENAL DIALYSIS: ICD-10-CM

## 2017-01-13 DIAGNOSIS — K52.9 NONINFECTIVE GASTROENTERITIS AND COLITIS, UNSPECIFIED: ICD-10-CM

## 2017-01-13 DIAGNOSIS — E03.9 HYPOTHYROIDISM, UNSPECIFIED: ICD-10-CM

## 2017-01-13 DIAGNOSIS — G40.909 EPILEPSY, UNSPECIFIED, NOT INTRACTABLE, WITHOUT STATUS EPILEPTICUS: ICD-10-CM

## 2017-01-13 DIAGNOSIS — A41.9 SEPSIS, UNSPECIFIED ORGANISM: ICD-10-CM

## 2017-01-13 DIAGNOSIS — N18.6 END STAGE RENAL DISEASE: ICD-10-CM

## 2017-01-13 DIAGNOSIS — I50.9 HEART FAILURE, UNSPECIFIED: ICD-10-CM

## 2017-01-13 DIAGNOSIS — F41.9 ANXIETY DISORDER, UNSPECIFIED: ICD-10-CM

## 2017-01-13 DIAGNOSIS — Z95.5 PRESENCE OF CORONARY ANGIOPLASTY IMPLANT AND GRAFT: ICD-10-CM

## 2017-01-13 LAB
CULTURE RESULTS: SIGNIFICANT CHANGE UP
SPECIMEN SOURCE: SIGNIFICANT CHANGE UP

## 2017-04-01 ENCOUNTER — INPATIENT (INPATIENT)
Facility: HOSPITAL | Age: 82
LOS: 9 days | Discharge: HOME HEALTH | End: 2017-04-11
Attending: INTERNAL MEDICINE | Admitting: HOSPITALIST
Payer: MEDICARE

## 2017-04-01 VITALS
RESPIRATION RATE: 20 BRPM | SYSTOLIC BLOOD PRESSURE: 115 MMHG | HEIGHT: 61 IN | HEART RATE: 115 BPM | OXYGEN SATURATION: 95 % | WEIGHT: 121.92 LBS | DIASTOLIC BLOOD PRESSURE: 45 MMHG

## 2017-04-01 DIAGNOSIS — I50.9 HEART FAILURE, UNSPECIFIED: Chronic | ICD-10-CM

## 2017-04-01 DIAGNOSIS — Z98.89 OTHER SPECIFIED POSTPROCEDURAL STATES: Chronic | ICD-10-CM

## 2017-04-01 DIAGNOSIS — E11.9 TYPE 2 DIABETES MELLITUS WITHOUT COMPLICATIONS: Chronic | ICD-10-CM

## 2017-04-01 DIAGNOSIS — Z82.49 FAMILY HISTORY OF ISCHEMIC HEART DISEASE AND OTHER DISEASES OF THE CIRCULATORY SYSTEM: Chronic | ICD-10-CM

## 2017-04-01 PROCEDURE — 99285 EMERGENCY DEPT VISIT HI MDM: CPT | Mod: 25

## 2017-04-01 RX ORDER — SODIUM CHLORIDE 9 MG/ML
1000 INJECTION INTRAMUSCULAR; INTRAVENOUS; SUBCUTANEOUS
Qty: 0 | Refills: 0 | Status: DISCONTINUED | OUTPATIENT
Start: 2017-04-01 | End: 2017-04-02

## 2017-04-01 RX ORDER — SODIUM CHLORIDE 9 MG/ML
1500 INJECTION INTRAMUSCULAR; INTRAVENOUS; SUBCUTANEOUS ONCE
Qty: 0 | Refills: 0 | Status: COMPLETED | OUTPATIENT
Start: 2017-04-01 | End: 2017-04-01

## 2017-04-01 RX ORDER — SODIUM CHLORIDE 9 MG/ML
3 INJECTION INTRAMUSCULAR; INTRAVENOUS; SUBCUTANEOUS ONCE
Qty: 0 | Refills: 0 | Status: COMPLETED | OUTPATIENT
Start: 2017-04-01 | End: 2017-04-01

## 2017-04-01 RX ADMIN — SODIUM CHLORIDE 750 MILLILITER(S): 9 INJECTION INTRAMUSCULAR; INTRAVENOUS; SUBCUTANEOUS at 23:45

## 2017-04-01 RX ADMIN — SODIUM CHLORIDE 3 MILLILITER(S): 9 INJECTION INTRAMUSCULAR; INTRAVENOUS; SUBCUTANEOUS at 23:40

## 2017-04-01 NOTE — ED ADULT TRIAGE NOTE - CHIEF COMPLAINT QUOTE
benjaminis today change mental status , feels warm, fs 446 after 16 units regular insulin and tylenol

## 2017-04-02 DIAGNOSIS — I10 ESSENTIAL (PRIMARY) HYPERTENSION: ICD-10-CM

## 2017-04-02 DIAGNOSIS — R56.9 UNSPECIFIED CONVULSIONS: ICD-10-CM

## 2017-04-02 DIAGNOSIS — E11.22 TYPE 2 DIABETES MELLITUS WITH DIABETIC CHRONIC KIDNEY DISEASE: ICD-10-CM

## 2017-04-02 DIAGNOSIS — E78.5 HYPERLIPIDEMIA, UNSPECIFIED: ICD-10-CM

## 2017-04-02 DIAGNOSIS — R53.1 WEAKNESS: ICD-10-CM

## 2017-04-02 DIAGNOSIS — Z29.9 ENCOUNTER FOR PROPHYLACTIC MEASURES, UNSPECIFIED: ICD-10-CM

## 2017-04-02 DIAGNOSIS — R74.8 ABNORMAL LEVELS OF OTHER SERUM ENZYMES: ICD-10-CM

## 2017-04-02 LAB
ALBUMIN SERPL ELPH-MCNC: 2.6 G/DL — LOW (ref 3.3–5)
ALBUMIN SERPL ELPH-MCNC: 2.6 G/DL — LOW (ref 3.3–5)
ALP SERPL-CCNC: 70 U/L — SIGNIFICANT CHANGE UP (ref 40–120)
ALP SERPL-CCNC: 86 U/L — SIGNIFICANT CHANGE UP (ref 40–120)
ALT FLD-CCNC: 11 U/L — LOW (ref 12–78)
ALT FLD-CCNC: 19 U/L — SIGNIFICANT CHANGE UP (ref 12–78)
ANION GAP SERPL CALC-SCNC: 17 MMOL/L — SIGNIFICANT CHANGE UP (ref 5–17)
ANION GAP SERPL CALC-SCNC: 8 MMOL/L — SIGNIFICANT CHANGE UP (ref 5–17)
ANISOCYTOSIS BLD QL: SLIGHT — SIGNIFICANT CHANGE UP
APPEARANCE UR: CLEAR — SIGNIFICANT CHANGE UP
APTT BLD: 29.7 SEC — SIGNIFICANT CHANGE UP (ref 27.5–37.4)
AST SERPL-CCNC: 15 U/L — SIGNIFICANT CHANGE UP (ref 15–37)
AST SERPL-CCNC: 30 U/L — SIGNIFICANT CHANGE UP (ref 15–37)
BACTERIA # UR AUTO: ABNORMAL
BASE EXCESS BLDA CALC-SCNC: -0.5 MMOL/L — SIGNIFICANT CHANGE UP (ref -2–2)
BASOPHILS # BLD AUTO: 0.1 K/UL — SIGNIFICANT CHANGE UP (ref 0–0.2)
BASOPHILS # BLD AUTO: 0.3 K/UL — HIGH (ref 0–0.2)
BASOPHILS NFR BLD AUTO: 0.7 % — SIGNIFICANT CHANGE UP (ref 0–2)
BASOPHILS NFR BLD AUTO: 1.1 % — SIGNIFICANT CHANGE UP (ref 0–2)
BILIRUB SERPL-MCNC: 0.4 MG/DL — SIGNIFICANT CHANGE UP (ref 0.2–1.2)
BILIRUB SERPL-MCNC: 0.5 MG/DL — SIGNIFICANT CHANGE UP (ref 0.2–1.2)
BILIRUB UR-MCNC: NEGATIVE — SIGNIFICANT CHANGE UP
BLOOD GAS COMMENTS: SIGNIFICANT CHANGE UP
BLOOD GAS SOURCE: SIGNIFICANT CHANGE UP
BUN SERPL-MCNC: 27 MG/DL — HIGH (ref 7–23)
BUN SERPL-MCNC: 33 MG/DL — HIGH (ref 7–23)
CALCIUM SERPL-MCNC: 8.5 MG/DL — SIGNIFICANT CHANGE UP (ref 8.5–10.1)
CALCIUM SERPL-MCNC: 8.9 MG/DL — SIGNIFICANT CHANGE UP (ref 8.5–10.1)
CHLORIDE SERPL-SCNC: 102 MMOL/L — SIGNIFICANT CHANGE UP (ref 96–108)
CHLORIDE SERPL-SCNC: 99 MMOL/L — SIGNIFICANT CHANGE UP (ref 96–108)
CK MB BLD-MCNC: 1.8 % — SIGNIFICANT CHANGE UP (ref 0–3.5)
CK MB BLD-MCNC: 2.8 % — SIGNIFICANT CHANGE UP (ref 0–3.5)
CK MB BLD-MCNC: 4.1 % — HIGH (ref 0–3.5)
CK MB CFR SERPL CALC: 1.3 NG/ML — SIGNIFICANT CHANGE UP (ref 0.5–3.6)
CK MB CFR SERPL CALC: 3.1 NG/ML — SIGNIFICANT CHANGE UP (ref 0.5–3.6)
CK MB CFR SERPL CALC: 5.3 NG/ML — HIGH (ref 0.5–3.6)
CK SERPL-CCNC: 112 U/L — SIGNIFICANT CHANGE UP (ref 26–192)
CK SERPL-CCNC: 129 U/L — SIGNIFICANT CHANGE UP (ref 26–192)
CK SERPL-CCNC: 71 U/L — SIGNIFICANT CHANGE UP (ref 26–192)
CO2 SERPL-SCNC: 19 MMOL/L — LOW (ref 22–31)
CO2 SERPL-SCNC: 32 MMOL/L — HIGH (ref 22–31)
COLOR SPEC: YELLOW — SIGNIFICANT CHANGE UP
CREAT SERPL-MCNC: 3.88 MG/DL — HIGH (ref 0.5–1.3)
CREAT SERPL-MCNC: 4.82 MG/DL — HIGH (ref 0.5–1.3)
DACRYOCYTES BLD QL SMEAR: SLIGHT — SIGNIFICANT CHANGE UP
DIFF PNL FLD: NEGATIVE — SIGNIFICANT CHANGE UP
ELLIPTOCYTES BLD QL SMEAR: SLIGHT — SIGNIFICANT CHANGE UP
EOSINOPHIL # BLD AUTO: 0 K/UL — SIGNIFICANT CHANGE UP (ref 0–0.5)
EOSINOPHIL # BLD AUTO: 1.9 K/UL — HIGH (ref 0–0.5)
EOSINOPHIL NFR BLD AUTO: 0.3 % — SIGNIFICANT CHANGE UP (ref 0–6)
EOSINOPHIL NFR BLD AUTO: 7.7 % — HIGH (ref 0–6)
EPI CELLS # UR: SIGNIFICANT CHANGE UP
FLUAV SPEC QL CULT: NEGATIVE — SIGNIFICANT CHANGE UP
FLUBV AG SPEC QL IA: NEGATIVE — SIGNIFICANT CHANGE UP
GLUCOSE SERPL-MCNC: 161 MG/DL — HIGH (ref 70–99)
GLUCOSE SERPL-MCNC: 411 MG/DL — HIGH (ref 70–99)
GLUCOSE UR QL: 250 MG/DL
HCO3 BLDA-SCNC: 24 MMOL/L — SIGNIFICANT CHANGE UP (ref 21–29)
HCT VFR BLD CALC: 27.3 % — LOW (ref 34.5–45)
HCT VFR BLD CALC: 28.5 % — LOW (ref 34.5–45)
HGB BLD-MCNC: 9 G/DL — LOW (ref 11.5–15.5)
HGB BLD-MCNC: 9.2 G/DL — LOW (ref 11.5–15.5)
HOROWITZ INDEX BLDA+IHG-RTO: 1 — SIGNIFICANT CHANGE UP
HYPOCHROMIA BLD QL: SLIGHT — SIGNIFICANT CHANGE UP
INR BLD: 1.04 RATIO — SIGNIFICANT CHANGE UP (ref 0.88–1.16)
KETONES UR-MCNC: NEGATIVE — SIGNIFICANT CHANGE UP
LACTATE SERPL-SCNC: 1.3 MMOL/L — SIGNIFICANT CHANGE UP (ref 0.7–2)
LACTATE SERPL-SCNC: 2.7 MMOL/L — HIGH (ref 0.7–2)
LACTATE SERPL-SCNC: 3.8 MMOL/L — HIGH (ref 0.7–2)
LEUKOCYTE ESTERASE UR-ACNC: NEGATIVE — SIGNIFICANT CHANGE UP
LYMPHOCYTES # BLD AUTO: 0.9 K/UL — LOW (ref 1–3.3)
LYMPHOCYTES # BLD AUTO: 19.4 % — SIGNIFICANT CHANGE UP (ref 13–44)
LYMPHOCYTES # BLD AUTO: 4.9 K/UL — HIGH (ref 1–3.3)
LYMPHOCYTES # BLD AUTO: 5.3 % — LOW (ref 13–44)
MACROCYTES BLD QL: SLIGHT — SIGNIFICANT CHANGE UP
MAGNESIUM SERPL-MCNC: 2.7 MG/DL — HIGH (ref 1.8–2.4)
MCHC RBC-ENTMCNC: 28.7 PG — SIGNIFICANT CHANGE UP (ref 27–34)
MCHC RBC-ENTMCNC: 28.8 PG — SIGNIFICANT CHANGE UP (ref 27–34)
MCHC RBC-ENTMCNC: 32.3 GM/DL — SIGNIFICANT CHANGE UP (ref 32–36)
MCHC RBC-ENTMCNC: 32.9 GM/DL — SIGNIFICANT CHANGE UP (ref 32–36)
MCV RBC AUTO: 87.2 FL — SIGNIFICANT CHANGE UP (ref 80–100)
MCV RBC AUTO: 89.3 FL — SIGNIFICANT CHANGE UP (ref 80–100)
MICROCYTES BLD QL: SLIGHT — SIGNIFICANT CHANGE UP
MONOCYTES # BLD AUTO: 1.1 K/UL — HIGH (ref 0–0.9)
MONOCYTES # BLD AUTO: 1.6 K/UL — HIGH (ref 0–0.9)
MONOCYTES NFR BLD AUTO: 6.4 % — SIGNIFICANT CHANGE UP (ref 2–14)
MONOCYTES NFR BLD AUTO: 6.4 % — SIGNIFICANT CHANGE UP (ref 2–14)
NEUTROPHILS # BLD AUTO: 15.6 K/UL — HIGH (ref 1.8–7.4)
NEUTROPHILS # BLD AUTO: 16.5 K/UL — HIGH (ref 1.8–7.4)
NEUTROPHILS NFR BLD AUTO: 65.3 % — SIGNIFICANT CHANGE UP (ref 43–77)
NEUTROPHILS NFR BLD AUTO: 87.4 % — HIGH (ref 43–77)
NITRITE UR-MCNC: NEGATIVE — SIGNIFICANT CHANGE UP
NT-PROBNP SERPL-SCNC: HIGH PG/ML (ref 0–450)
PCO2 BLDA: 42 MMHG — SIGNIFICANT CHANGE UP (ref 32–46)
PH BLD: 7.37 — SIGNIFICANT CHANGE UP (ref 7.35–7.45)
PH UR: 9 — HIGH (ref 4.8–8)
PLAT MORPH BLD: NORMAL — SIGNIFICANT CHANGE UP
PLATELET # BLD AUTO: 254 K/UL — SIGNIFICANT CHANGE UP (ref 150–400)
PLATELET # BLD AUTO: 277 K/UL — SIGNIFICANT CHANGE UP (ref 150–400)
PO2 BLDA: 271 MMHG — HIGH (ref 74–108)
POLYCHROMASIA BLD QL SMEAR: SLIGHT — SIGNIFICANT CHANGE UP
POTASSIUM SERPL-MCNC: 3.8 MMOL/L — SIGNIFICANT CHANGE UP (ref 3.5–5.3)
POTASSIUM SERPL-MCNC: 4.3 MMOL/L — SIGNIFICANT CHANGE UP (ref 3.5–5.3)
POTASSIUM SERPL-SCNC: 3.8 MMOL/L — SIGNIFICANT CHANGE UP (ref 3.5–5.3)
POTASSIUM SERPL-SCNC: 4.3 MMOL/L — SIGNIFICANT CHANGE UP (ref 3.5–5.3)
PROT SERPL-MCNC: 6.5 GM/DL — SIGNIFICANT CHANGE UP (ref 6–8.3)
PROT SERPL-MCNC: 7.1 GM/DL — SIGNIFICANT CHANGE UP (ref 6–8.3)
PROT UR-MCNC: 100 MG/DL
PROTHROM AB SERPL-ACNC: 11.4 SEC — SIGNIFICANT CHANGE UP (ref 9.8–12.7)
RBC # BLD: 3.13 M/UL — LOW (ref 3.8–5.2)
RBC # BLD: 3.19 M/UL — LOW (ref 3.8–5.2)
RBC # FLD: 19.2 % — HIGH (ref 11–15)
RBC # FLD: 20.2 % — HIGH (ref 11–15)
RBC BLD AUTO: ABNORMAL
RBC CASTS # UR COMP ASSIST: SIGNIFICANT CHANGE UP /HPF (ref 0–4)
SAO2 % BLDA: 99 % — HIGH (ref 92–96)
SODIUM SERPL-SCNC: 138 MMOL/L — SIGNIFICANT CHANGE UP (ref 135–145)
SODIUM SERPL-SCNC: 139 MMOL/L — SIGNIFICANT CHANGE UP (ref 135–145)
SP GR SPEC: 1.01 — SIGNIFICANT CHANGE UP (ref 1.01–1.02)
TARGETS BLD QL SMEAR: SLIGHT — SIGNIFICANT CHANGE UP
TROPONIN I SERPL-MCNC: 0.07 NG/ML — HIGH (ref 0.01–0.04)
TROPONIN I SERPL-MCNC: 0.3 NG/ML — HIGH (ref 0.01–0.04)
TROPONIN I SERPL-MCNC: 1.13 NG/ML — HIGH (ref 0.01–0.04)
TSH SERPL-MCNC: <0.005 UU/ML — LOW (ref 0.36–3.74)
UROBILINOGEN FLD QL: NEGATIVE MG/DL — SIGNIFICANT CHANGE UP
WBC # BLD: 17.8 K/UL — HIGH (ref 3.8–10.5)
WBC # BLD: 25.3 K/UL — HIGH (ref 3.8–10.5)
WBC # FLD AUTO: 17.8 K/UL — HIGH (ref 3.8–10.5)
WBC # FLD AUTO: 25.3 K/UL — HIGH (ref 3.8–10.5)
WBC UR QL: SIGNIFICANT CHANGE UP

## 2017-04-02 PROCEDURE — 71010: CPT | Mod: 26

## 2017-04-02 PROCEDURE — 99223 1ST HOSP IP/OBS HIGH 75: CPT | Mod: AI

## 2017-04-02 PROCEDURE — 70450 CT HEAD/BRAIN W/O DYE: CPT | Mod: 26

## 2017-04-02 RX ORDER — CLOPIDOGREL BISULFATE 75 MG/1
75 TABLET, FILM COATED ORAL DAILY
Qty: 0 | Refills: 0 | Status: DISCONTINUED | OUTPATIENT
Start: 2017-04-02 | End: 2017-04-11

## 2017-04-02 RX ORDER — ATORVASTATIN CALCIUM 80 MG/1
40 TABLET, FILM COATED ORAL AT BEDTIME
Qty: 0 | Refills: 0 | Status: DISCONTINUED | OUTPATIENT
Start: 2017-04-02 | End: 2017-04-11

## 2017-04-02 RX ORDER — IPRATROPIUM BROMIDE 0.2 MG/ML
500 SOLUTION, NON-ORAL INHALATION ONCE
Qty: 0 | Refills: 0 | Status: COMPLETED | OUTPATIENT
Start: 2017-04-02 | End: 2017-04-02

## 2017-04-02 RX ORDER — DEXTROSE 50 % IN WATER 50 %
1 SYRINGE (ML) INTRAVENOUS ONCE
Qty: 0 | Refills: 0 | Status: DISCONTINUED | OUTPATIENT
Start: 2017-04-02 | End: 2017-04-11

## 2017-04-02 RX ORDER — INSULIN HUMAN 100 [IU]/ML
10 INJECTION, SOLUTION SUBCUTANEOUS ONCE
Qty: 0 | Refills: 0 | Status: COMPLETED | OUTPATIENT
Start: 2017-04-02 | End: 2017-04-02

## 2017-04-02 RX ORDER — IBUPROFEN 200 MG
600 TABLET ORAL ONCE
Qty: 0 | Refills: 0 | Status: COMPLETED | OUTPATIENT
Start: 2017-04-02 | End: 2017-04-02

## 2017-04-02 RX ORDER — LEVOTHYROXINE SODIUM 125 MCG
50 TABLET ORAL DAILY
Qty: 0 | Refills: 0 | Status: DISCONTINUED | OUTPATIENT
Start: 2017-04-02 | End: 2017-04-02

## 2017-04-02 RX ORDER — METOPROLOL TARTRATE 50 MG
5 TABLET ORAL ONCE
Qty: 0 | Refills: 0 | Status: COMPLETED | OUTPATIENT
Start: 2017-04-02 | End: 2017-04-02

## 2017-04-02 RX ORDER — VANCOMYCIN HCL 1 G
1000 VIAL (EA) INTRAVENOUS ONCE
Qty: 0 | Refills: 0 | Status: COMPLETED | OUTPATIENT
Start: 2017-04-02 | End: 2017-04-02

## 2017-04-02 RX ORDER — INSULIN GLARGINE 100 [IU]/ML
6 INJECTION, SOLUTION SUBCUTANEOUS AT BEDTIME
Qty: 0 | Refills: 0 | Status: DISCONTINUED | OUTPATIENT
Start: 2017-04-02 | End: 2017-04-09

## 2017-04-02 RX ORDER — DEXTROSE 50 % IN WATER 50 %
25 SYRINGE (ML) INTRAVENOUS ONCE
Qty: 0 | Refills: 0 | Status: DISCONTINUED | OUTPATIENT
Start: 2017-04-02 | End: 2017-04-11

## 2017-04-02 RX ORDER — NITROGLYCERIN 6.5 MG
0.5 CAPSULE, EXTENDED RELEASE ORAL ONCE
Qty: 0 | Refills: 0 | Status: COMPLETED | OUTPATIENT
Start: 2017-04-02 | End: 2017-04-02

## 2017-04-02 RX ORDER — ASPIRIN/CALCIUM CARB/MAGNESIUM 324 MG
325 TABLET ORAL ONCE
Qty: 0 | Refills: 0 | Status: COMPLETED | OUTPATIENT
Start: 2017-04-02 | End: 2017-04-02

## 2017-04-02 RX ORDER — FUROSEMIDE 40 MG
40 TABLET ORAL ONCE
Qty: 0 | Refills: 0 | Status: COMPLETED | OUTPATIENT
Start: 2017-04-02 | End: 2017-04-02

## 2017-04-02 RX ORDER — ASPIRIN/CALCIUM CARB/MAGNESIUM 324 MG
81 TABLET ORAL DAILY
Qty: 0 | Refills: 0 | Status: DISCONTINUED | OUTPATIENT
Start: 2017-04-02 | End: 2017-04-03

## 2017-04-02 RX ORDER — TRAMADOL HYDROCHLORIDE 50 MG/1
50 TABLET ORAL ONCE
Qty: 0 | Refills: 0 | Status: DISCONTINUED | OUTPATIENT
Start: 2017-04-02 | End: 2017-04-02

## 2017-04-02 RX ORDER — PIPERACILLIN AND TAZOBACTAM 4; .5 G/20ML; G/20ML
3.38 INJECTION, POWDER, LYOPHILIZED, FOR SOLUTION INTRAVENOUS ONCE
Qty: 0 | Refills: 0 | Status: COMPLETED | OUTPATIENT
Start: 2017-04-02 | End: 2017-04-02

## 2017-04-02 RX ORDER — DEXTROSE 50 % IN WATER 50 %
12.5 SYRINGE (ML) INTRAVENOUS ONCE
Qty: 0 | Refills: 0 | Status: DISCONTINUED | OUTPATIENT
Start: 2017-04-02 | End: 2017-04-11

## 2017-04-02 RX ORDER — INSULIN LISPRO 100/ML
VIAL (ML) SUBCUTANEOUS
Qty: 0 | Refills: 0 | Status: DISCONTINUED | OUTPATIENT
Start: 2017-04-02 | End: 2017-04-03

## 2017-04-02 RX ORDER — SODIUM CHLORIDE 9 MG/ML
1000 INJECTION, SOLUTION INTRAVENOUS
Qty: 0 | Refills: 0 | Status: DISCONTINUED | OUTPATIENT
Start: 2017-04-02 | End: 2017-04-11

## 2017-04-02 RX ORDER — PIPERACILLIN AND TAZOBACTAM 4; .5 G/20ML; G/20ML
3.38 INJECTION, POWDER, LYOPHILIZED, FOR SOLUTION INTRAVENOUS EVERY 12 HOURS
Qty: 0 | Refills: 0 | Status: DISCONTINUED | OUTPATIENT
Start: 2017-04-02 | End: 2017-04-06

## 2017-04-02 RX ORDER — AMLODIPINE BESYLATE 2.5 MG/1
10 TABLET ORAL DAILY
Qty: 0 | Refills: 0 | Status: DISCONTINUED | OUTPATIENT
Start: 2017-04-02 | End: 2017-04-03

## 2017-04-02 RX ORDER — GLUCAGON INJECTION, SOLUTION 0.5 MG/.1ML
1 INJECTION, SOLUTION SUBCUTANEOUS ONCE
Qty: 0 | Refills: 0 | Status: DISCONTINUED | OUTPATIENT
Start: 2017-04-02 | End: 2017-04-04

## 2017-04-02 RX ADMIN — Medication 325 MILLIGRAM(S): at 06:42

## 2017-04-02 RX ADMIN — Medication 500 MICROGRAM(S): at 22:29

## 2017-04-02 RX ADMIN — SODIUM CHLORIDE 75 MILLILITER(S): 9 INJECTION INTRAMUSCULAR; INTRAVENOUS; SUBCUTANEOUS at 02:02

## 2017-04-02 RX ADMIN — CLOPIDOGREL BISULFATE 75 MILLIGRAM(S): 75 TABLET, FILM COATED ORAL at 12:45

## 2017-04-02 RX ADMIN — TRAMADOL HYDROCHLORIDE 50 MILLIGRAM(S): 50 TABLET ORAL at 02:45

## 2017-04-02 RX ADMIN — TRAMADOL HYDROCHLORIDE 50 MILLIGRAM(S): 50 TABLET ORAL at 02:06

## 2017-04-02 RX ADMIN — Medication 50 MICROGRAM(S): at 06:42

## 2017-04-02 RX ADMIN — Medication 2: at 08:10

## 2017-04-02 RX ADMIN — Medication 600 MILLIGRAM(S): at 00:36

## 2017-04-02 RX ADMIN — PIPERACILLIN AND TAZOBACTAM 200 GRAM(S): 4; .5 INJECTION, POWDER, LYOPHILIZED, FOR SOLUTION INTRAVENOUS at 02:30

## 2017-04-02 RX ADMIN — Medication 500 MICROGRAM(S): at 22:50

## 2017-04-02 RX ADMIN — Medication 81 MILLIGRAM(S): at 12:45

## 2017-04-02 RX ADMIN — Medication 0.5 INCH(S): at 22:35

## 2017-04-02 RX ADMIN — Medication 600 MILLIGRAM(S): at 01:32

## 2017-04-02 RX ADMIN — PIPERACILLIN AND TAZOBACTAM 25 GRAM(S): 4; .5 INJECTION, POWDER, LYOPHILIZED, FOR SOLUTION INTRAVENOUS at 08:11

## 2017-04-02 RX ADMIN — Medication 40 MILLIGRAM(S): at 22:23

## 2017-04-02 RX ADMIN — PIPERACILLIN AND TAZOBACTAM 25 GRAM(S): 4; .5 INJECTION, POWDER, LYOPHILIZED, FOR SOLUTION INTRAVENOUS at 17:10

## 2017-04-02 RX ADMIN — Medication 250 MILLIGRAM(S): at 06:42

## 2017-04-02 RX ADMIN — INSULIN HUMAN 10 UNIT(S): 100 INJECTION, SOLUTION SUBCUTANEOUS at 00:36

## 2017-04-02 NOTE — H&P ADULT. - PMH
Acute Renal Insufficiency    CHF (Congestive Heart Failure)    Diabetes Mellitus    Dyslipidemia    H/O: Hypothyroidism    Hypertension    LBBB (left bundle branch block)    Seizure

## 2017-04-02 NOTE — CHART NOTE - NSCHARTNOTEFT_GEN_A_CORE
Follow-up to H&P dated April 2,2017. Continue Zosyn. Hold Synthroid as TSH suppressed. Free T-4 in AM. Continue current plan. Follow-up to H&P dated April 2,2017. H&P, orders reviewed in full. VSS. Continue Zosyn. Hold Synthroid as TSH suppressed. Free T-4 in AM. Continue current plan. Follow-up to H&P dated April 2,2017. H&P, orders reviewed in full. VSS. Continue Zosyn for elevated WBC. CXR and UA appear to have no infections. Lactate normal. Possible stop Zosyn in AM. . Hold Synthroid as TSH suppressed. Free T-4 in AM. Continue current plan and HD per renal.

## 2017-04-02 NOTE — H&P ADULT. - PROBLEM SELECTOR PLAN 1
admit to tele  blood cultures  iv hydration  cont zosyn as elevated wbc, and lactate  single dose of vanco

## 2017-04-02 NOTE — GOALS OF CARE CONVERSATION - PERSONAL ADVANCE DIRECTIVE - CONVERSATION DETAILS
Discussed with family pt's condition of respiratory distress despite non invasive measures such as BiPAP. Family states pt is full code. Agrees to intubation if absolutely necessary after exhausting all other options.

## 2017-04-02 NOTE — H&P ADULT. - HISTORY OF PRESENT ILLNESS
Pt. is a 87 y/o female w/pmhx of HTN, DM-2 (on lantus 16u qhs), Seizure d/o (not taking keppra), ESRD on HD m,w,f was in [facility] till friday while in HD pt pulled catheter and later was told there was fistula clot, went to vascular yesterday andafte had full HD, son states after HD pt felt weak. states she was just not herself and brings her in, no seizure like activity noted, no cp, palitations n/v/d/c no fever or chills.

## 2017-04-02 NOTE — ED ADULT NURSE REASSESSMENT NOTE - NS ED NURSE REASSESS COMMENT FT1
Patient tolerated half of breakfast provided, sitting up in bed, very awake and orientated to person and place.

## 2017-04-02 NOTE — ED PROVIDER NOTE - MEDICAL DECISION MAKING DETAILS
pt presented today brought in with her daughter for weakness and confusion, found to be febrile, wbc elevated and lactate also elevated, pt admitted for ivf, cultures and antibiotics

## 2017-04-02 NOTE — H&P ADULT. - ASSESSMENT
87 y/o female w/pmhx of HTN, DM-2 Seizure d/o (not taking keppra), ESRD on HD w/weakness after hd, has elevated lactate and elevated troponin ?from clearance

## 2017-04-02 NOTE — H&P ADULT. - NEUROLOGICAL DETAILS
responds to pain/deep reflexes intact/cranial nerves intact/responds to verbal commands/sensation intact/alert and oriented x 3/strength decreased

## 2017-04-02 NOTE — ED PROVIDER NOTE - OBJECTIVE STATEMENT
86 year old female HTN, DM, acute renal insufficiency, seizure and CHF presents today accompanied with her daughter who c/o her mother being weak and confused today + dry cough +vomited x 1 during dialysis (which is normal for her) (-) abdominal pain  (-) chest pain (-) sob (-) palpitations

## 2017-04-02 NOTE — ED PROVIDER NOTE - PMH
Acute Renal Insufficiency    CHF (Congestive Heart Failure)    Diabetes Mellitus    Dyslipidemia    H/O: Hypothyroidism    Hypertension    Seizure Acute Renal Insufficiency    CHF (Congestive Heart Failure)    Diabetes Mellitus    Dyslipidemia    H/O: Hypothyroidism    Hypertension    LBBB (left bundle branch block)    Seizure

## 2017-04-02 NOTE — ED ADULT NURSE NOTE - OBJECTIVE STATEMENT
66 year old female HTN, DM, acute renal insufficiency, seizure, CHF, dialyisis (M/W/F)  presents today accompanied with her daughter who c/o her mother being new onset of confused today and general weakness. Pt tachycardia and febrile at triage. sepsis protocol initiated.

## 2017-04-02 NOTE — H&P ADULT. - PROBLEM SELECTOR PROBLEM 5
Type 2 diabetes mellitus with chronic kidney disease on chronic dialysis, with long-term current use of insulin

## 2017-04-03 LAB
ANION GAP SERPL CALC-SCNC: 11 MMOL/L — SIGNIFICANT CHANGE UP (ref 5–17)
APTT BLD: 34.4 SEC — SIGNIFICANT CHANGE UP (ref 27.5–37.4)
BASE EXCESS BLDA CALC-SCNC: 3.6 MMOL/L — HIGH (ref -2–2)
BLOOD GAS COMMENTS: SIGNIFICANT CHANGE UP
BLOOD GAS COMMENTS: SIGNIFICANT CHANGE UP
BLOOD GAS SOURCE: SIGNIFICANT CHANGE UP
BUN SERPL-MCNC: 38 MG/DL — HIGH (ref 7–23)
CALCIUM SERPL-MCNC: 8.9 MG/DL — SIGNIFICANT CHANGE UP (ref 8.5–10.1)
CHLORIDE SERPL-SCNC: 102 MMOL/L — SIGNIFICANT CHANGE UP (ref 96–108)
CK MB BLD-MCNC: 6.3 % — HIGH (ref 0–3.5)
CK MB BLD-MCNC: 6.8 % — HIGH (ref 0–3.5)
CK MB CFR SERPL CALC: 5.7 NG/ML — HIGH (ref 0.5–3.6)
CK MB CFR SERPL CALC: 8.2 NG/ML — HIGH (ref 0.5–3.6)
CK SERPL-CCNC: 121 U/L — SIGNIFICANT CHANGE UP (ref 26–192)
CK SERPL-CCNC: 90 U/L — SIGNIFICANT CHANGE UP (ref 26–192)
CO2 SERPL-SCNC: 26 MMOL/L — SIGNIFICANT CHANGE UP (ref 22–31)
CREAT SERPL-MCNC: 5.22 MG/DL — HIGH (ref 0.5–1.3)
CULTURE RESULTS: NO GROWTH — SIGNIFICANT CHANGE UP
GLUCOSE SERPL-MCNC: 134 MG/DL — HIGH (ref 70–99)
GRAM STN FLD: SIGNIFICANT CHANGE UP
HBA1C BLD-MCNC: 7.2 % — HIGH (ref 4–5.6)
HCO3 BLDA-SCNC: 26 MMOL/L — SIGNIFICANT CHANGE UP (ref 21–29)
HCT VFR BLD CALC: 27.6 % — LOW (ref 34.5–45)
HGB BLD-MCNC: 8.9 G/DL — LOW (ref 11.5–15.5)
HOROWITZ INDEX BLDA+IHG-RTO: 50 — SIGNIFICANT CHANGE UP
MCHC RBC-ENTMCNC: 28.8 PG — SIGNIFICANT CHANGE UP (ref 27–34)
MCHC RBC-ENTMCNC: 32.3 GM/DL — SIGNIFICANT CHANGE UP (ref 32–36)
MCV RBC AUTO: 89.1 FL — SIGNIFICANT CHANGE UP (ref 80–100)
PCO2 BLDA: 33 MMHG — SIGNIFICANT CHANGE UP (ref 32–46)
PH BLD: 7.51 — HIGH (ref 7.35–7.45)
PHOSPHATE SERPL-MCNC: 4.4 MG/DL — SIGNIFICANT CHANGE UP (ref 2.5–4.5)
PLATELET # BLD AUTO: 249 K/UL — SIGNIFICANT CHANGE UP (ref 150–400)
PO2 BLDA: 178 MMHG — HIGH (ref 74–108)
POTASSIUM SERPL-MCNC: 4.9 MMOL/L — SIGNIFICANT CHANGE UP (ref 3.5–5.3)
POTASSIUM SERPL-SCNC: 4.9 MMOL/L — SIGNIFICANT CHANGE UP (ref 3.5–5.3)
RBC # BLD: 3.09 M/UL — LOW (ref 3.8–5.2)
RBC # FLD: 20.6 % — HIGH (ref 11–15)
SAO2 % BLDA: 99 % — HIGH (ref 92–96)
SODIUM SERPL-SCNC: 139 MMOL/L — SIGNIFICANT CHANGE UP (ref 135–145)
SPECIMEN SOURCE: SIGNIFICANT CHANGE UP
SPECIMEN SOURCE: SIGNIFICANT CHANGE UP
T4 FREE SERPL-MCNC: 1.6 NG/DL — SIGNIFICANT CHANGE UP (ref 0.9–1.8)
TROPONIN I SERPL-MCNC: 3.64 NG/ML — HIGH (ref 0.01–0.04)
TROPONIN I SERPL-MCNC: 5.19 NG/ML — HIGH (ref 0.01–0.04)
VANCOMYCIN TROUGH SERPL-MCNC: 15.1 UG/ML — SIGNIFICANT CHANGE UP (ref 10–20)
WBC # BLD: 19.8 K/UL — HIGH (ref 3.8–10.5)
WBC # FLD AUTO: 19.8 K/UL — HIGH (ref 3.8–10.5)

## 2017-04-03 PROCEDURE — 99291 CRITICAL CARE FIRST HOUR: CPT | Mod: 25

## 2017-04-03 PROCEDURE — 71010: CPT | Mod: 26

## 2017-04-03 PROCEDURE — 36600 WITHDRAWAL OF ARTERIAL BLOOD: CPT | Mod: 59

## 2017-04-03 PROCEDURE — 99292 CRITICAL CARE ADDL 30 MIN: CPT | Mod: 25

## 2017-04-03 PROCEDURE — 31500 INSERT EMERGENCY AIRWAY: CPT

## 2017-04-03 PROCEDURE — 99291 CRITICAL CARE FIRST HOUR: CPT

## 2017-04-03 RX ORDER — CHLORHEXIDINE GLUCONATE 213 G/1000ML
1 SOLUTION TOPICAL DAILY
Qty: 0 | Refills: 0 | Status: DISCONTINUED | OUTPATIENT
Start: 2017-04-03 | End: 2017-04-04

## 2017-04-03 RX ORDER — PANTOPRAZOLE SODIUM 20 MG/1
40 TABLET, DELAYED RELEASE ORAL DAILY
Qty: 0 | Refills: 0 | Status: DISCONTINUED | OUTPATIENT
Start: 2017-04-03 | End: 2017-04-04

## 2017-04-03 RX ORDER — PROPOFOL 10 MG/ML
10 INJECTION, EMULSION INTRAVENOUS
Qty: 1000 | Refills: 0 | Status: DISCONTINUED | OUTPATIENT
Start: 2017-04-03 | End: 2017-04-04

## 2017-04-03 RX ORDER — HEPARIN SODIUM 5000 [USP'U]/ML
3500 INJECTION INTRAVENOUS; SUBCUTANEOUS ONCE
Qty: 0 | Refills: 0 | Status: COMPLETED | OUTPATIENT
Start: 2017-04-03 | End: 2017-04-03

## 2017-04-03 RX ORDER — DEXMEDETOMIDINE HYDROCHLORIDE IN 0.9% SODIUM CHLORIDE 4 UG/ML
0.2 INJECTION INTRAVENOUS
Qty: 200 | Refills: 0 | Status: DISCONTINUED | OUTPATIENT
Start: 2017-04-03 | End: 2017-04-04

## 2017-04-03 RX ORDER — HEPARIN SODIUM 5000 [USP'U]/ML
5000 INJECTION INTRAVENOUS; SUBCUTANEOUS EVERY 12 HOURS
Qty: 0 | Refills: 0 | Status: DISCONTINUED | OUTPATIENT
Start: 2017-04-03 | End: 2017-04-03

## 2017-04-03 RX ORDER — INSULIN LISPRO 100/ML
VIAL (ML) SUBCUTANEOUS EVERY 6 HOURS
Qty: 0 | Refills: 0 | Status: DISCONTINUED | OUTPATIENT
Start: 2017-04-03 | End: 2017-04-05

## 2017-04-03 RX ORDER — ASPIRIN/CALCIUM CARB/MAGNESIUM 324 MG
81 TABLET ORAL DAILY
Qty: 0 | Refills: 0 | Status: DISCONTINUED | OUTPATIENT
Start: 2017-04-03 | End: 2017-04-11

## 2017-04-03 RX ORDER — HEPARIN SODIUM 5000 [USP'U]/ML
3500 INJECTION INTRAVENOUS; SUBCUTANEOUS EVERY 6 HOURS
Qty: 0 | Refills: 0 | Status: DISCONTINUED | OUTPATIENT
Start: 2017-04-03 | End: 2017-04-05

## 2017-04-03 RX ORDER — CHLORHEXIDINE GLUCONATE 213 G/1000ML
15 SOLUTION TOPICAL
Qty: 0 | Refills: 0 | Status: DISCONTINUED | OUTPATIENT
Start: 2017-04-03 | End: 2017-04-04

## 2017-04-03 RX ORDER — SODIUM CHLORIDE 9 MG/ML
250 INJECTION INTRAMUSCULAR; INTRAVENOUS; SUBCUTANEOUS ONCE
Qty: 0 | Refills: 0 | Status: COMPLETED | OUTPATIENT
Start: 2017-04-03 | End: 2017-04-03

## 2017-04-03 RX ORDER — PROPOFOL 10 MG/ML
30 INJECTION, EMULSION INTRAVENOUS ONCE
Qty: 0 | Refills: 0 | Status: COMPLETED | OUTPATIENT
Start: 2017-04-03 | End: 2017-04-03

## 2017-04-03 RX ORDER — PHENYLEPHRINE HYDROCHLORIDE 10 MG/ML
0.1 INJECTION INTRAVENOUS
Qty: 80 | Refills: 0 | Status: DISCONTINUED | OUTPATIENT
Start: 2017-04-03 | End: 2017-04-03

## 2017-04-03 RX ORDER — PHENYLEPHRINE HYDROCHLORIDE 10 MG/ML
0.1 INJECTION INTRAVENOUS
Qty: 80 | Refills: 0 | Status: DISCONTINUED | OUTPATIENT
Start: 2017-04-03 | End: 2017-04-04

## 2017-04-03 RX ORDER — PROPOFOL 10 MG/ML
5 INJECTION, EMULSION INTRAVENOUS ONCE
Qty: 0 | Refills: 0 | Status: COMPLETED | OUTPATIENT
Start: 2017-04-03 | End: 2017-04-03

## 2017-04-03 RX ORDER — HEPARIN SODIUM 5000 [USP'U]/ML
INJECTION INTRAVENOUS; SUBCUTANEOUS
Qty: 25000 | Refills: 0 | Status: DISCONTINUED | OUTPATIENT
Start: 2017-04-03 | End: 2017-04-05

## 2017-04-03 RX ADMIN — HEPARIN SODIUM 3500 UNIT(S): 5000 INJECTION INTRAVENOUS; SUBCUTANEOUS at 18:37

## 2017-04-03 RX ADMIN — PIPERACILLIN AND TAZOBACTAM 25 GRAM(S): 4; .5 INJECTION, POWDER, LYOPHILIZED, FOR SOLUTION INTRAVENOUS at 17:45

## 2017-04-03 RX ADMIN — Medication 2: at 23:10

## 2017-04-03 RX ADMIN — CHLORHEXIDINE GLUCONATE 15 MILLILITER(S): 213 SOLUTION TOPICAL at 17:45

## 2017-04-03 RX ADMIN — Medication 2: at 06:02

## 2017-04-03 RX ADMIN — PANTOPRAZOLE SODIUM 40 MILLIGRAM(S): 20 TABLET, DELAYED RELEASE ORAL at 11:53

## 2017-04-03 RX ADMIN — CLOPIDOGREL BISULFATE 75 MILLIGRAM(S): 75 TABLET, FILM COATED ORAL at 13:05

## 2017-04-03 RX ADMIN — SODIUM CHLORIDE 500 MILLILITER(S): 9 INJECTION INTRAMUSCULAR; INTRAVENOUS; SUBCUTANEOUS at 08:15

## 2017-04-03 RX ADMIN — HEPARIN SODIUM 3500 UNIT(S): 5000 INJECTION INTRAVENOUS; SUBCUTANEOUS at 11:40

## 2017-04-03 RX ADMIN — PROPOFOL 5 MILLIGRAM(S): 10 INJECTION, EMULSION INTRAVENOUS at 01:37

## 2017-04-03 RX ADMIN — Medication 81 MILLIGRAM(S): at 11:53

## 2017-04-03 RX ADMIN — HEPARIN SODIUM 5000 UNIT(S): 5000 INJECTION INTRAVENOUS; SUBCUTANEOUS at 06:02

## 2017-04-03 RX ADMIN — DEXMEDETOMIDINE HYDROCHLORIDE IN 0.9% SODIUM CHLORIDE 2.96 MICROGRAM(S)/KG/HR: 4 INJECTION INTRAVENOUS at 01:02

## 2017-04-03 RX ADMIN — HEPARIN SODIUM 700 UNIT(S)/HR: 5000 INJECTION INTRAVENOUS; SUBCUTANEOUS at 11:25

## 2017-04-03 RX ADMIN — PROPOFOL 3.55 MICROGRAM(S)/KG/MIN: 10 INJECTION, EMULSION INTRAVENOUS at 18:36

## 2017-04-03 RX ADMIN — PHENYLEPHRINE HYDROCHLORIDE 2.22 MICROGRAM(S)/KG/MIN: 10 INJECTION INTRAVENOUS at 01:01

## 2017-04-03 RX ADMIN — PROPOFOL 30 MILLIGRAM(S): 10 INJECTION, EMULSION INTRAVENOUS at 00:38

## 2017-04-03 RX ADMIN — PROPOFOL 3.55 MICROGRAM(S)/KG/MIN: 10 INJECTION, EMULSION INTRAVENOUS at 01:00

## 2017-04-03 RX ADMIN — ATORVASTATIN CALCIUM 40 MILLIGRAM(S): 80 TABLET, FILM COATED ORAL at 21:43

## 2017-04-03 RX ADMIN — CHLORHEXIDINE GLUCONATE 1 APPLICATION(S): 213 SOLUTION TOPICAL at 11:48

## 2017-04-03 RX ADMIN — Medication 0.5 INCH(S): at 00:05

## 2017-04-03 RX ADMIN — PROPOFOL 3.55 MICROGRAM(S)/KG/MIN: 10 INJECTION, EMULSION INTRAVENOUS at 17:46

## 2017-04-03 RX ADMIN — PROPOFOL 30 MILLIGRAM(S): 10 INJECTION, EMULSION INTRAVENOUS at 00:36

## 2017-04-03 RX ADMIN — PIPERACILLIN AND TAZOBACTAM 25 GRAM(S): 4; .5 INJECTION, POWDER, LYOPHILIZED, FOR SOLUTION INTRAVENOUS at 06:03

## 2017-04-03 RX ADMIN — INSULIN GLARGINE 6 UNIT(S): 100 INJECTION, SOLUTION SUBCUTANEOUS at 21:43

## 2017-04-03 RX ADMIN — CHLORHEXIDINE GLUCONATE 15 MILLILITER(S): 213 SOLUTION TOPICAL at 06:03

## 2017-04-03 RX ADMIN — HEPARIN SODIUM 900 UNIT(S)/HR: 5000 INJECTION INTRAVENOUS; SUBCUTANEOUS at 18:30

## 2017-04-03 NOTE — PROCEDURE NOTE - NSTRACHPOSTINTU_RESP_A_CORE
Breath sounds bilateral/Chest excursion noted/Chest X-Ray/Positive end tidal Co2 noted/Breath sounds equal

## 2017-04-03 NOTE — PROVIDER CONTACT NOTE (EICU) - RECOMMENDATIONS
250ml bolus  go up on Armaan  stop sedation as patient seems oversedation and will use one agent instead of both propofol and precedex

## 2017-04-03 NOTE — DIETITIAN INITIAL EVALUATION ADULT. - SIGNS/SYMPTOMS
enteral feeding rate<estimated energy needs; wt loss 6% x 4 months, NPO status x 1 day goal rate not achieved; wt loss 6% x 4 months

## 2017-04-03 NOTE — DIETITIAN INITIAL EVALUATION ADULT. - PHYSICAL APPEARANCE
BMI: 24.2 (+1 edema generalized; lt arm; rt arm; lt leg; rt leg); intubated; unable to do NFPA/underweight

## 2017-04-03 NOTE — CONSULT NOTE ADULT - SUBJECTIVE AND OBJECTIVE BOX
HPI:  HPI:  Pt. is a 85 y/o female w/pmhx of HTN, DM-2 (on lantus 16u qhs), Seizure d/o (not taking keppra), ESRD on HD m,w,f was in [facility] till friday while in HD pt pulled catheter and later was told there was fistula clot, went to vascular yesterday andafte had full HD, son states after HD pt felt weak. states she was just not herself and brings her in, no seizure like activity noted, no cp, palitations n/v/d/c no fever or chills. (2017 05:01)      Chief Complaint:  Patient is a 86y old  Female who presents with a chief complaint of Daughter stated that patient was unresponsive at home. (2017 15:53)      Review of Systems:      CV:  No pain, palpitations, hypo/hypertension  Resp:  SOB  GI:  No pain, nausea, vomiting, diarrhea, constipation  :  No pain, bleeding, incontinence, nocturia           Social History/Family History  SOCHX:   tobacco,  -  alcohol    FMHX: FA/MO  - contributory       Discussed with:  PMD, Family    Physical Exam:    Vital Signs:  Vital Signs Last 24 Hrs  T(C): 37.4, Max: 37.6 (- @ 00:41)  T(F): 99.3, Max: 99.7 (- @ 00:41)  HR: 64 (51 - 148)  BP: 116/29 (70/26 - 177/45)  BP(mean): 52 (37 - 76)  RR: 15 (12 - 34)  SpO2: 100% (30% - 100%)  Daily     Daily Weight in k.4 (2017 13:48)  I&O's Summary  I & Os for 24h ending 2017 07:00  =============================================  IN: 115.8 ml / OUT: 0 ml / NET: 115.8 ml    I & Os for current day (as of 2017 19:51)  =============================================  IN: 620.9 ml / OUT: 0 ml / NET: 620.9 ml    Chest:  rhonchi BL  Cardiovascular:  Regular rhythm, S1, S2, no murmur/rub/S3/S4, no carotid/femoral/abdominal bruit, radial/pedal pulses 2+, no edema  Abdomen:  Soft, non-tender, non-distended, normoactive bowel sounds, no HSM    Laboratory:                          8.9    19.8  )-----------( 249      ( 2017 08:24 )             27.6     2017 08:24    139    |  102    |  38     ----------------------------<  134    4.9     |  26     |  5.22     Ca    8.9        2017 08:24  Phos  4.4       2017 03:54  Mg     2.7       2017 23:05    TPro  7.1    /  Alb  2.6    /  TBili  0.5    /  DBili  x      /  AST  30     /  ALT  11     /  AlkPhos  70     2017 23:05    ABG - ( 2017 05:53 )  pH: x     /  pCO2: 33    /  pO2: 178   / HCO3: 26    / Base Excess: 3.6   /  SaO2: 99                CARDIAC MARKERS ( 2017 17:57 )  5.190 ng/mL / x     / 90 U/L / x     / 5.7 ng/mL  CARDIAC MARKERS ( 2017 08:24 )  3.640 ng/mL / x     / 121 U/L / x     / 8.2 ng/mL  CARDIAC MARKERS ( 2017 23:05 )  1.130 ng/mL / x     / 129 U/L / x     / 5.3 ng/mL  CARDIAC MARKERS ( 2017 08:18 )  .302 ng/mL / x     / 112 U/L / x     / 3.1 ng/mL  CARDIAC MARKERS ( 2017 01:02 )  .072 ng/mL / x     / 71 U/L / x     / 1.3 ng/mL      CAPILLARY BLOOD GLUCOSE  121 (2017 17:40)  112 (2017 11:56)  167 (2017 06:00)  115 (2017 22:00)    LIVER FUNCTIONS - ( 2017 23:05 )  Alb: 2.6 g/dL / Pro: 7.1 gm/dL / ALK PHOS: 70 U/L / ALT: 11 U/L / AST: 30 U/L / GGT: x           PT/INR - ( 2017 00:29 )   PT: 11.4 sec;   INR: 1.04 ratio         PTT - ( 2017 17:57 )  PTT:34.4 sec  Urinalysis Basic - ( 2017 04:22 )    Color: Yellow / Appearance: Clear / S.015 / pH: x  Gluc: x / Ketone: Negative  / Bili: Negative / Urobili: Negative mg/dL   Blood: x / Protein: 100 mg/dL / Nitrite: Negative   Leuk Esterase: Negative / RBC: 0-2 /HPF / WBC 0-2   Sq Epi: x / Non Sq Epi: Occasional / Bacteria: Few    Assessment:  Septic Shock, hypotension respiratory failure on vent support  NSTEMI in setting of ESRD and sepsis, with a history of CAD and 2 stents in the past, DM  Medical management ongoing, ASA, Plavix, Heparin IV, BB and ACEI held secondary to hypotension  Statin if not contraindicated  IV ABX  Supportive care.   Echocardiogram reviewed  EKG reviewed and compared with previous from last hospital admission here, no acute changes noted.   No need for urgent transfer for cardiology interventions.   Will observe and medically manage in CCU here  Discussed in detail with family.

## 2017-04-03 NOTE — DIETITIAN INITIAL EVALUATION ADULT. - PERTINENT LABORATORY DATA
04-03 Na 139 mmol/L Glu 134 mg/dL<H> K+ 4.9 mmol/L Cr  5.22 mg/dL<H> BUN 38 mg/dL<H> Phos n/a   Alb n/a   PAB n/a   Hgb 8.9 g/dL<L> Hct 27.6 %<L> WBC 19.8 <H>, Ca 8.9, GFR 7 <L>, HgA1C 7.2%, Phos 4.4 (4/2) Albu 2.6 <L>

## 2017-04-03 NOTE — DIETITIAN INITIAL EVALUATION ADULT. - NUTRITIONGOAL OUTCOME1
Pt to tolerate goal tube rate & meet estimated protein-energy needs; maintain current wt +/- 2 lbs Pt to tolerate goal tube rate; maintain current wt/deter further wt loss

## 2017-04-03 NOTE — CHART NOTE - NSCHARTNOTEFT_GEN_A_CORE
RR called 2200 for respiratory distress.  Patient in respiratory distress, lungs sounds very coarse.  Patient given 1 lasix IVP, started on non-rebreather with no improvement and then BiPAP also with no improvement in symptoms.  Pt upgraded to ICU level of care and intubated for airway protection (see ICU consult note).

## 2017-04-03 NOTE — DIETITIAN INITIAL EVALUATION ADULT. - NS AS NUTRI INTERV ENTERAL NUTRITION
Composition/Concentration/Rate/Continue current goal rate of Nepro with carb steady Orogastric @ 50mL/hr x 24 hrs (provides 1200mL fluid, 2160kcal, 97g pro)

## 2017-04-03 NOTE — DIETITIAN INITIAL EVALUATION ADULT. - PERTINENT MEDS FT
Aspirin, Heparin, Plavix, Peridex, Hibiclens, Zosyn IVPB, Precedex, Humalog (sliding scale) q 6 hrs, Protonix, Phenylephrine infusion, Diprivan, Lipitor, Lantus 6 units pm Aspirin, Heparin, Plavix, Peridex, Hibiclens, Zosyn IVPB, Precedex, Humalog (sliding scale) q 6 hrs, Protonix, Phenylephrine infusion, Diprivan, Lipitor, Lantus 6 units pm, Propofol 14 (15kcal)

## 2017-04-03 NOTE — CONSULT NOTE ADULT - SUBJECTIVE AND OBJECTIVE BOX
CC: SOB      HPI:  86F PMH HTN, hyperlipidemia, CAD s/p stents to LAD and RCA, CHF, DM-2 (on lantus 16u qhs), CVA, Seizure d/o (not taking keppra), dementia, hx of GI bleed, hypothyroidism, ESRD on HD m,w,f presents to hospital via ambulance for change in mental statusill friday while in HD pt pulled catheter and later was told there was fistula clot, went to vascular yesterday andafte had full HD, son states after HD pt felt weak. states she was just not herself and brings her in, no seizure like activity noted, no cp, palitations n/v/d/c no fever or chills. (2017 05:01)      Allergies    No Known Allergies    Intolerances        MEDICATIONS  (STANDING):  amLODIPine   Tablet 10milliGRAM(s) Oral daily  piperacillin/tazobactam IVPB. 3.375Gram(s) IV Intermittent every 12 hours  atorvastatin 40milliGRAM(s) Oral at bedtime  clopidogrel Tablet 75milliGRAM(s) Oral daily  insulin glargine Injectable (LANTUS) 6Unit(s) SubCutaneous at bedtime  dextrose 5%. 1000milliLiter(s) IV Continuous <Continuous>  dextrose 50% Injectable 12.5Gram(s) IV Push once  dextrose 50% Injectable 25Gram(s) IV Push once  dextrose 50% Injectable 25Gram(s) IV Push once  propofol Infusion 10MICROgram(s)/kG/Min IV Continuous <Continuous>  propofol Injectable 30milliGRAM(s) IV Push once  phenylephrine    Infusion 0.1MICROgram(s)/kG/Min IV Continuous <Continuous>  dexmedetomidine Infusion 0.2MICROgram(s)/kG/Hr IV Continuous <Continuous>  aspirin  chewable 81milliGRAM(s) Oral daily  insulin lispro (HumaLOG) corrective regimen sliding scale  SubCutaneous every 6 hours  propofol Injectable 30milliGRAM(s) IV Push once  propofol Injectable 5milliGRAM(s) IV Push once  pantoprazole  Injectable 40milliGRAM(s) IV Push daily  chlorhexidine 0.12% Liquid 15milliLiter(s) Swish and Spit two times a day  chlorhexidine 4% Liquid 1Application(s) Topical daily    MEDICATIONS  (PRN):  dextrose Gel 1Dose(s) Oral once PRN Blood Glucose LESS THAN 70 milliGRAM(s)/deciliter  glucagon  Injectable 1milliGRAM(s) IntraMuscular once PRN Glucose LESS THAN 70 milligrams/deciliter      Daily Height in cm: 154.94 (2017 15:51)    Daily     Drug Dosing Weight  Height (cm): 154.9 (2017 15:51)  Weight (kg): 59.1 (2017 15:51)  BMI (kg/m2): 24.6 (2017 15:51)  BSA (m2): 1.57 (2017 15:51)    PAST MEDICAL & SURGICAL HISTORY:  LBBB (left bundle branch block)  Seizure  H/O: Hypothyroidism  Hypertension  Acute Renal Insufficiency  CHF (Congestive Heart Failure)  Diabetes Mellitus  Dyslipidemia  S/P cardiac cath: 1 stent 10 years ago  Diabetes  FH: HTN (hypertension)  Acute CHF  No Past Surgical History      FAMILY HISTORY:  No pertinent family history in first degree relatives      SOCIAL HISTORY:    ADVANCE DIRECTIVES:    REVIEW OF SYSTEMS:    CONSTITUTIONAL: No fever, weight loss, or fatigue  EYES: No eye pain, visual disturbances, or discharge  ENMT:  No difficulty hearing, tinnitus, vertigo; No sinus or throat pain  NECK: No pain or stiffness  BREASTS: No pain, masses, or nipple discharge  RESPIRATORY: No cough, wheezing, chills or hemoptysis; No shortness of breath  CARDIOVASCULAR: No chest pain, palpitations, dizziness, or leg swelling  GASTROINTESTINAL: No abdominal or epigastric pain. No nausea, vomiting, or hematemesis; No diarrhea or constipation. No melena or hematochezia.  GENITOURINARY: No dysuria, frequency, hematuria, or incontinence  NEUROLOGICAL: No headaches, memory loss, loss of strength, numbness, or tremors  SKIN: No itching, burning, rashes, or lesions   LYMPH NODES: No enlarged glands  ENDOCRINE: No heat or cold intolerance; No hair loss  MUSCULOSKELETAL: No joint pain or swelling; No muscle, back, or extremity pain  PSYCHIATRIC: No depression, anxiety, mood swings, or difficulty sleeping  HEME/LYMPH: No easy bruising, or bleeding gums  ALLERGY AND IMMUNOLOGIC: No hives or eczema          ICU Vital Signs Last 24 Hrs  T(C): 37, Max: 37 ( @ 15:51)  T(F): 98.6, Max: 98.6 ( @ 15:51)  HR: 124 (76 - 148)  BP: 143/54 (101/38 - 143/54)  BP(mean): --  ABP: --  ABP(mean): --  RR: 18 (16 - 18)  SpO2: 100% (96% - 100%)      ABG - ( 2017 23:10 )  pH: x     /  pCO2: 42    /  pO2: 271   / HCO3: 24    / Base Excess: -0.5  /  SaO2: 99                  I&O's Detail    I & Os for current day (as of 2017 01:11)  =============================================  IN:    IV PiggyBack: 100 ml    Total IN: 100 ml  ---------------------------------------------  OUT:    Total OUT: 0 ml  ---------------------------------------------  Total NET: 100 ml      PHYSICAL EXAM:    GENERAL: NAD, well-groomed, well-developed  HEAD:  Atraumatic, Normocephalic  EYES: EOMI, PERRLA, conjunctiva and sclera clear  ENMT: No tonsillar erythema, exudates, or enlargement; Moist mucous membranes, Good dentition, No lesions  NECK: Supple, No JVD, Normal thyroid  NERVOUS SYSTEM:  Alert & Oriented X3, Good concentration; Motor Strength 5/5 B/L upper and lower extremities; DTRs 2+ intact and symmetric  CHEST/LUNG: Clear to percussion bilaterally; No rales, rhonchi, wheezing, or rubs  HEART: Regular rate and rhythm; No murmurs, rubs, or gallops  ABDOMEN: Soft, Nontender, Nondistended; Bowel sounds present  EXTREMITIES:  2+ Peripheral Pulses, No clubbing, cyanosis, or edema  LYMPH: No lymphadenopathy noted  SKIN: No rashes or lesions    LABS:  CBC Full  -  ( 2017 23:05 )  WBC Count : 25.3 K/uL  Hemoglobin : 9.2 g/dL  Hematocrit : 28.5 %  Platelet Count - Automated : 277 K/uL  Mean Cell Volume : 89.3 fl  Mean Cell Hemoglobin : 28.8 pg  Mean Cell Hemoglobin Concentration : 32.3 gm/dL  Auto Neutrophil # : 16.5 K/uL  Auto Lymphocyte # : 4.9 K/uL  Auto Monocyte # : 1.6 K/uL  Auto Eosinophil # : 1.9 K/uL  Auto Basophil # : 0.3 K/uL  Auto Neutrophil % : 65.3 %  Auto Lymphocyte % : 19.4 %  Auto Monocyte % : 6.4 %  Auto Eosinophil % : 7.7 %  Auto Basophil % : 1.1 %    2017 23:05    138    |  102    |  33     ----------------------------<  161    3.8     |  19     |  4.82     Ca    8.9        2017 23:05  Mg     2.7       2017 23:05    TPro  7.1    /  Alb  2.6    /  TBili  0.5    /  DBili  x      /  AST  30     /  ALT  11     /  AlkPhos  70     2017 23:05    CAPILLARY BLOOD GLUCOSE  115 (2017 22:00)    PT/INR - ( 2017 00:29 )   PT: 11.4 sec;   INR: 1.04 ratio         PTT - ( 2017 00:29 )  PTT:29.7 sec  Urinalysis Basic - ( 2017 04:22 )    Color: Yellow / Appearance: Clear / S.015 / pH: x  Gluc: x / Ketone: Negative  / Bili: Negative / Urobili: Negative mg/dL   Blood: x / Protein: 100 mg/dL / Nitrite: Negative   Leuk Esterase: Negative / RBC: 0-2 /HPF / WBC 0-2   Sq Epi: x / Non Sq Epi: Occasional / Bacteria: Few      CARDIAC MARKERS ( 2017 23:05 )  1.130 ng/mL / x     / 129 U/L / x     / 5.3 ng/mL  CARDIAC MARKERS ( 2017 08:18 )  .302 ng/mL / x     / 112 U/L / x     / 3.1 ng/mL  CARDIAC MARKERS ( 2017 01:02 )  .072 ng/mL / x     / 71 U/L / x     / 1.3 ng/mL          EKG:    ECHO, US:    RADIOLOGY:    CRITICAL CARE TIME SPENT: CC: SOB      HPI:  86F PMH HTN, hyperlipidemia, CAD s/p stents to LAD and RCA, CHF, DM-2 (on lantus 16u qhs), CVA, Seizure d/o (not taking keppra), dementia, hx of GI bleed, hypothyroidism, ESRD on HD m,w,f presents to hospital via ambulance for change in mental status. Pt had been in her usual state of health, on Friday while at HD pt pulled HD access out while connected to machine and later was told there was fistula clot. Seen by outpt vascular and had full HD. Post dialysis pt with generalized weakness and "not acting herself". No seizure like activity noted, no cp, palpitations, no fevers or chills at home. Here in ER found to have leukocytosis WBC 17.8 Received 1500cc NS bolus with NS 75cc/hr IVF. Admitted to medical floor for sepsis and work up of infectious etiology.    Rapid response called on night of  for respiratory distress. Pt tachypneic RR 30s with accessory muscle use. Audible wheeze and crackles bilaterally. Tachycardic HR 130s (sinus).   Treated with atrovent neb x2.   Lasix 40mg IVP x1.   Placed on BiPAP 12/ initially but with low TV. Setting changed to iPAP 14 with improved TV 300s.    Transferred to ICU for further care      Allergies: No Known Allergies    MEDICATIONS  (STANDING):  amLODIPine   Tablet 10milliGRAM(s) Oral daily  piperacillin/tazobactam IVPB. 3.375Gram(s) IV Intermittent every 12 hours  atorvastatin 40milliGRAM(s) Oral at bedtime  clopidogrel Tablet 75milliGRAM(s) Oral daily  insulin glargine Injectable (LANTUS) 6Unit(s) SubCutaneous at bedtime  dextrose 5%. 1000milliLiter(s) IV Continuous <Continuous>  dextrose 50% Injectable 12.5Gram(s) IV Push once  dextrose 50% Injectable 25Gram(s) IV Push once  dextrose 50% Injectable 25Gram(s) IV Push once  aspirin  chewable 81milliGRAM(s) Oral daily  insulin lispro (HumaLOG) corrective regimen sliding scale  SubCutaneous every 6 hours    MEDICATIONS  (PRN):  dextrose Gel 1Dose(s) Oral once PRN Blood Glucose LESS THAN 70 milliGRAM(s)/deciliter  glucagon  Injectable 1milliGRAM(s) IntraMuscular once PRN Glucose LESS THAN 70 milligrams/deciliter      Daily Height in cm: 154.94 (2017 15:51)    Daily     Drug Dosing Weight  Height (cm): 154.9 (2017 15:51)  Weight (kg): 59.1 (2017 15:51)  BMI (kg/m2): 24.6 (2017 15:51)  BSA (m2): 1.57 (2017 15:51)    PAST MEDICAL & SURGICAL HISTORY:  LBBB (left bundle branch block)  Seizure  Hypothyroidism  Hypertension  ESRD on HD  CHF (Congestive Heart Failure)  CAD  CVA  Diabetes Mellitus  Dyslipidemia  Dementia  S/P cardiac cath: s/p stent  Diabetes        FAMILY HISTORY:  HTN    SOCIAL HISTORY:  no smoking     ADVANCE DIRECTIVES:  full code - d/w daughter Lu and son Ihsan    REVIEW OF SYSTEMS:  + SOB  - CP  unable to obtain further due to respiratory distress    Vital Signs Last 24 Hrs  T(C): 37, Max: 37 ( @ 15:51)  T(F): 98.6, Max: 98.6 ( @ 15:51)  HR: 124 (76 - 148)  BP: 143/54 (101/38 - 143/54)  RR: 32 (16 - 18)  SpO2: 100% (96% - 100%)      ABG - ( 2017 23:10 )  pH: x     /  pCO2: 42    /  pO2: 271   / HCO3: 24    / Base Excess: -0.5  /  SaO2: 99                  I&O's Detail    I & Os for current day (as of 2017 01:11)  =============================================  IN:    IV PiggyBack: 100 ml    Total IN: 100 ml  ---------------------------------------------  OUT:    Total OUT: 0 ml  ---------------------------------------------  Total NET: 100 ml      PHYSICAL EXAM:    GENERAL: NAD, well-groomed, well-developed  HEAD:  Atraumatic, Normocephalic  EYES: EOMI, PERRLA, conjunctiva and sclera clear  ENMT: No tonsillar erythema, exudates, or enlargement; Moist mucous membranes, Good dentition, No lesions  NECK: Supple, No JVD, Normal thyroid  NERVOUS SYSTEM:  Alert & Oriented X3, Good concentration; Motor Strength 5/5 B/L upper and lower extremities; DTRs 2+ intact and symmetric  CHEST/LUNG: Clear to percussion bilaterally; No rales, rhonchi, wheezing, or rubs  HEART: Regular rate and rhythm; No murmurs, rubs, or gallops  ABDOMEN: Soft, Nontender, Nondistended; Bowel sounds present  EXTREMITIES:  2+ Peripheral Pulses, No clubbing, cyanosis, or edema  LYMPH: No lymphadenopathy noted  SKIN: No rashes or lesions    LABS:  CBC Full  -  ( 2017 23:05 )  WBC Count : 25.3 K/uL  Hemoglobin : 9.2 g/dL  Hematocrit : 28.5 %  Platelet Count - Automated : 277 K/uL  Mean Cell Volume : 89.3 fl  Mean Cell Hemoglobin : 28.8 pg  Mean Cell Hemoglobin Concentration : 32.3 gm/dL  Auto Neutrophil # : 16.5 K/uL  Auto Lymphocyte # : 4.9 K/uL  Auto Monocyte # : 1.6 K/uL  Auto Eosinophil # : 1.9 K/uL  Auto Basophil # : 0.3 K/uL  Auto Neutrophil % : 65.3 %  Auto Lymphocyte % : 19.4 %  Auto Monocyte % : 6.4 %  Auto Eosinophil % : 7.7 %  Auto Basophil % : 1.1 %    2017 23:05    138    |  102    |  33     ----------------------------<  161    3.8     |  19     |  4.82     Ca    8.9        2017 23:05  Mg     2.7       2017 23:05    TPro  7.1    /  Alb  2.6    /  TBili  0.5    /  DBili  x      /  AST  30     /  ALT  11     /  AlkPhos  70     2017 23:05    CAPILLARY BLOOD GLUCOSE  115 (2017 22:00)    PT/INR - ( 2017 00:29 )   PT: 11.4 sec;   INR: 1.04 ratio         PTT - ( 2017 00:29 )  PTT:29.7 sec  Urinalysis Basic - ( 2017 04:22 )    Color: Yellow / Appearance: Clear / S.015 / pH: x  Gluc: x / Ketone: Negative  / Bili: Negative / Urobili: Negative mg/dL   Blood: x / Protein: 100 mg/dL / Nitrite: Negative   Leuk Esterase: Negative / RBC: 0-2 /HPF / WBC 0-2   Sq Epi: x / Non Sq Epi: Occasional / Bacteria: Few      CARDIAC MARKERS ( 2017 23:05 )  1.130 ng/mL / x     / 129 U/L / x     / 5.3 ng/mL  CARDIAC MARKERS ( 2017 08:18 )  .302 ng/mL / x     / 112 U/L / x     / 3.1 ng/mL  CARDIAC MARKERS ( 2017 01:02 )  .072 ng/mL / x     / 71 U/L / x     / 1.3 ng/mL          EKG:    ECHO, US:    RADIOLOGY:    CRITICAL CARE TIME SPENT: CC: SOB      HPI:  86F PMH HTN, hyperlipidemia, CAD s/p stents to LAD and RCA, CHF, DM-2 (on lantus 16u qhs), CVA, Seizure d/o (not taking keppra), dementia, hx of GI bleed, hypothyroidism, ESRD on HD m,w,f presents to hospital via ambulance for change in mental status. Pt had been in her usual state of health, on Friday while at HD pt pulled HD access out while connected to machine and later was told there was fistula clot. Seen by outpt vascular and had full HD. Post dialysis pt with generalized weakness and "not acting herself". No seizure like activity noted, no cp, palpitations, no fevers or chills at home. Here in ER found to have leukocytosis WBC 17.8 Received 1500cc NS bolus with NS 75cc/hr IVF. Admitted to medical floor for sepsis and work up of infectious etiology.    Rapid response called on night of  for respiratory distress. Pt tachypneic RR 30s with accessory muscle use. Audible wheeze and crackles bilaterally. Tachycardic HR 130s (sinus).   Treated with atrovent neb x2.   Lasix 40mg IVP x1.   Placed on BiPAP / initially but with low TV. Setting changed to iPAP 14 with improved TV 300s.    Transferred to ICU for further care    Son initially postponing intubation as he felt he can "calm" pt's breathing. Son felt it was her seizures causing her to breathe fast, however explained to son that pt is not actively seizing as she is awake, following commands. Pt progressively becoming lethargic on BiPAP. Remains tachypneic with accessory muscle use. Spoke again with son and daughter that pt is "tiring out" from respiratory distress and that a definitive decision regarding intubation needs to be made. Expressed concerns for "fluid build up in lungs"/ pulmonary edema.  Family now agreeable for intubation. Pt intubated. ETT with bloody frothy secretions.       Allergies: No Known Allergies    MEDICATIONS  (STANDING):  amLODIPine   Tablet 10milliGRAM(s) Oral daily  piperacillin/tazobactam IVPB. 3.375Gram(s) IV Intermittent every 12 hours  atorvastatin 40milliGRAM(s) Oral at bedtime  clopidogrel Tablet 75milliGRAM(s) Oral daily  insulin glargine Injectable (LANTUS) 6Unit(s) SubCutaneous at bedtime  dextrose 5%. 1000milliLiter(s) IV Continuous <Continuous>  dextrose 50% Injectable 12.5Gram(s) IV Push once  dextrose 50% Injectable 25Gram(s) IV Push once  dextrose 50% Injectable 25Gram(s) IV Push once  aspirin  chewable 81milliGRAM(s) Oral daily  insulin lispro (HumaLOG) corrective regimen sliding scale  SubCutaneous every 6 hours    MEDICATIONS  (PRN):  dextrose Gel 1Dose(s) Oral once PRN Blood Glucose LESS THAN 70 milliGRAM(s)/deciliter  glucagon  Injectable 1milliGRAM(s) IntraMuscular once PRN Glucose LESS THAN 70 milligrams/deciliter      Daily Height in cm: 154.94 (2017 15:51)      Drug Dosing Weight  Height (cm): 154.9 (2017 15:51)  Weight (kg): 59.1 (2017 15:51)  BMI (kg/m2): 24.6 (2017 15:51)  BSA (m2): 1.57 (2017 15:51)    PAST MEDICAL & SURGICAL HISTORY:  LBBB (left bundle branch block)  Seizure  Hypothyroidism  Hypertension  ESRD on HD  CHF (Congestive Heart Failure)  CAD  CVA  Diabetes Mellitus  Dyslipidemia  Dementia  S/P cardiac cath: s/p stent  Diabetes        FAMILY HISTORY:  HTN    SOCIAL HISTORY:  no smoking     ADVANCE DIRECTIVES:  full code - d/w daughter Lu and son Ihsan    REVIEW OF SYSTEMS:  + SOB  - CP  unable to obtain further due to respiratory distress    Vital Signs Last 24 Hrs  T(C): 37, Max: 37 ( @ 15:51)  T(F): 98.6, Max: 98.6 ( @ 15:51)  HR: 124 (76 - 148)  BP: 143/54 (101/38 - 143/54)  RR: 32 (16 - 18)  SpO2: 100% (96% - 100%)      ABG - ( 2017 23:10 )  pH: 7.37   /  pCO2: 42    /  pO2: 271   / HCO3: 24    / Base Excess: -0.5  /  SaO2: 99    (on BiPAP 14/5 FIO2: 100)    I&O's Detail    I & Os for current day (as of 2017 01:11)  =============================================  IN:    IV PiggyBack: 100 ml    Total IN: 100 ml  ---------------------------------------------  OUT:    Total OUT: 0 ml  ---------------------------------------------  Total NET: 100 ml      PHYSICAL EXAM:    GENERAL: NAD, well-groomed, well-developed  HEAD:  Atraumatic, Normocephalic  EYES: EOMI, PERRLA, conjunctiva and sclera clear  ENMT: No tonsillar erythema, exudates, or enlargement; Moist mucous membranes, Good dentition, No lesions  NECK: Supple, No JVD, Normal thyroid  NERVOUS SYSTEM:  Alert & Oriented X3, Good concentration; Motor Strength 5/5 B/L upper and lower extremities; DTRs 2+ intact and symmetric  CHEST/LUNG: Clear to percussion bilaterally; No rales, rhonchi, wheezing, or rubs  HEART: Regular rate and rhythm; No murmurs, rubs, or gallops  ABDOMEN: Soft, Nontender, Nondistended; Bowel sounds present  EXTREMITIES:  2+ Peripheral Pulses, No clubbing, cyanosis, or edema  LYMPH: No lymphadenopathy noted  SKIN: No rashes or lesions    LABS:  CBC Full  -  ( 2017 23:05 )  WBC Count : 25.3 K/uL  Hemoglobin : 9.2 g/dL  Hematocrit : 28.5 %  Platelet Count - Automated : 277 K/uL  Mean Cell Volume : 89.3 fl  Mean Cell Hemoglobin : 28.8 pg  Mean Cell Hemoglobin Concentration : 32.3 gm/dL  Auto Neutrophil # : 16.5 K/uL  Auto Lymphocyte # : 4.9 K/uL  Auto Monocyte # : 1.6 K/uL  Auto Eosinophil # : 1.9 K/uL  Auto Basophil # : 0.3 K/uL  Auto Neutrophil % : 65.3 %  Auto Lymphocyte % : 19.4 %  Auto Monocyte % : 6.4 %  Auto Eosinophil % : 7.7 %  Auto Basophil % : 1.1 %    2017 23:05    138    |  102    |  33     ----------------------------<  161    3.8     |  19     |  4.82     Ca    8.9        2017 23:05  Mg     2.7       2017 23:05    TPro  7.1    /  Alb  2.6    /  TBili  0.5    /  DBili  x      /  AST  30     /  ALT  11     /  AlkPhos  70     2017 23:05    CAPILLARY BLOOD GLUCOSE  115 (2017 22:00)    PT/INR - ( 2017 00:29 )   PT: 11.4 sec;   INR: 1.04 ratio         PTT - ( 2017 00:29 )  PTT:29.7 sec  Urinalysis Basic - ( 2017 04:22 )    Color: Yellow / Appearance: Clear / S.015 / pH: x  Gluc: x / Ketone: Negative  / Bili: Negative / Urobili: Negative mg/dL   Blood: x / Protein: 100 mg/dL / Nitrite: Negative   Leuk Esterase: Negative / RBC: 0-2 /HPF / WBC 0-2   Sq Epi: x / Non Sq Epi: Occasional / Bacteria: Few      CARDIAC MARKERS ( 2017 23:05 )  1.130 ng/mL / x     / 129 U/L / x     / 5.3 ng/mL  CARDIAC MARKERS ( 2017 08:18 )  .302 ng/mL / x     / 112 U/L / x     / 3.1 ng/mL  CARDIAC MARKERS ( 2017 01:02 )  .072 ng/mL / x     / 71 U/L / x     / 1.3 ng/mL          EKG:    ECHO, US:    RADIOLOGY:    CRITICAL CARE TIME SPENT: CC: SOB      HPI:  86F PMH HTN, hyperlipidemia, CAD s/p stents to LAD and RCA, CHF, DM-2 (on lantus 16u qhs), CVA, Seizure d/o (not taking keppra), dementia, hx of GI bleed, hypothyroidism, ESRD on HD m,w,f presents to hospital via ambulance for change in mental status. Pt had been in her usual state of health, on Friday while at HD pt pulled HD access out while connected to machine and later was told there was fistula clot. Seen by outpt vascular and had full HD. Post dialysis pt with generalized weakness and "not acting herself". No seizure like activity noted, no cp, palpitations, no fevers or chills at home. Here in ER found to have leukocytosis WBC 17.8 Received 1500cc NS bolus with NS 75cc/hr IVF. Admitted to medical floor for sepsis and work up of infectious etiology.    Rapid response called on night of 4/2 for respiratory distress. Pt tachypneic RR 30s with accessory muscle use. Audible wheeze and crackles bilaterally. Tachycardic HR 130s (sinus).   Treated with atrovent neb x2.   Lasix 40mg IVP x1.   Placed on BiPAP 12/4 initially but with low TV. Setting changed to iPAP 14 with improved TV 300s.    Transferred to ICU for further care    Son initially postponing intubation as he felt he can "calm" pt's breathing. Son felt it was her seizures causing her to breathe fast, however explained to son that pt is not actively seizing as she is awake, following commands. Pt progressively becoming lethargic on BiPAP. Remains tachypneic with accessory muscle use. Spoke again with son and daughter that pt is "tiring out" from respiratory distress and that a definitive decision regarding intubation needs to be made. Expressed concerns for "fluid build up in lungs"/ pulmonary edema.  Family now agreeable for intubation. Pt intubated. ETT with bloody frothy secretions.       Allergies: No Known Allergies    MEDICATIONS  (STANDING):  amLODIPine   Tablet 10milliGRAM(s) Oral daily  piperacillin/tazobactam IVPB. 3.375Gram(s) IV Intermittent every 12 hours  atorvastatin 40milliGRAM(s) Oral at bedtime  clopidogrel Tablet 75milliGRAM(s) Oral daily  insulin glargine Injectable (LANTUS) 6Unit(s) SubCutaneous at bedtime  dextrose 5%. 1000milliLiter(s) IV Continuous <Continuous>  dextrose 50% Injectable 12.5Gram(s) IV Push once  dextrose 50% Injectable 25Gram(s) IV Push once  dextrose 50% Injectable 25Gram(s) IV Push once  aspirin  chewable 81milliGRAM(s) Oral daily  insulin lispro (HumaLOG) corrective regimen sliding scale  SubCutaneous every 6 hours    MEDICATIONS  (PRN):  dextrose Gel 1Dose(s) Oral once PRN Blood Glucose LESS THAN 70 milliGRAM(s)/deciliter  glucagon  Injectable 1milliGRAM(s) IntraMuscular once PRN Glucose LESS THAN 70 milligrams/deciliter      Daily Height in cm: 154.94 (02 Apr 2017 15:51)      Drug Dosing Weight  Height (cm): 154.9 (02 Apr 2017 15:51)  Weight (kg): 59.1 (02 Apr 2017 15:51)  BMI (kg/m2): 24.6 (02 Apr 2017 15:51)  BSA (m2): 1.57 (02 Apr 2017 15:51)    PAST MEDICAL & SURGICAL HISTORY:  LBBB (left bundle branch block)  Seizure  Hypothyroidism  Hypertension  ESRD on HD  CHF (Congestive Heart Failure)  CAD  CVA  Diabetes Mellitus  Dyslipidemia  Dementia  S/P cardiac cath: s/p stent  Diabetes        FAMILY HISTORY:  HTN    SOCIAL HISTORY:  no smoking     ADVANCE DIRECTIVES:  full code - d/w daughter Lu and son Ihsan    REVIEW OF SYSTEMS:  + SOB  - CP  unable to obtain further due to respiratory distress    Vital Signs Last 24 Hrs  T(C): 37, Max: 37 (04-02 @ 15:51)  T(F): 98.6, Max: 98.6 (04-02 @ 15:51)  HR: 124 (76 - 148)  BP: 143/54 (101/38 - 143/54)  RR: 32 (16 - 18)  SpO2: 100% (96% - 100%)      ABG - ( 02 Apr 2017 23:10 )  pH: 7.37   /  pCO2: 42    /  pO2: 271   / HCO3: 24    / Base Excess: -0.5  /  SaO2: 99    (on BiPAP 14/5 FIO2: 100)          PHYSICAL EXAM:    GENERAL: elderly female, creole speaking, in acute respiratory distress  HEAD:  Atraumatic, Normocephalic  EYES: EOMI, conjunctiva and sclera clear  NECK: Supple, No JVD  NERVOUS SYSTEM:  awake, arousable, follows commands  CHEST/LUNG: bilateral wheeze and crackles  HEART: tachycardic  ABDOMEN: Soft, Nontender, Nondistended; Bowel sounds present  EXTREMITIES:  2+ Peripheral Pulses, LUE fistula, no edema    LABS:  CBC Full  -  ( 02 Apr 2017 23:05 )  WBC Count : 25.3 K/uL  Hemoglobin : 9.2 g/dL  Hematocrit : 28.5 %  Platelet Count - Automated : 277 K/uL  Mean Cell Volume : 89.3 fl  Mean Cell Hemoglobin : 28.8 pg  Mean Cell Hemoglobin Concentration : 32.3 gm/dL  Auto Neutrophil # : 16.5 K/uL  Auto Lymphocyte # : 4.9 K/uL  Auto Monocyte # : 1.6 K/uL  Auto Eosinophil # : 1.9 K/uL  Auto Basophil # : 0.3 K/uL  Auto Neutrophil % : 65.3 %  Auto Lymphocyte % : 19.4 %  Auto Monocyte % : 6.4 %  Auto Eosinophil % : 7.7 %  Auto Basophil % : 1.1 %      Comprehensive Metabolic Panel (04.02.17 @ 23:05)    Sodium, Serum: 138 mmol/L    Potassium, Serum: 3.8 mmol/L    Chloride, Serum: 102 mmol/L    Carbon Dioxide, Serum: 19 mmol/L    Anion Gap, Serum: 17 mmol/L    Blood Urea Nitrogen, Serum: 33 mg/dL    Creatinine, Serum: 4.82 mg/dL    Glucose, Serum: 161 mg/dL    Calcium, Total Serum: 8.9 mg/dL    Protein Total, Serum: 7.1 gm/dL    Albumin, Serum: 2.6 g/dL    Bilirubin Total, Serum: 0.5 mg/dL    Alkaline Phosphatase, Serum: 70 U/L    Aspartate Aminotransferase (AST/SGOT): 30 U/L    Alanine Aminotransferase (ALT/SGPT): 11 U/L    Lactate, Blood (04.02.17 @ 08:18)    Lactate, Blood: 1.3 mmol/L    Troponin I, Serum (04.02.17 @ 23:05): 1.130 ng/mL  Troponin I, Serum (04.02.17 @ 08:18): 0.302 ng/mL    RADIOLOGY:  CXR: (my read) vascular congestion with ? R infiltrate

## 2017-04-03 NOTE — DIETITIAN INITIAL EVALUATION ADULT. - OTHER INFO
Pt was seen for CCU admission.  Pt with ESRD; goes to HD Mo, Tu, Fri.  Orogastric tube feed initiated today(4/3) @ 10am; current feed @ 20mL/hr x 24 hrs (40% goal rate); RN states pt tolerating tube feed well (no residuals); RN states tube feed will increase 10mL @ 4pm today.  Admission wt(4/2) 59.1kg; current wt(4/3) 57.4kg; 3% wt loss x 1 day due to fluid fluctuation and inadequate intake.  As per previous nutrition assessment(1/6) pt previous wt 59.8kg; 6% wt loss x 4 months; previous nutrition dx increased protein needs due to HD & NPO x 1 day. RN reports no N/V/D/C. Last BM unknown. As per Patient Profile document, pt resides with children PTA; pt has HHA assist with ADL. Pt controls DM with lantus 16 units x 1 daily.

## 2017-04-03 NOTE — CONSULT NOTE ADULT - SUBJECTIVE AND OBJECTIVE BOX
Patient is a 86y old  Female who presents with a chief complaint of Daughter stated that patient was unresponsive at home. (2017 15:53)    HPI:  Pt. is a 85 y/o female w/pmhx of HTN, DM-2 (on lantus 16u qhs), Seizure d/o (not taking keppra), ESRD on HD m,w,f was in [facility] till friday while in HD pt pulled catheter and later was told there was fistula clot, went to vascular yesterday andafte had full HD, son states after HD pt felt weak. states she was just not herself and brings her in, no seizure like activity noted, no cp, palitations n/v/d/c no fever or chills. (2017 05:01)    Patient completed HD Friday and Saturday 3.5 hours, post thrombectomy Saturday with uneventful HD post procedure; she achieved her EDW.  developed fever, hyperglycemia and hyporesponsiveness.   CXR with infiltrates.    PAST MEDICAL & SURGICAL HISTORY:  LBBB (left bundle branch block)  Seizure  H/O: Hypothyroidism  Hypertension  Acute Renal Insufficiency  CHF (Congestive Heart Failure)  Diabetes Mellitus  Dyslipidemia  S/P cardiac cath: 1 stent 10 years ago  Diabetes  FH: HTN (hypertension)  Acute CHF  No Past Surgical History    FAMILY HISTORY:  No pertinent family history in first degree relatives    No Known Allergies    MEDICATIONS  (STANDING):  piperacillin/tazobactam IVPB. 3.375Gram(s) IV Intermittent every 12 hours  atorvastatin 40milliGRAM(s) Oral at bedtime  clopidogrel Tablet 75milliGRAM(s) Oral daily  insulin glargine Injectable (LANTUS) 6Unit(s) SubCutaneous at bedtime  dextrose 5%. 1000milliLiter(s) IV Continuous <Continuous>  dextrose 50% Injectable 12.5Gram(s) IV Push once  dextrose 50% Injectable 25Gram(s) IV Push once  dextrose 50% Injectable 25Gram(s) IV Push once  propofol Infusion 10MICROgram(s)/kG/Min IV Continuous <Continuous>  dexmedetomidine Infusion 0.2MICROgram(s)/kG/Hr IV Continuous <Continuous>  aspirin  chewable 81milliGRAM(s) Oral daily  insulin lispro (HumaLOG) corrective regimen sliding scale  SubCutaneous every 6 hours  pantoprazole  Injectable 40milliGRAM(s) IV Push daily  chlorhexidine 0.12% Liquid 15milliLiter(s) Swish and Spit two times a day  chlorhexidine 4% Liquid 1Application(s) Topical daily  phenylephrine    Infusion 0.1MICROgram(s)/kG/Min IV Continuous <Continuous>  heparin  Infusion. Unit(s)/Hr IV Continuous <Continuous>    MEDICATIONS  (PRN):  dextrose Gel 1Dose(s) Oral once PRN Blood Glucose LESS THAN 70 milliGRAM(s)/deciliter  glucagon  Injectable 1milliGRAM(s) IntraMuscular once PRN Glucose LESS THAN 70 milligrams/deciliter  heparin  Injectable 3500Unit(s) IV Push every 6 hours PRN For aPTT less than 40    Vital Signs Last 24 Hrs  T(C): 37.2, Max: 37.6 ( @ 00:41)  T(F): 99, Max: 99.7 ( @ 00:41)  HR: 64 (51 - 148)  BP: 113/40 (70/26 - 177/45)  BP(mean): 59 (37 - 76)  RR: 17 (12 - 34)  SpO2: 100% (30% - 100%)  Mode: AC/ CMV (Assist Control/ Continuous Mandatory Ventilation)  RR (machine): 12  TV (machine): 380  FiO2: 30  PEEP: 5  ITime: 1  MAP: 8  PIP: 22    CAPILLARY BLOOD GLUCOSE  112 (2017 11:56)  167 (2017 06:00)  115 (2017 22:00)  99 (2017 16:40)    PHYSICAL EXAM:      T(C): 37.2, Max: 37.6 ( @ 00:41)  HR: 64 (51 - 148)  BP: 113/40 (70/26 - 177/45)  RR: 17 (12 - 34)  SpO2: 100% (30% - 100%)  Wt(kg): --  Respiratory: clear anteriorly, decreased BS at bases  Cardiovascular: S1 S2  Gastrointestinal: soft NT ND +BS  Extremities: tr  edema  LUE + AVF              2017 08:24    139    |  102    |  38     ----------------------------<  134    4.9     |  26     |  5.22     Ca    8.9        2017 08:24  Phos  4.4       2017 03:54  Mg     2.7       2017 23:05    TPro  7.1    /  Alb  2.6    /  TBili  0.5    /  DBili  x      /  AST  30     /  ALT  11     /  AlkPhos  70     2017 23:05                          8.9    19.8  )-----------( 249      ( 2017 08:24 )             27.6     Urinalysis Basic - ( 2017 04:22 )    Color: Yellow / Appearance: Clear / S.015 / pH: x  Gluc: x / Ketone: Negative  / Bili: Negative / Urobili: Negative mg/dL   Blood: x / Protein: 100 mg/dL / Nitrite: Negative   Leuk Esterase: Negative / RBC: 0-2 /HPF / WBC 0-2   Sq Epi: x / Non Sq Epi: Occasional / Bacteria: Few      ABG - ( 2017 05:53 )  pH: x     /  pCO2: 33    /  pO2: 178   / HCO3: 26    / Base Excess: 3.6   /  SaO2: 99                    Assessment and Plan    ESRD, sepsis, PNA;   Holding HD today as per critical care team; PNA sepsis main culprit of respiratory failure; no emergent indication.  Cardio follow up with increase Troponins.  BC; empiric abx cover gram positives post thrombectomy.   Will follow.

## 2017-04-03 NOTE — CONSULT NOTE ADULT - ASSESSMENT
86F PMH HTN, hyperlipidemia, CAD s/p stents to LAD and RCA, CHF, DM-2 (on lantus 16u qhs), CVA, Seizure d/o (not taking keppra), dementia, hx of GI bleed, hypothyroidism, ESRD on HD m,w,f presents to hospital via ambulance for change in mental status.  Here in ER found to have leukocytosis WBC 17.8. Rapid response on medical floor for respiratory distress, pulmonary edema. Troponin elevated r/o ACS.  Transferred to ICU, failed BiPAP. Intubated.     1. PULM  - acute respiratory failure due to acute pulmonary edema vs PNA  - cont broad spectrum antibiotics  - check sputum culture  - RVP negative  - mechanical ventilation  - wean as tolerates  - will contact renal for HD and fluid removal    2. CV  - troponin rising  - cont asa and plavix  - EKG  - check 2D echo  - trend cardiac enzymes  - cardiology consult    3. ID  - sepsis: unable to give IVF challenge at RRT due to pulmonary edema and HTN  - pt diuresed instead  - follow up blood culture, u cx, sputum cx  - vanco by level and zosyn    4. GEN  - advanced directives addressed with son and daughter: pt full code    Total Critical Care Time: 90 min no including procedures

## 2017-04-04 LAB
ALBUMIN SERPL ELPH-MCNC: 2.1 G/DL — LOW (ref 3.3–5)
ALP SERPL-CCNC: 62 U/L — SIGNIFICANT CHANGE UP (ref 40–120)
ALT FLD-CCNC: 117 U/L — HIGH (ref 12–78)
ANION GAP SERPL CALC-SCNC: 17 MMOL/L — SIGNIFICANT CHANGE UP (ref 5–17)
APTT BLD: 165.7 SEC — CRITICAL HIGH (ref 27.5–37.4)
APTT BLD: 66.9 SEC — HIGH (ref 27.5–37.4)
APTT BLD: 86.7 SEC — HIGH (ref 27.5–37.4)
AST SERPL-CCNC: 242 U/L — HIGH (ref 15–37)
BASE EXCESS BLDA CALC-SCNC: 0.6 MMOL/L — SIGNIFICANT CHANGE UP (ref -2–2)
BASE EXCESS BLDA CALC-SCNC: 6.8 MMOL/L — HIGH (ref -2–2)
BILIRUB SERPL-MCNC: 0.3 MG/DL — SIGNIFICANT CHANGE UP (ref 0.2–1.2)
BLOOD GAS COMMENTS: SIGNIFICANT CHANGE UP
BLOOD GAS SOURCE: SIGNIFICANT CHANGE UP
BLOOD GAS SOURCE: SIGNIFICANT CHANGE UP
BUN SERPL-MCNC: 45 MG/DL — HIGH (ref 7–23)
CALCIUM SERPL-MCNC: 7.9 MG/DL — LOW (ref 8.5–10.1)
CHLORIDE SERPL-SCNC: 102 MMOL/L — SIGNIFICANT CHANGE UP (ref 96–108)
CK MB BLD-MCNC: 5.3 % — HIGH (ref 0–3.5)
CK MB CFR SERPL CALC: 3.2 NG/ML — SIGNIFICANT CHANGE UP (ref 0.5–3.6)
CK SERPL-CCNC: 60 U/L — SIGNIFICANT CHANGE UP (ref 26–192)
CO2 SERPL-SCNC: 23 MMOL/L — SIGNIFICANT CHANGE UP (ref 22–31)
CREAT SERPL-MCNC: 6.23 MG/DL — HIGH (ref 0.5–1.3)
GLUCOSE SERPL-MCNC: 99 MG/DL — SIGNIFICANT CHANGE UP (ref 70–99)
HCO3 BLDA-SCNC: 25 MMOL/L — SIGNIFICANT CHANGE UP (ref 21–29)
HCO3 BLDA-SCNC: 30 MMOL/L — HIGH (ref 21–29)
HCT VFR BLD CALC: 25.6 % — LOW (ref 34.5–45)
HGB BLD-MCNC: 8.1 G/DL — LOW (ref 11.5–15.5)
HOROWITZ INDEX BLDA+IHG-RTO: 30 — SIGNIFICANT CHANGE UP
HOROWITZ INDEX BLDA+IHG-RTO: 30 — SIGNIFICANT CHANGE UP
MAGNESIUM SERPL-MCNC: 2.7 MG/DL — HIGH (ref 1.8–2.4)
MCHC RBC-ENTMCNC: 28.3 PG — SIGNIFICANT CHANGE UP (ref 27–34)
MCHC RBC-ENTMCNC: 31.7 GM/DL — LOW (ref 32–36)
MCV RBC AUTO: 89.5 FL — SIGNIFICANT CHANGE UP (ref 80–100)
PCO2 BLDA: 38 MMHG — SIGNIFICANT CHANGE UP (ref 32–46)
PCO2 BLDA: 40 MMHG — SIGNIFICANT CHANGE UP (ref 32–46)
PH BLD: 7.41 — SIGNIFICANT CHANGE UP (ref 7.35–7.45)
PH BLD: 7.51 — HIGH (ref 7.35–7.45)
PHOSPHATE SERPL-MCNC: 4.9 MG/DL — HIGH (ref 2.5–4.5)
PLATELET # BLD AUTO: 233 K/UL — SIGNIFICANT CHANGE UP (ref 150–400)
PO2 BLDA: 112 MMHG — HIGH (ref 74–108)
PO2 BLDA: 120 MMHG — HIGH (ref 74–108)
POTASSIUM SERPL-MCNC: 4.4 MMOL/L — SIGNIFICANT CHANGE UP (ref 3.5–5.3)
POTASSIUM SERPL-SCNC: 4.4 MMOL/L — SIGNIFICANT CHANGE UP (ref 3.5–5.3)
PROT SERPL-MCNC: 5.9 GM/DL — LOW (ref 6–8.3)
RBC # BLD: 2.86 M/UL — LOW (ref 3.8–5.2)
RBC # FLD: 20.4 % — HIGH (ref 11–15)
SAO2 % BLDA: 95 % — SIGNIFICANT CHANGE UP (ref 92–96)
SAO2 % BLDA: 96 % — SIGNIFICANT CHANGE UP (ref 92–96)
SODIUM SERPL-SCNC: 142 MMOL/L — SIGNIFICANT CHANGE UP (ref 135–145)
TROPONIN I SERPL-MCNC: 4.51 NG/ML — HIGH (ref 0.01–0.04)
VANCOMYCIN TROUGH SERPL-MCNC: 14.8 UG/ML — SIGNIFICANT CHANGE UP (ref 10–20)
WBC # BLD: 10.7 K/UL — HIGH (ref 3.8–10.5)
WBC # FLD AUTO: 10.7 K/UL — HIGH (ref 3.8–10.5)

## 2017-04-04 PROCEDURE — 71010: CPT | Mod: 26,77

## 2017-04-04 PROCEDURE — 71010: CPT | Mod: 26

## 2017-04-04 RX ORDER — ERYTHROPOIETIN 10000 [IU]/ML
10000 INJECTION, SOLUTION INTRAVENOUS; SUBCUTANEOUS ONCE
Qty: 0 | Refills: 0 | Status: COMPLETED | OUTPATIENT
Start: 2017-04-04 | End: 2017-04-04

## 2017-04-04 RX ORDER — VANCOMYCIN HCL 1 G
500 VIAL (EA) INTRAVENOUS ONCE
Qty: 0 | Refills: 0 | Status: COMPLETED | OUTPATIENT
Start: 2017-04-04 | End: 2017-04-04

## 2017-04-04 RX ORDER — METOPROLOL TARTRATE 50 MG
5 TABLET ORAL ONCE
Qty: 0 | Refills: 0 | Status: COMPLETED | OUTPATIENT
Start: 2017-04-04 | End: 2017-04-04

## 2017-04-04 RX ORDER — HALOPERIDOL DECANOATE 100 MG/ML
2.5 INJECTION INTRAMUSCULAR ONCE
Qty: 0 | Refills: 0 | Status: DISCONTINUED | OUTPATIENT
Start: 2017-04-04 | End: 2017-04-04

## 2017-04-04 RX ORDER — ACETAMINOPHEN 500 MG
650 TABLET ORAL EVERY 6 HOURS
Qty: 0 | Refills: 0 | Status: DISCONTINUED | OUTPATIENT
Start: 2017-04-04 | End: 2017-04-11

## 2017-04-04 RX ORDER — METOPROLOL TARTRATE 50 MG
25 TABLET ORAL
Qty: 0 | Refills: 0 | Status: DISCONTINUED | OUTPATIENT
Start: 2017-04-04 | End: 2017-04-05

## 2017-04-04 RX ORDER — CHLORHEXIDINE GLUCONATE 213 G/1000ML
1 SOLUTION TOPICAL DAILY
Qty: 0 | Refills: 0 | Status: DISCONTINUED | OUTPATIENT
Start: 2017-04-04 | End: 2017-04-06

## 2017-04-04 RX ADMIN — HEPARIN SODIUM 0 UNIT(S)/HR: 5000 INJECTION INTRAVENOUS; SUBCUTANEOUS at 01:08

## 2017-04-04 RX ADMIN — CHLORHEXIDINE GLUCONATE 1 APPLICATION(S): 213 SOLUTION TOPICAL at 11:45

## 2017-04-04 RX ADMIN — Medication 5 MILLIGRAM(S): at 17:45

## 2017-04-04 RX ADMIN — Medication 650 MILLIGRAM(S): at 05:49

## 2017-04-04 RX ADMIN — PROPOFOL 3.55 MICROGRAM(S)/KG/MIN: 10 INJECTION, EMULSION INTRAVENOUS at 09:43

## 2017-04-04 RX ADMIN — CHLORHEXIDINE GLUCONATE 15 MILLILITER(S): 213 SOLUTION TOPICAL at 05:04

## 2017-04-04 RX ADMIN — ATORVASTATIN CALCIUM 40 MILLIGRAM(S): 80 TABLET, FILM COATED ORAL at 21:34

## 2017-04-04 RX ADMIN — PANTOPRAZOLE SODIUM 40 MILLIGRAM(S): 20 TABLET, DELAYED RELEASE ORAL at 11:44

## 2017-04-04 RX ADMIN — PIPERACILLIN AND TAZOBACTAM 25 GRAM(S): 4; .5 INJECTION, POWDER, LYOPHILIZED, FOR SOLUTION INTRAVENOUS at 05:05

## 2017-04-04 RX ADMIN — CLOPIDOGREL BISULFATE 75 MILLIGRAM(S): 75 TABLET, FILM COATED ORAL at 11:44

## 2017-04-04 RX ADMIN — Medication 81 MILLIGRAM(S): at 11:44

## 2017-04-04 RX ADMIN — HEPARIN SODIUM 600 UNIT(S)/HR: 5000 INJECTION INTRAVENOUS; SUBCUTANEOUS at 17:20

## 2017-04-04 RX ADMIN — Medication 2: at 18:06

## 2017-04-04 RX ADMIN — Medication 4: at 05:04

## 2017-04-04 RX ADMIN — INSULIN GLARGINE 6 UNIT(S): 100 INJECTION, SOLUTION SUBCUTANEOUS at 22:00

## 2017-04-04 RX ADMIN — HEPARIN SODIUM 600 UNIT(S)/HR: 5000 INJECTION INTRAVENOUS; SUBCUTANEOUS at 10:15

## 2017-04-04 RX ADMIN — PROPOFOL 3.55 MICROGRAM(S)/KG/MIN: 10 INJECTION, EMULSION INTRAVENOUS at 16:25

## 2017-04-04 RX ADMIN — HEPARIN SODIUM 700 UNIT(S)/HR: 5000 INJECTION INTRAVENOUS; SUBCUTANEOUS at 02:16

## 2017-04-04 RX ADMIN — ERYTHROPOIETIN 10000 UNIT(S): 10000 INJECTION, SOLUTION INTRAVENOUS; SUBCUTANEOUS at 12:43

## 2017-04-04 RX ADMIN — Medication 4: at 11:44

## 2017-04-04 RX ADMIN — Medication 100 MILLIGRAM(S): at 18:00

## 2017-04-04 RX ADMIN — PIPERACILLIN AND TAZOBACTAM 25 GRAM(S): 4; .5 INJECTION, POWDER, LYOPHILIZED, FOR SOLUTION INTRAVENOUS at 19:11

## 2017-04-04 RX ADMIN — HEPARIN SODIUM 0 UNIT(S)/HR: 5000 INJECTION INTRAVENOUS; SUBCUTANEOUS at 09:39

## 2017-04-04 RX ADMIN — PHENYLEPHRINE HYDROCHLORIDE 2.22 MICROGRAM(S)/KG/MIN: 10 INJECTION INTRAVENOUS at 05:32

## 2017-04-04 NOTE — PROGRESS NOTE ADULT - SUBJECTIVE AND OBJECTIVE BOX
HPI:  Pt. is a 85 y/o female w/pmhx of HTN, DM-2 (on lantus 16u qhs), Seizure d/o (not taking keppra), ESRD on HD m,w,f was in [facility] till friday while in HD pt pulled catheter and later was told there was fistula clot, went to vascular yesterday andafte had full HD, son states after HD pt felt weak. states she was just not herself and brings her in, no seizure like activity noted, no cp, palitations n/v/d/c no fever or chills. (02 Apr 2017 05:01)    Over 24 Hrs: remains intubated, on heparin drip, trops trending down.    PAST MEDICAL & SURGICAL HISTORY:  LBBB (left bundle branch block)  Seizure  H/O: Hypothyroidism  Hypertension  Acute Renal Insufficiency  CHF (Congestive Heart Failure)  Diabetes Mellitus  Dyslipidemia  S/P cardiac cath: 1 stent 10 years ago  Diabetes  FH: HTN (hypertension)  Acute CHF  No Past Surgical History      ## ROS: Unable to obtain      ## O/E:  Vitals: T(C): 38.1, Max: 38.1 (04-04 @ 05:00)  HR: 95 (51 - 95)  BP: 173/121 (97/33 - 173/121)  BP(mean): 130 (50 - 130)  RR: 18 (12 - 22)  SpO2: 100% (30% - 100%)  Wt(kg): --  Gen: lying comfortably in bed intubated  HEENT: PERRL  Resp: CTA B/L no c/r/w  CVS: S1S2 no m/r/g  Abd: soft NT/ND +BS  Ext: no c/c/e  Neuro: sedated    I & Os for 24h ending 04-04 @ 07:00  =============================================  IN: 1163 ml / OUT: 200 ml / NET: 963 ml    I & Os for current day (as of 04-04 @ 11:10)  =============================================  IN: 150.5 ml / OUT: 0 ml / NET: 150.5 ml    Mode: CPAP with PS, FiO2: 30, PEEP: 5, PS: 10    ## Labs:                        8.1    10.7  )-----------( 233      ( 04 Apr 2017 03:30 )             25.6     04 Apr 2017 03:30    142    |  102    |  45     ----------------------------<  99     4.4     |  23     |  6.23     Ca    7.9        04 Apr 2017 03:30  Phos  4.9       04 Apr 2017 03:30  Mg     2.7       04 Apr 2017 03:30    TPro  5.9    /  Alb  2.1    /  TBili  0.3    /  DBili  x      /  AST  242    /  ALT  117    /  AlkPhos  62     04 Apr 2017 03:30    PTT - ( 04 Apr 2017 09:10 )  PTT:86.7 sec  ABG - ( 04 Apr 2017 08:57 )  pH: x     /  pCO2: 40    /  pO2: 112   / HCO3: 25    / Base Excess: 0.6   /  SaO2: 95                CARDIAC MARKERS ( 04 Apr 2017 03:30 )  4.510 ng/mL / x     / 60 U/L / x     / 3.2 ng/mL  CARDIAC MARKERS ( 03 Apr 2017 17:57 )  5.190 ng/mL / x     / 90 U/L / x     / 5.7 ng/mL  CARDIAC MARKERS ( 03 Apr 2017 08:24 )  3.640 ng/mL / x     / 121 U/L / x     / 8.2 ng/mL  CARDIAC MARKERS ( 02 Apr 2017 23:05 )  1.130 ng/mL / x     / 129 U/L / x     / 5.3 ng/mL        ## Imaging: reviewed    MEDICATIONS  (STANDING):  piperacillin/tazobactam IVPB. 3.375Gram(s) IV Intermittent every 12 hours  atorvastatin 40milliGRAM(s) Oral at bedtime  clopidogrel Tablet 75milliGRAM(s) Oral daily  insulin glargine Injectable (LANTUS) 6Unit(s) SubCutaneous at bedtime  dextrose 5%. 1000milliLiter(s) IV Continuous <Continuous>  dextrose 50% Injectable 12.5Gram(s) IV Push once  dextrose 50% Injectable 25Gram(s) IV Push once  dextrose 50% Injectable 25Gram(s) IV Push once  propofol Infusion 10MICROgram(s)/kG/Min IV Continuous <Continuous>  dexmedetomidine Infusion 0.2MICROgram(s)/kG/Hr IV Continuous <Continuous>  aspirin  chewable 81milliGRAM(s) Oral daily  insulin lispro (HumaLOG) corrective regimen sliding scale  SubCutaneous every 6 hours  pantoprazole  Injectable 40milliGRAM(s) IV Push daily  chlorhexidine 0.12% Liquid 15milliLiter(s) Swish and Spit two times a day  chlorhexidine 4% Liquid 1Application(s) Topical daily  phenylephrine    Infusion 0.1MICROgram(s)/kG/Min IV Continuous <Continuous>  heparin  Infusion. Unit(s)/Hr IV Continuous <Continuous>  LORazepam   Injectable 1milliGRAM(s) IV Push once    MEDICATIONS  (PRN):  dextrose Gel 1Dose(s) Oral once PRN Blood Glucose LESS THAN 70 milliGRAM(s)/deciliter  glucagon  Injectable 1milliGRAM(s) IntraMuscular once PRN Glucose LESS THAN 70 milligrams/deciliter  heparin  Injectable 3500Unit(s) IV Push every 6 hours PRN For aPTT less than 40  acetaminophen   Tablet 650milliGRAM(s) Oral every 6 hours PRN For Temp greater than 38 C (100.4 F)      ## Code status:  Goals of care discussion: [x] yes [ ] no  [x] full code  [ ] DNR  [ ] DNI  [ ] STERLING

## 2017-04-04 NOTE — PROGRESS NOTE ADULT - SUBJECTIVE AND OBJECTIVE BOX
Patient seen in follow up for ESRD; off sedation, agitation;       MEDICATIONS  (STANDING):  piperacillin/tazobactam IVPB. 3.375Gram(s) IV Intermittent every 12 hours  atorvastatin 40milliGRAM(s) Oral at bedtime  clopidogrel Tablet 75milliGRAM(s) Oral daily  insulin glargine Injectable (LANTUS) 6Unit(s) SubCutaneous at bedtime  dextrose 5%. 1000milliLiter(s) IV Continuous <Continuous>  dextrose 50% Injectable 12.5Gram(s) IV Push once  dextrose 50% Injectable 25Gram(s) IV Push once  dextrose 50% Injectable 25Gram(s) IV Push once  propofol Infusion 10MICROgram(s)/kG/Min IV Continuous <Continuous>  dexmedetomidine Infusion 0.2MICROgram(s)/kG/Hr IV Continuous <Continuous>  aspirin  chewable 81milliGRAM(s) Oral daily  insulin lispro (HumaLOG) corrective regimen sliding scale  SubCutaneous every 6 hours  pantoprazole  Injectable 40milliGRAM(s) IV Push daily  chlorhexidine 0.12% Liquid 15milliLiter(s) Swish and Spit two times a day  chlorhexidine 4% Liquid 1Application(s) Topical daily  phenylephrine    Infusion 0.1MICROgram(s)/kG/Min IV Continuous <Continuous>  heparin  Infusion. Unit(s)/Hr IV Continuous <Continuous>  LORazepam   Injectable 1milliGRAM(s) IV Push once    MEDICATIONS  (PRN):  dextrose Gel 1Dose(s) Oral once PRN Blood Glucose LESS THAN 70 milliGRAM(s)/deciliter  glucagon  Injectable 1milliGRAM(s) IntraMuscular once PRN Glucose LESS THAN 70 milligrams/deciliter  heparin  Injectable 3500Unit(s) IV Push every 6 hours PRN For aPTT less than 40  acetaminophen   Tablet 650milliGRAM(s) Oral every 6 hours PRN For Temp greater than 38 C (100.4 F)    PHYSICAL EXAM:      T(C): 38.1, Max: 38.1 (04-04 @ 05:00)  HR: 95 (51 - 95)  BP: 173/121 (97/33 - 173/121)  RR: 18 (12 - 22)  SpO2: 100% (30% - 100%)  Wt(kg): --  Respiratory: clear anteriorly, decreased BS at bases  Cardiovascular: S1 S2  Gastrointestinal: soft NT ND +BS  Extremities:  tr  edema                                    8.1    10.7  )-----------( 233      ( 04 Apr 2017 03:30 )             25.6     04 Apr 2017 03:30    142    |  102    |  45     ----------------------------<  99     4.4     |  23     |  6.23     Ca    7.9        04 Apr 2017 03:30  Phos  4.9       04 Apr 2017 03:30  Mg     2.7       04 Apr 2017 03:30    TPro  5.9    /  Alb  2.1    /  TBili  0.3    /  DBili  x      /  AST  242    /  ALT  117    /  AlkPhos  62     04 Apr 2017 03:30    ABG - ( 04 Apr 2017 08:57 )  pH: x     /  pCO2: 40    /  pO2: 112   / HCO3: 25    / Base Excess: 0.6   /  SaO2: 95                LIVER FUNCTIONS - ( 04 Apr 2017 03:30 )  Alb: 2.1 g/dL / Pro: 5.9 gm/dL / ALK PHOS: 62 U/L / ALT: 117 U/L / AST: 242 U/L / GGT: x             Assessment and Plan:    ESRD, sepsis, PNA; elevated troponin;  For HD today;  Discussed with daughter.

## 2017-04-04 NOTE — PROGRESS NOTE ADULT - SUBJECTIVE AND OBJECTIVE BOX
Assessment:  Septic Shock, hypotension respiratory failure on vent support  NSTEMI in setting of ESRD and sepsis, with a history of CAD and 2 stents in the past, DM  Medical management ongoing, ASA, Plavix, Heparin IV  Statin if not contraindicated  IV ABX  Supportive care.   Echocardiogram reviewed  EKG reviewed and compared with previous from last hospital admission here, no acute changes noted.   Off pressors  CE trending down  Discussed in detail with family.

## 2017-04-04 NOTE — PROGRESS NOTE ADULT - ASSESSMENT
86F PMH HTN, hyperlipidemia, CAD s/p stents to LAD and RCA, CHF, DM-2 (on lantus 16u qhs), CVA, Seizure d/o (not taking keppra), dementia, hx of GI bleed, hypothyroidism, ESRD on HD m,w,f presents to hospital via ambulance for change in mental status.  Here in ER found to have leukocytosis WBC 17.8. Rapid response on medical floor for respiratory distress, pulmonary edema. Troponin elevated sec to NSTEMI.  Transferred to ICU, failed BiPAP. Intubated.     1. PULM  - acute respiratory failure due to acute pulmonary edema sec to NSTEMI  - daily weaning and sedation vacation  - cont broad spectrum antibiotics, check sputum culture    2. CV  - NSTEMI, troponin peaked 5.19, now trending down  - cont asa and plavix  - d/c heparin gtt after 48 hrs  - off pressors now  - 2D echo with EF 45-50%, global CM  - fup cardiology     3. Renal  - ESRD on HD  - HD today, as per renal    4. GEN  - condition d/w with son and daughter in detail

## 2017-04-05 DIAGNOSIS — I50.9 HEART FAILURE, UNSPECIFIED: ICD-10-CM

## 2017-04-05 DIAGNOSIS — E11.9 TYPE 2 DIABETES MELLITUS WITHOUT COMPLICATIONS: ICD-10-CM

## 2017-04-05 LAB
ANION GAP SERPL CALC-SCNC: 11 MMOL/L — SIGNIFICANT CHANGE UP (ref 5–17)
APTT BLD: 44.9 SEC — HIGH (ref 27.5–37.4)
APTT BLD: 72.8 SEC — HIGH (ref 27.5–37.4)
BUN SERPL-MCNC: 15 MG/DL — SIGNIFICANT CHANGE UP (ref 7–23)
CALCIUM SERPL-MCNC: 8 MG/DL — LOW (ref 8.5–10.1)
CHLORIDE SERPL-SCNC: 105 MMOL/L — SIGNIFICANT CHANGE UP (ref 96–108)
CO2 SERPL-SCNC: 29 MMOL/L — SIGNIFICANT CHANGE UP (ref 22–31)
CREAT SERPL-MCNC: 3.29 MG/DL — HIGH (ref 0.5–1.3)
CULTURE RESULTS: NO GROWTH — SIGNIFICANT CHANGE UP
GLUCOSE SERPL-MCNC: 122 MG/DL — HIGH (ref 70–99)
GRAM STN FLD: SIGNIFICANT CHANGE UP
HAV IGM SER-ACNC: SIGNIFICANT CHANGE UP
HBV CORE IGM SER-ACNC: SIGNIFICANT CHANGE UP
HBV SURFACE AB SER-ACNC: <3 MIU/ML — LOW
HBV SURFACE AG SER-ACNC: SIGNIFICANT CHANGE UP
HCT VFR BLD CALC: 26 % — LOW (ref 34.5–45)
HCT VFR BLD CALC: 26.5 % — LOW (ref 34.5–45)
HCV AB S/CO SERPL IA: 0.14 S/CO — SIGNIFICANT CHANGE UP
HCV AB SERPL-IMP: SIGNIFICANT CHANGE UP
HGB BLD-MCNC: 7.9 G/DL — LOW (ref 11.5–15.5)
HGB BLD-MCNC: 8.4 G/DL — LOW (ref 11.5–15.5)
MAGNESIUM SERPL-MCNC: 2.4 MG/DL — SIGNIFICANT CHANGE UP (ref 1.8–2.4)
MCHC RBC-ENTMCNC: 26.9 PG — LOW (ref 27–34)
MCHC RBC-ENTMCNC: 28 PG — SIGNIFICANT CHANGE UP (ref 27–34)
MCHC RBC-ENTMCNC: 30.4 GM/DL — LOW (ref 32–36)
MCHC RBC-ENTMCNC: 31.6 GM/DL — LOW (ref 32–36)
MCV RBC AUTO: 88.6 FL — SIGNIFICANT CHANGE UP (ref 80–100)
MCV RBC AUTO: 88.8 FL — SIGNIFICANT CHANGE UP (ref 80–100)
PHOSPHATE SERPL-MCNC: 2.6 MG/DL — SIGNIFICANT CHANGE UP (ref 2.5–4.5)
PLATELET # BLD AUTO: 252 K/UL — SIGNIFICANT CHANGE UP (ref 150–400)
PLATELET # BLD AUTO: 257 K/UL — SIGNIFICANT CHANGE UP (ref 150–400)
POTASSIUM SERPL-MCNC: 3.3 MMOL/L — LOW (ref 3.5–5.3)
POTASSIUM SERPL-SCNC: 3.3 MMOL/L — LOW (ref 3.5–5.3)
RBC # BLD: 2.93 M/UL — LOW (ref 3.8–5.2)
RBC # BLD: 2.99 M/UL — LOW (ref 3.8–5.2)
RBC # FLD: 19.8 % — HIGH (ref 11–15)
RBC # FLD: 19.9 % — HIGH (ref 11–15)
SODIUM SERPL-SCNC: 145 MMOL/L — SIGNIFICANT CHANGE UP (ref 135–145)
SPECIMEN SOURCE: SIGNIFICANT CHANGE UP
VANCOMYCIN TROUGH SERPL-MCNC: 20.8 UG/ML — HIGH (ref 10–20)
WBC # BLD: 8.8 K/UL — SIGNIFICANT CHANGE UP (ref 3.8–10.5)
WBC # BLD: 9 K/UL — SIGNIFICANT CHANGE UP (ref 3.8–10.5)
WBC # FLD AUTO: 8.8 K/UL — SIGNIFICANT CHANGE UP (ref 3.8–10.5)
WBC # FLD AUTO: 9 K/UL — SIGNIFICANT CHANGE UP (ref 3.8–10.5)

## 2017-04-05 PROCEDURE — 71010: CPT | Mod: 26

## 2017-04-05 RX ORDER — INSULIN LISPRO 100/ML
VIAL (ML) SUBCUTANEOUS
Qty: 0 | Refills: 0 | Status: DISCONTINUED | OUTPATIENT
Start: 2017-04-05 | End: 2017-04-11

## 2017-04-05 RX ORDER — METOPROLOL TARTRATE 50 MG
25 TABLET ORAL
Qty: 0 | Refills: 0 | Status: DISCONTINUED | OUTPATIENT
Start: 2017-04-05 | End: 2017-04-10

## 2017-04-05 RX ADMIN — CHLORHEXIDINE GLUCONATE 1 APPLICATION(S): 213 SOLUTION TOPICAL at 11:32

## 2017-04-05 RX ADMIN — HEPARIN SODIUM 700 UNIT(S)/HR: 5000 INJECTION INTRAVENOUS; SUBCUTANEOUS at 09:00

## 2017-04-05 RX ADMIN — CLOPIDOGREL BISULFATE 75 MILLIGRAM(S): 75 TABLET, FILM COATED ORAL at 11:33

## 2017-04-05 RX ADMIN — Medication 2: at 21:14

## 2017-04-05 RX ADMIN — INSULIN GLARGINE 6 UNIT(S): 100 INJECTION, SOLUTION SUBCUTANEOUS at 21:07

## 2017-04-05 RX ADMIN — Medication 81 MILLIGRAM(S): at 11:33

## 2017-04-05 RX ADMIN — ATORVASTATIN CALCIUM 40 MILLIGRAM(S): 80 TABLET, FILM COATED ORAL at 21:06

## 2017-04-05 RX ADMIN — HEPARIN SODIUM 700 UNIT(S)/HR: 5000 INJECTION INTRAVENOUS; SUBCUTANEOUS at 02:30

## 2017-04-05 RX ADMIN — PIPERACILLIN AND TAZOBACTAM 25 GRAM(S): 4; .5 INJECTION, POWDER, LYOPHILIZED, FOR SOLUTION INTRAVENOUS at 17:29

## 2017-04-05 RX ADMIN — PIPERACILLIN AND TAZOBACTAM 25 GRAM(S): 4; .5 INJECTION, POWDER, LYOPHILIZED, FOR SOLUTION INTRAVENOUS at 05:55

## 2017-04-05 RX ADMIN — Medication 650 MILLIGRAM(S): at 10:26

## 2017-04-05 RX ADMIN — Medication 25 MILLIGRAM(S): at 05:54

## 2017-04-05 RX ADMIN — Medication 25 MILLIGRAM(S): at 17:29

## 2017-04-05 RX ADMIN — Medication 2: at 17:29

## 2017-04-05 NOTE — CONSULT NOTE ADULT - SUBJECTIVE AND OBJECTIVE BOX
HPI:  Pt. is a 85 y/o female w/pmhx of HTN, DM-2 (on lantus 16u qhs), Seizure d/o (not taking keppra), ESRD on HD m,w,f was in [facility] till friday while in HD pt pulled catheter and later was told there was fistula clot, went to vascular yesterday andafte had full HD, son states after HD pt felt weak. states she was just not herself and brings her in, no seizure like activity noted, no cp, palitations n/v/d/c no fever or chills. (02 Apr 2017 05:01)  here ac resp failure intubated extubated     PAST MEDICAL & SURGICAL HISTORY:  LBBB (left bundle branch block)  Seizure  H/O: Hypothyroidism  Hypertension  Acute Renal Insufficiency  CHF (Congestive Heart Failure)  Diabetes Mellitus  Dyslipidemia  S/P cardiac cath: 1 stent 10 years ago  Diabetes  FH: HTN (hypertension)  Acute CHF  No Past Surgical History      SOCHX:   tobacco,  -  alcohol    FMHX: FA/MO  - contributory       Recent Travel:    Immunizations:    Allergies    No Known Allergies    Intolerances        MEDICATIONS  (STANDING):  piperacillin/tazobactam IVPB. 3.375Gram(s) IV Intermittent every 12 hours  atorvastatin 40milliGRAM(s) Oral at bedtime  clopidogrel Tablet 75milliGRAM(s) Oral daily  insulin glargine Injectable (LANTUS) 6Unit(s) SubCutaneous at bedtime  dextrose 5%. 1000milliLiter(s) IV Continuous <Continuous>  dextrose 50% Injectable 12.5Gram(s) IV Push once  dextrose 50% Injectable 25Gram(s) IV Push once  dextrose 50% Injectable 25Gram(s) IV Push once  aspirin  chewable 81milliGRAM(s) Oral daily  chlorhexidine 4% Liquid 1Application(s) Topical daily  insulin lispro (HumaLOG) corrective regimen sliding scale  SubCutaneous Before meals and at bedtime  metoprolol 25milliGRAM(s) Oral two times a day    MEDICATIONS  (PRN):  dextrose Gel 1Dose(s) Oral once PRN Blood Glucose LESS THAN 70 milliGRAM(s)/deciliter  acetaminophen   Tablet 650milliGRAM(s) Oral every 6 hours PRN For Temp greater than 38 C (100.4 F)      REVIEW OF SYSTEMS:  CONSTITUTIONAL: No fever, weight loss, or fatigue  EYES: No eye pain, visual disturbances, or discharge  ENMT:  No difficulty hearing, tinnitus, vertigo; No sinus or throat pain  NECK: No pain or stiffness  BREASTS: No pain, masses, or nipple discharge  RESPIRATORY: No cough, wheezing, chills or hemoptysis; No shortness of breath  CARDIOVASCULAR: No chest pain, palpitations, dizziness, or leg swelling  GASTROINTESTINAL: No abdominal or epigastric pain. No nausea, vomiting, or hematemesis; No diarrhea or constipation. No melena or hematochezia.  GENITOURINARY: No dysuria, frequency, hematuria, or incontinence  NEUROLOGICAL: No headaches, memory loss, loss of strength, numbness, or tremors  SKIN: No itching, burning, rashes, or lesions   LYMPH NODES: No enlarged glands  ENDOCRINE: No heat or cold intolerance; No hair loss  MUSCULOSKELETAL: No joint pain or swelling; No muscle, back, or extremity pain  PSYCHIATRIC: No depression, anxiety, mood swings, or difficulty sleeping  HEME/LYMPH: No easy bruising, or bleeding gums  ALLERGY AND IMMUNOLOGIC: No hives or eczema    VITAL SIGNS:    T(C): 36.9, Max: 37.7 (04-05 @ 04:30)  T(F): 98.4, Max: 99.8 (04-05 @ 04:30)  HR: 65 (59 - 100)  BP: 138/38 (101/81 - 193/167)  RR: 17 (13 - 31)  SpO2: 95% (39% - 100%)  Wt(kg): --    PHYSICAL EXAM:    GENERAL: NAD, well-groomed, well-developed  HEAD:  Atraumatic, Normocephalic  EYES: EOMI, PERRLA, conjunctiva and sclera clear  ENMT: No tonsillar erythema, exudates, or enlargement; Moist mucous membranes, Good dentition, No lesions  NECK: Supple, No JVD, Normal thyroid  NERVOUS SYSTEM:  Alert & Oriented X3, Good concentration; Motor Strength 5/5 B/L upper and lower extremities; DTRs 2+ intact and symmetric  CHEST/LUNG: Clear to percussion bilaterally; No rales, rhonchi, wheezing bilaterally  HEART: Regular rate and rhythm; No murmurs, rubs, or gallops  ABDOMEN: Soft, Nontender, Nondistended; Bowel sounds present  EXTREMITIES:  2+ Peripheral Pulses, No clubbing, cyanosis, or edema  LYMPH: No lymphadenopathy noted  SKIN: No rashes or lesions  BACK: no pressor sore     LABS:                         8.4    8.8   )-----------( 257      ( 05 Apr 2017 11:09 )             26.5     04-05    145  |  105  |  15  ----------------------------<  122<H>  3.3<L>   |  29  |  3.29<H>    Ca    8.0<L>      05 Apr 2017 03:44  Phos  2.6     04-05  Mg     2.4     04-05    TPro  5.9<L>  /  Alb  2.1<L>  /  TBili  0.3  /  DBili  x   /  AST  242<H>  /  ALT  117<H>  /  AlkPhos  62  04-04    LIVER FUNCTIONS - ( 04 Apr 2017 03:30 )  Alb: 2.1 g/dL / Pro: 5.9 gm/dL / ALK PHOS: 62 U/L / ALT: 117 U/L / AST: 242 U/L / GGT: x           PTT - ( 05 Apr 2017 08:29 )  PTT:72.8 sec    ABG - ( 04 Apr 2017 17:33 )  pH: x     /  pCO2: 38    /  pO2: 120   / HCO3: 30    / Base Excess: 6.8   /  SaO2: 96                CARDIAC MARKERS ( 04 Apr 2017 03:30 )  4.510 ng/mL / x     / 60 U/L / x     / 3.2 ng/mL      Thyroid Stimulating Hormone, Serum: <0.005 uU/mL (04-02 @ 08:18)          Vancomycin Level, Trough: 20.8 ug/mL (04-05 @ 03:44)        Culture Results:   No growth (04-03 @ 08:24)  Culture Results:   No growth to date. (04-02 @ 10:21)  Culture Results:   No growth to date. (04-02 @ 10:21)  Culture Results:   No growth (04-02 @ 10:15)                Radiology:    HPI:  Pt. is a 85 y/o female w/pmhx of HTN, DM-2 (on lantus 16u qhs), Seizure d/o (not taking keppra), ESRD on HD m,w,f was in [facility] till friday while in HD pt pulled catheter and later was told there was fistula clot, went to vascular yesterday andafte had full HD, son states after HD pt felt weak. states she was just not herself and brings her in, no seizure like activity noted, no cp, palitations n/v/d/c no fever or chills. (02 Apr 2017 05:01)      PAST MEDICAL & SURGICAL HISTORY:  LBBB (left bundle branch block)  Seizure  H/O: Hypothyroidism  Hypertension  Acute Renal Insufficiency  CHF (Congestive Heart Failure)  Diabetes Mellitus  Dyslipidemia  S/P cardiac cath: 1 stent 10 years ago  Diabetes  FH: HTN (hypertension)  Acute CHF  No Past Surgical History      SOCHX:   tobacco,  -  alcohol    FMHX: FA/MO  - contributory       Recent Travel:    Immunizations:    Allergies    No Known Allergies    Intolerances        MEDICATIONS  (STANDING):  piperacillin/tazobactam IVPB. 3.375Gram(s) IV Intermittent every 12 hours  atorvastatin 40milliGRAM(s) Oral at bedtime  clopidogrel Tablet 75milliGRAM(s) Oral daily  insulin glargine Injectable (LANTUS) 6Unit(s) SubCutaneous at bedtime  dextrose 5%. 1000milliLiter(s) IV Continuous <Continuous>  dextrose 50% Injectable 12.5Gram(s) IV Push once  dextrose 50% Injectable 25Gram(s) IV Push once  dextrose 50% Injectable 25Gram(s) IV Push once  aspirin  chewable 81milliGRAM(s) Oral daily  chlorhexidine 4% Liquid 1Application(s) Topical daily  insulin lispro (HumaLOG) corrective regimen sliding scale  SubCutaneous Before meals and at bedtime  metoprolol 25milliGRAM(s) Oral two times a day    MEDICATIONS  (PRN):  dextrose Gel 1Dose(s) Oral once PRN Blood Glucose LESS THAN 70 milliGRAM(s)/deciliter  acetaminophen   Tablet 650milliGRAM(s) Oral every 6 hours PRN For Temp greater than 38 C (100.4 F)      REVIEW OF SYSTEMS:  CONSTITUTIONAL: No fever, weight loss, or fatigue  EYES: No eye pain, visual disturbances, or discharge  ENMT:  No difficulty hearing, tinnitus, vertigo; No sinus or throat pain  NECK: No pain or stiffness  BREASTS: No pain, masses, or nipple discharge  RESPIRATORY: No cough, wheezing, chills or hemoptysis; No shortness of breath  CARDIOVASCULAR: No chest pain, palpitations, dizziness, or leg swelling  GASTROINTESTINAL: No abdominal or epigastric pain. No nausea, vomiting, or hematemesis; No diarrhea or constipation. No melena or hematochezia.  GENITOURINARY: No dysuria, frequency, hematuria, or incontinence  NEUROLOGICAL: No headaches, memory loss, loss of strength, numbness, or tremors  SKIN: No itching, burning, rashes, or lesions   LYMPH NODES: No enlarged glands  ENDOCRINE: No heat or cold intolerance; No hair loss  MUSCULOSKELETAL: No joint pain or swelling; No muscle, back, or extremity pain  PSYCHIATRIC: No depression, anxiety, mood swings, or difficulty sleeping  HEME/LYMPH: No easy bruising, or bleeding gums  ALLERGY AND IMMUNOLOGIC: No hives or eczema    VITAL SIGNS:    T(C): 36.9, Max: 37.7 (04-05 @ 04:30)  T(F): 98.4, Max: 99.8 (04-05 @ 04:30)  HR: 65 (59 - 100)  BP: 138/38 (101/81 - 193/167)  RR: 17 (13 - 31)  SpO2: 95% (39% - 100%)  Wt(kg): --    PHYSICAL EXAM:    GENERAL: NAD, well-groomed, well-developed  HEAD:  Atraumatic, Normocephalic  EYES: EOMI, PERRLA, conjunctiva and sclera clear  ENMT: No tonsillar erythema, exudates, or enlargement; Moist mucous membranes, Good dentition, No lesions  NECK: Supple, No JVD, Normal thyroid  NERVOUS SYSTEM:  Alert & Oriented X3, Good concentration; Motor Strength 5/5 B/L upper and lower extremities; DTRs 2+ intact and symmetric  CHEST/LUNG: Clear to percussion bilaterally; No rales, rhonchi, wheezing bilaterally  HEART: Regular rate and rhythm; No murmurs, rubs, or gallops  ABDOMEN: Soft, Nontender, Nondistended; Bowel sounds present  EXTREMITIES:  2+ Peripheral Pulses, No clubbing, cyanosis, or edema  LYMPH: No lymphadenopathy noted  SKIN: No rashes or lesions  BACK: no pressor sore     LABS:                         8.4    8.8   )-----------( 257      ( 05 Apr 2017 11:09 )             26.5     04-05    145  |  105  |  15  ----------------------------<  122<H>  3.3<L>   |  29  |  3.29<H>    Ca    8.0<L>      05 Apr 2017 03:44  Phos  2.6     04-05  Mg     2.4     04-05    TPro  5.9<L>  /  Alb  2.1<L>  /  TBili  0.3  /  DBili  x   /  AST  242<H>  /  ALT  117<H>  /  AlkPhos  62  04-04    LIVER FUNCTIONS - ( 04 Apr 2017 03:30 )  Alb: 2.1 g/dL / Pro: 5.9 gm/dL / ALK PHOS: 62 U/L / ALT: 117 U/L / AST: 242 U/L / GGT: x           MPRESSION:   Improvement in the bilateral lung patchy opacities c/w 4/4/17 exam.  Prior ET and NG tubes have been withdrawn.  PTT - ( 05 Apr 2017 08:29 )  PTT:72.8 sec    ABG - ( 04 Apr 2017 17:33 )  pH: x     /  pCO2: 38    /  pO2: 120   / HCO3: 30    / Base Excess: 6.8   /  SaO2: 96                CARDIAC MARKERS ( 04 Apr 2017 03:30 )  4.510 ng/mL / x     / 60 U/L / x     / 3.2 ng/mL      Thyroid Stimulating Hormone, Serum: <0.005 uU/mL (04-02 @ 08:18)          Vancomycin Level, Trough: 20.8 ug/mL (04-05 @ 03:44)        Culture Results:   No growth (04-03 @ 08:24)  Culture Results:   No growth to date. (04-02 @ 10:21)  Culture Results:   No growth to date. (04-02 @ 10:21)  Culture Results:   No growth (04-02 @ 10:15)                Radiology: HPI:  Pt. is a 87 y/o female w/pmhx of HTN, DM-2 (on lantus 16u qhs), Seizure d/o (not taking keppra), ESRD on HD m,w,f was in [facility] till friday while in HD pt pulled catheter and later was told there was fistula clot, went to vascular yesterday andafte had full HD, son states after HD pt felt weak. states she was just not herself and brings her in, no seizure like activity noted, no cp, palitations n/v/d/c no fever or chills. (02 Apr 2017 05:01)  here ac resp failure intubated extubated   very poor historian    PAST MEDICAL & SURGICAL HISTORY:  LBBB (left bundle branch block)  Seizure  H/O: Hypothyroidism  Hypertension  Acute Renal Insufficiency  CHF (Congestive Heart Failure)  Diabetes Mellitus  Dyslipidemia  S/P cardiac cath: 1 stent 10 years ago  Diabetes  FH: HTN (hypertension)  Acute CHF  No Past Surgical History      SOCHX    tobacco,  -  alcohol    FMHX: FA/MO  - contributory       Recent Travel:    Immunizations:    Allergies    No Known Allergies    Intolerances        MEDICATIONS  (STANDING):  piperacillin/tazobactam IVPB. 3.375Gram(s) IV Intermittent every 12 hours  atorvastatin 40milliGRAM(s) Oral at bedtime  clopidogrel Tablet 75milliGRAM(s) Oral daily  insulin glargine Injectable (LANTUS) 6Unit(s) SubCutaneous at bedtime  dextrose 5%. 1000milliLiter(s) IV Continuous <Continuous>  dextrose 50% Injectable 12.5Gram(s) IV Push once  dextrose 50% Injectable 25Gram(s) IV Push once  dextrose 50% Injectable 25Gram(s) IV Push once  aspirin  chewable 81milliGRAM(s) Oral daily  chlorhexidine 4% Liquid 1Application(s) Topical daily  insulin lispro (HumaLOG) corrective regimen sliding scale  SubCutaneous Before meals and at bedtime  metoprolol 25milliGRAM(s) Oral two times a day    MEDICATIONS  (PRN):  dextrose Gel 1Dose(s) Oral once PRN Blood Glucose LESS THAN 70 milliGRAM(s)/deciliter  acetaminophen   Tablet 650milliGRAM(s) Oral every 6 hours PRN For Temp greater than 38 C (100.4 F)      REVIEW OF SYSTEMS:  unable to obtain       VITAL SIGNS:    T(C): 36.9, Max: 37.7 (04-05 @ 04:30)  T(F): 98.4, Max: 99.8 (04-05 @ 04:30)  HR: 65 (59 - 100)  BP: 138/38 (101/81 - 193/167)  RR: 17 (13 - 31)  SpO2: 95% (39% - 100%)  Wt(kg): --    PHYSICAL EXAM:    GENERAL: NAD, well-groomed, well-developed  HEAD:  Atraumatic, Normocephalic  EYES: EOMI, PERRLA, conjunctiva and sclera clear  ENMT: No tonsillar erythema, exudates, or enlargement; Moist mucous membranes, Good dentition, No lesions  NECK: Supple, No JVD, Normal thyroid  NERVOUS SYSTEM:  Alert & Oriented X3, Good concentration; Motor Strength 5/5 B/L upper and lower extremities; DTRs 2+ intact and symmetric  CHEST/LUNG: Clear to percussion bilaterally; No rales, rhonchi, wheezing bilaterally  HEART: Regular rate and rhythm; No murmurs, rubs, or gallops  ABDOMEN: Soft, Nontender, Nondistended; Bowel sounds present  EXTREMITIES:  2+ Peripheral Pulses, No clubbing, cyanosis, or edema  LYMPH: No lymphadenopathy noted  SKIN: No rashes or lesions  BACK: no pressor sore   left arm avfistula etc     LABS:                         8.4    8.8   )-----------( 257      ( 05 Apr 2017 11:09 )             26.5     04-05    145  |  105  |  15  ----------------------------<  122<H>  3.3<L>   |  29  |  3.29<H>    Ca    8.0<L>      05 Apr 2017 03:44  Phos  2.6     04-05  Mg     2.4     04-05    TPro  5.9<L>  /  Alb  2.1<L>  /  TBili  0.3  /  DBili  x   /  AST  242<H>  /  ALT  117<H>  /  AlkPhos  62  04-04                      Radiology:

## 2017-04-05 NOTE — PHYSICAL THERAPY INITIAL EVALUATION ADULT - FOLLOWS COMMANDS/ANSWERS QUESTIONS, REHAB EVAL
manual guidance and visual demonstration required throughout for carryover/speech unintelligible/50% of the time

## 2017-04-05 NOTE — PHYSICAL THERAPY INITIAL EVALUATION ADULT - ACTIVE RANGE OF MOTION EXAMINATION, REHAB EVAL
bilateral lower extremity Active ROM was WNL (within normal limits)/ap. upper extremity Active ROM was WNL (within normal limits)

## 2017-04-05 NOTE — PROGRESS NOTE ADULT - SUBJECTIVE AND OBJECTIVE BOX
HPI:  Pt. is a 87 y/o female w/pmhx of HTN, DM-2 (on lantus 16u qhs), Seizure d/o (not taking keppra), ESRD on HD m,w,f was in [facility] till friday while in HD pt pulled catheter and later was told there was fistula clot, went to vascular yesterday andafte had full HD, son states after HD pt felt weak. states she was just not herself and brings her in, no seizure like activity noted, no cp, palitations n/v/d/c no fever or chills. (02 Apr 2017 05:01)    Over 24 Hrs: s/p extubation yesterday, doing well.    PAST MEDICAL & SURGICAL HISTORY:  LBBB (left bundle branch block)  Seizure  H/O: Hypothyroidism  Hypertension  Acute Renal Insufficiency  CHF (Congestive Heart Failure)  Diabetes Mellitus  Dyslipidemia  S/P cardiac cath: 1 stent 10 years ago  Diabetes  FH: HTN (hypertension)  Acute CHF  No Past Surgical History      ## ROS: Unable to obtain      ## O/E:  Vitals: T(C): 37.1, Max: 37.7 (04-04 @ 20:01)  HR: 73 (66 - 118)  BP: 138/76 (101/81 - 193/167)  BP(mean): 64 (48 - 172)  RR: 19 (11 - 52)  SpO2: 100% (39% - 100%)  Wt(kg): --  Gen: lying comfortably in bed in no apparent distress  HEENT: PERRL, EOMI  Resp: CTA B/L no c/r/w  CVS: S1S2 no m/r/g  Abd: soft NT/ND +BS  Ext: no c/c/e  Neuro: Awake and responsive    I & Os for 24h ending 04-05 @ 07:00  =============================================  IN: 894.8 ml / OUT: 2000 ml / NET: -1105.2 ml    I & Os for current day (as of 04-05 @ 10:59)  =============================================  IN: 14 ml / OUT: 0 ml / NET: 14 ml    Mode: CPAP with PS, FiO2: 30, PEEP: 5, PS: 5, MAP: 8, PIP: 15    ## Labs:                        7.9    9.0   )-----------( 252      ( 05 Apr 2017 03:44 )             26.0     04-05    145  |  105  |  15  ----------------------------<  122<H>  3.3<L>   |  29  |  3.29<H>    Ca    8.0<L>      05 Apr 2017 03:44  Phos  2.6     04-05  Mg     2.4     04-05    TPro  5.9<L>  /  Alb  2.1<L>  /  TBili  0.3  /  DBili  x   /  AST  242<H>  /  ALT  117<H>  /  AlkPhos  62  04-04    PTT - ( 05 Apr 2017 08:29 )  PTT:72.8 sec  ABG - ( 04 Apr 2017 17:33 )  pH: x     /  pCO2: 38    /  pO2: 120   / HCO3: 30    / Base Excess: 6.8   /  SaO2: 96                CARDIAC MARKERS ( 04 Apr 2017 03:30 )  4.510 ng/mL / x     / 60 U/L / x     / 3.2 ng/mL  CARDIAC MARKERS ( 03 Apr 2017 17:57 )  5.190 ng/mL / x     / 90 U/L / x     / 5.7 ng/mL        ## Imaging: reviewed    MEDICATIONS  (STANDING):  piperacillin/tazobactam IVPB. 3.375Gram(s) IV Intermittent every 12 hours  atorvastatin 40milliGRAM(s) Oral at bedtime  clopidogrel Tablet 75milliGRAM(s) Oral daily  insulin glargine Injectable (LANTUS) 6Unit(s) SubCutaneous at bedtime  dextrose 5%. 1000milliLiter(s) IV Continuous <Continuous>  dextrose 50% Injectable 12.5Gram(s) IV Push once  dextrose 50% Injectable 25Gram(s) IV Push once  dextrose 50% Injectable 25Gram(s) IV Push once  aspirin  chewable 81milliGRAM(s) Oral daily  chlorhexidine 4% Liquid 1Application(s) Topical daily  metoprolol 25milliGRAM(s) Oral two times a day  insulin lispro (HumaLOG) corrective regimen sliding scale  SubCutaneous Before meals and at bedtime    MEDICATIONS  (PRN):  dextrose Gel 1Dose(s) Oral once PRN Blood Glucose LESS THAN 70 milliGRAM(s)/deciliter  acetaminophen   Tablet 650milliGRAM(s) Oral every 6 hours PRN For Temp greater than 38 C (100.4 F)         ## Code status:  Goals of care discussion: [x] yes [ ] no  [x] full code  [ ] DNR  [ ] DNI  [ ] MOLST

## 2017-04-05 NOTE — PROGRESS NOTE ADULT - SUBJECTIVE AND OBJECTIVE BOX
Assessment:  Septic Shock, hypotension respiratory failure off vent support  NSTEMI in setting of ESRD and sepsis, with a history of CAD and 2 stents in the past, DM  Medical management ongoing, ASA, Plavix, Heparin IV  Statin if not contraindicated  IV ABX  Echocardiogram reviewed  EKG reviewed and compared with previous from last hospital admission here, no acute changes noted.   Off pressors  CE trending down  Off vent, doing well.   Heparin off today

## 2017-04-05 NOTE — PHYSICAL THERAPY INITIAL EVALUATION ADULT - GENERAL OBSERVATIONS, REHAB EVAL
Pt encountered supine in bed, alert, responds to name, acknowledging  by head movements, pt is verbal, speech unintelligible, NAD. Pt with flexiseal and cardiac monitor, IV disconnected by SRIKANTH Pinedo at start of session.

## 2017-04-05 NOTE — PROGRESS NOTE ADULT - SUBJECTIVE AND OBJECTIVE BOX
Patient seen in follow up for ESRD sitting in chair. Comfortable.    MEDICATIONS  (STANDING):  piperacillin/tazobactam IVPB. 3.375Gram(s) IV Intermittent every 12 hours  atorvastatin 40milliGRAM(s) Oral at bedtime  clopidogrel Tablet 75milliGRAM(s) Oral daily  insulin glargine Injectable (LANTUS) 6Unit(s) SubCutaneous at bedtime  dextrose 5%. 1000milliLiter(s) IV Continuous <Continuous>  dextrose 50% Injectable 12.5Gram(s) IV Push once  dextrose 50% Injectable 25Gram(s) IV Push once  dextrose 50% Injectable 25Gram(s) IV Push once  aspirin  chewable 81milliGRAM(s) Oral daily  chlorhexidine 4% Liquid 1Application(s) Topical daily  insulin lispro (HumaLOG) corrective regimen sliding scale  SubCutaneous Before meals and at bedtime  metoprolol 25milliGRAM(s) Oral two times a day    MEDICATIONS  (PRN):  dextrose Gel 1Dose(s) Oral once PRN Blood Glucose LESS THAN 70 milliGRAM(s)/deciliter  acetaminophen   Tablet 650milliGRAM(s) Oral every 6 hours PRN For Temp greater than 38 C (100.4 F)    PHYSICAL EXAM:      T(C): 36.9, Max: 37.7 (04-04 @ 20:01)  HR: 69 (59 - 118)  BP: 130/73 (101/81 - 193/167)  RR: 23 (13 - 52)  SpO2: 99% (39% - 100%)  Wt(kg): --  Respiratory: clear anteriorly, decreased BS at bases  Cardiovascular: S1 S2  Gastrointestinal: soft NT ND +BS  Extremities: 1 +  edema                                    8.4    8.8   )-----------( 257      ( 05 Apr 2017 11:09 )             26.5     04-05    145  |  105  |  15  ----------------------------<  122<H>  3.3<L>   |  29  |  3.29<H>    Ca    8.0<L>      05 Apr 2017 03:44  Phos  2.6     04-05  Mg     2.4     04-05    TPro  5.9<L>  /  Alb  2.1<L>  /  TBili  0.3  /  DBili  x   /  AST  242<H>  /  ALT  117<H>  /  AlkPhos  62  04-04    ABG - ( 04 Apr 2017 17:33 )  pH: x     /  pCO2: 38    /  pO2: 120   / HCO3: 30    / Base Excess: 6.8   /  SaO2: 96                LIVER FUNCTIONS - ( 04 Apr 2017 03:30 )  Alb: 2.1 g/dL / Pro: 5.9 gm/dL / ALK PHOS: 62 U/L / ALT: 117 U/L / AST: 242 U/L / GGT: x             Assessment and Plan:    HD for tomorrow, will follow.

## 2017-04-05 NOTE — CHART NOTE - NSCHARTNOTEFT_GEN_A_CORE
86F PMH HTN, hyperlipidemia, CAD s/p stents to LAD and RCA, CHF, DM-2 (on lantus 16u qhs), CVA, Seizure d/o (not taking keppra), dementia, hx of GI bleed, hypothyroidism, ESRD on HD m,w,f presents to hospital via ambulance for change in mental status.  Here in ER found to have leukocytosis WBC 17.8. Rapid response on medical floor for respiratory distress, pulmonary edema. Troponin elevated sec to NSTEMI.  Transferred to ICU, failed BiPAP. Intubated.       Pt extubated yesterday and doing well.  Cardio to follow for NSTEMI--will continue conservative medical management at this time.  ID called for consult (Dr. Duvall).        Pt stable for transfer to telemetry floor. Signed out to Dr. Carver

## 2017-04-05 NOTE — PHYSICAL THERAPY INITIAL EVALUATION ADULT - PLANNED THERAPY INTERVENTIONS, PT EVAL
transfer training/strengthening/bed mobility training/postural re-education/balance training/gait training

## 2017-04-05 NOTE — PHYSICAL THERAPY INITIAL EVALUATION ADULT - IMPAIRMENTS FOUND, PT EVAL
arousal, attention, and cognition/ergonomics and body mechanics/cognitive impairment/gait, locomotion, and balance/muscle strength

## 2017-04-05 NOTE — PROGRESS NOTE ADULT - ASSESSMENT
86F PMH HTN, hyperlipidemia, CAD s/p stents to LAD and RCA, CHF, DM-2 (on lantus 16u qhs), CVA, Seizure d/o (not taking keppra), dementia, hx of GI bleed, hypothyroidism, ESRD on HD m,w,f presents to hospital via ambulance for change in mental status.  Here in ER found to have leukocytosis WBC 17.8. Rapid response on medical floor for respiratory distress, pulmonary edema. Troponin elevated sec to NSTEMI.  Transferred to ICU, failed BiPAP. Intubated.     1. PULM  - acute respiratory failure due to acute pulmonary edema sec to NSTEMI  - s/p extubation yesterday, doing well now  - cont broad spectrum antibiotics, fup panculture    2. CV  - NSTEMI, troponin peaked 5.19, now trending down  - cont asa and plavix  - d/c heparin gtt today  - off pressors now  - 2D echo with EF 45-50%, global CM  - fup cardiology     3. Renal  - ESRD on HD  - HD as per renal, got HD yesterday  -Hb 7.9 today, will repeat, may need transfusion in view of NSTEMI    4. GEN  - condition d/w with son and daughter in detail

## 2017-04-05 NOTE — PHYSICAL THERAPY INITIAL EVALUATION ADULT - GAIT DEVIATIONS NOTED, PT EVAL
decreased jessica/increased time in double stance/decreased step length/decreased hip and knee flexion B

## 2017-04-05 NOTE — PHYSICAL THERAPY INITIAL EVALUATION ADULT - ADDITIONAL COMMENTS
as per daughter Lu: pt has both a rolling walker and a straight cane, ambulates short distances out in the community (i.e. to dialysis). There are 3 steps to enter the home, that pt can negotiate with assistance. There are home health aide services Monday to Friday for 11 hours and Saturday and Sunday for 8 hours.

## 2017-04-05 NOTE — CONSULT NOTE ADULT - ASSESSMENT
resolved leucocytosis   has been on empiric zosyn and vanco ]  ac resp failure from pulm edema from nstemi which can also cause leucocytosis resolved leucocytosis   has been on empiric zosyn and vanco ]  ac resp failure from pulm edema from nstemi which can also cause leucocytosis   contunue current treatment

## 2017-04-05 NOTE — PHYSICAL THERAPY INITIAL EVALUATION ADULT - MODIFIED CLINICAL TEST OF SENSORY INTEGRATION IN BALANCE TEST
Barthel Index: Feeding Score _5__, Bathing Score _0__, Grooming Score _0__, Dressing Score __5_, Bowels Score _0__, Bladder Score _5__, Toilet Score _5__, Transfers Score __10_, Mobility Score _0__, Stairs Score _0__,     Total Score _30__

## 2017-04-06 DIAGNOSIS — I21.4 NON-ST ELEVATION (NSTEMI) MYOCARDIAL INFARCTION: ICD-10-CM

## 2017-04-06 DIAGNOSIS — A09 INFECTIOUS GASTROENTERITIS AND COLITIS, UNSPECIFIED: ICD-10-CM

## 2017-04-06 DIAGNOSIS — I50.23 ACUTE ON CHRONIC SYSTOLIC (CONGESTIVE) HEART FAILURE: ICD-10-CM

## 2017-04-06 DIAGNOSIS — N18.6 END STAGE RENAL DISEASE: ICD-10-CM

## 2017-04-06 DIAGNOSIS — J15.6 PNEUMONIA DUE TO OTHER GRAM-NEGATIVE BACTERIA: ICD-10-CM

## 2017-04-06 DIAGNOSIS — J96.01 ACUTE RESPIRATORY FAILURE WITH HYPOXIA: ICD-10-CM

## 2017-04-06 DIAGNOSIS — G93.41 METABOLIC ENCEPHALOPATHY: ICD-10-CM

## 2017-04-06 LAB
ANION GAP SERPL CALC-SCNC: 12 MMOL/L — SIGNIFICANT CHANGE UP (ref 5–17)
BUN SERPL-MCNC: 26 MG/DL — HIGH (ref 7–23)
CALCIUM SERPL-MCNC: 8.3 MG/DL — LOW (ref 8.5–10.1)
CHLORIDE SERPL-SCNC: 107 MMOL/L — SIGNIFICANT CHANGE UP (ref 96–108)
CO2 SERPL-SCNC: 27 MMOL/L — SIGNIFICANT CHANGE UP (ref 22–31)
CREAT SERPL-MCNC: 5.19 MG/DL — HIGH (ref 0.5–1.3)
GLUCOSE SERPL-MCNC: 92 MG/DL — SIGNIFICANT CHANGE UP (ref 70–99)
HCT VFR BLD CALC: 26.7 % — LOW (ref 34.5–45)
HGB BLD-MCNC: 8.4 G/DL — LOW (ref 11.5–15.5)
MAGNESIUM SERPL-MCNC: 2.5 MG/DL — HIGH (ref 1.8–2.4)
MCHC RBC-ENTMCNC: 28.2 PG — SIGNIFICANT CHANGE UP (ref 27–34)
MCHC RBC-ENTMCNC: 31.6 GM/DL — LOW (ref 32–36)
MCV RBC AUTO: 89.4 FL — SIGNIFICANT CHANGE UP (ref 80–100)
PHOSPHATE SERPL-MCNC: 3.4 MG/DL — SIGNIFICANT CHANGE UP (ref 2.5–4.5)
PLATELET # BLD AUTO: 259 K/UL — SIGNIFICANT CHANGE UP (ref 150–400)
POTASSIUM SERPL-MCNC: 3.5 MMOL/L — SIGNIFICANT CHANGE UP (ref 3.5–5.3)
POTASSIUM SERPL-SCNC: 3.5 MMOL/L — SIGNIFICANT CHANGE UP (ref 3.5–5.3)
PROCALCITONIN SERPL-MCNC: 2.13 NG/ML — HIGH (ref 0–0.05)
RBC # BLD: 2.98 M/UL — LOW (ref 3.8–5.2)
RBC # FLD: 20 % — HIGH (ref 11–15)
SODIUM SERPL-SCNC: 146 MMOL/L — HIGH (ref 135–145)
TROPONIN I SERPL-MCNC: 1.57 NG/ML — HIGH (ref 0.01–0.04)
VANCOMYCIN TROUGH SERPL-MCNC: 16.5 UG/ML — SIGNIFICANT CHANGE UP (ref 10–20)
WBC # BLD: 9.4 K/UL — SIGNIFICANT CHANGE UP (ref 3.8–10.5)
WBC # FLD AUTO: 9.4 K/UL — SIGNIFICANT CHANGE UP (ref 3.8–10.5)

## 2017-04-06 PROCEDURE — 99233 SBSQ HOSP IP/OBS HIGH 50: CPT

## 2017-04-06 RX ORDER — METRONIDAZOLE 500 MG
500 TABLET ORAL ONCE
Qty: 0 | Refills: 0 | Status: COMPLETED | OUTPATIENT
Start: 2017-04-06 | End: 2017-04-06

## 2017-04-06 RX ORDER — METRONIDAZOLE 500 MG
TABLET ORAL
Qty: 0 | Refills: 0 | Status: DISCONTINUED | OUTPATIENT
Start: 2017-04-06 | End: 2017-04-08

## 2017-04-06 RX ORDER — METRONIDAZOLE 500 MG
500 TABLET ORAL EVERY 8 HOURS
Qty: 0 | Refills: 0 | Status: DISCONTINUED | OUTPATIENT
Start: 2017-04-06 | End: 2017-04-08

## 2017-04-06 RX ADMIN — Medication 81 MILLIGRAM(S): at 11:35

## 2017-04-06 RX ADMIN — PIPERACILLIN AND TAZOBACTAM 25 GRAM(S): 4; .5 INJECTION, POWDER, LYOPHILIZED, FOR SOLUTION INTRAVENOUS at 05:59

## 2017-04-06 RX ADMIN — INSULIN GLARGINE 6 UNIT(S): 100 INJECTION, SOLUTION SUBCUTANEOUS at 23:08

## 2017-04-06 RX ADMIN — Medication 25 MILLIGRAM(S): at 22:09

## 2017-04-06 RX ADMIN — Medication 2: at 11:34

## 2017-04-06 RX ADMIN — CLOPIDOGREL BISULFATE 75 MILLIGRAM(S): 75 TABLET, FILM COATED ORAL at 11:35

## 2017-04-06 RX ADMIN — Medication 25 MILLIGRAM(S): at 05:59

## 2017-04-06 RX ADMIN — Medication 100 MILLIGRAM(S): at 22:10

## 2017-04-06 RX ADMIN — ATORVASTATIN CALCIUM 40 MILLIGRAM(S): 80 TABLET, FILM COATED ORAL at 22:09

## 2017-04-06 NOTE — PROGRESS NOTE ADULT - SUBJECTIVE AND OBJECTIVE BOX
Assessment:    NSTEMI in setting of ESRD and sepsis, with a history of CAD and 2 stents in the past, DM  Medical management ongoing, ASA, Plavix,  Statin if not contraindicated  IV ABX  Echocardiogram reviewed  EKG reviewed and compared with previous from last hospital admission here, no acute changes noted.   CE trending down  Stress test Friday or Monday, or before discharge  H/H noted

## 2017-04-06 NOTE — PROGRESS NOTE ADULT - ASSESSMENT
87 yo F with multiple medical problems, ESRD on HD ,  seizure disorder, CAD (s/p stent about 10 years ago), systolic heart failure, DM (type 2 - on lantus), hypothryoid, initially presented with weakness/lethargy/AMS found to had acute hypoxic respiratory failure s/p intubation, acute on chronic systolic heart failure,NSTEMI, hypotension requiring pressors secondary to cardiogenic shock vs septic shock , being treated empirically for presumed gram negative pneumonia. Pt. off pressors, extubated transferred to tele floor, recurrent watery diarrhea  day 5 of broad spectrum antibiotics   will dc and watch 85 yo F with multiple medical problems, ESRD on HD ,  seizure disorder, CAD (s/p stent about 10 years ago), systolic heart failure, DM (type 2 - on lantus), hypothryoid, initially presented with weakness/lethargy/AMS found to had acute hypoxic respiratory failure s/p intubation, acute on chronic systolic heart failure,NSTEMI, hypotension requiring pressors secondary to cardiogenic shock vs septic shock , being treated empirically for presumed gram negative pneumonia. Pt. off pressors, extubated transferred to tele floor,   recurrent watery diarrhea today ; flagyl started by primary care physician noted   day 5 of broad spectrum antibiotics   will dc and watch   follow closely   appears more congestive heart failure VS community acquired pneumonia from all notes; no fevers 87 yo F with multiple medical problems, ESRD on HD ,  seizure disorder, CAD (s/p stent about 10 years ago), systolic heart failure, DM (type 2 - on lantus), hypothryoid, initially presented with weakness/lethargy/AMS found to had acute hypoxic respiratory failure s/p intubation, acute on chronic systolic heart failure,NSTEMI, hypotension requiring pressors secondary to cardiogenic shock vs septic shock , being treated empirically for presumed gram negative pneumonia. Pt. off pressors, extubated transferred to tele floor,   recurrent watery diarrhea today ; flagyl started by primary care physician noted   day 5 of broad spectrum antibiotics   will dc and watch   follow closely   appears more congestive heart failure VS community acquired pneumonia from all notes; no fevers   follow up chest xray am   follow up stool for clostridium difficille infection

## 2017-04-06 NOTE — PROGRESS NOTE ADULT - PROBLEM SELECTOR PLAN 1
s/p extubation, resp status stable on oxygen, NC will check oxygen on RA  c/w dialysis to keep fluid status stable

## 2017-04-06 NOTE — PROGRESS NOTE ADULT - SUBJECTIVE AND OBJECTIVE BOX
Subjective: awake. Assisted by daughter with feeds. Diarrhea persists      MEDICATIONS  (STANDING):  piperacillin/tazobactam IVPB. 3.375Gram(s) IV Intermittent every 12 hours  atorvastatin 40milliGRAM(s) Oral at bedtime  clopidogrel Tablet 75milliGRAM(s) Oral daily  insulin glargine Injectable (LANTUS) 6Unit(s) SubCutaneous at bedtime  dextrose 5%. 1000milliLiter(s) IV Continuous <Continuous>  dextrose 50% Injectable 12.5Gram(s) IV Push once  dextrose 50% Injectable 25Gram(s) IV Push once  dextrose 50% Injectable 25Gram(s) IV Push once  aspirin  chewable 81milliGRAM(s) Oral daily  chlorhexidine 4% Liquid 1Application(s) Topical daily  insulin lispro (HumaLOG) corrective regimen sliding scale  SubCutaneous Before meals and at bedtime  metoprolol 25milliGRAM(s) Oral two times a day    MEDICATIONS  (PRN):  dextrose Gel 1Dose(s) Oral once PRN Blood Glucose LESS THAN 70 milliGRAM(s)/deciliter  acetaminophen   Tablet 650milliGRAM(s) Oral every 6 hours PRN For Temp greater than 38 C (100.4 F)          T(C): 36.6, Max: 36.9 (04-05 @ 15:11)  HR: 81 (59 - 81)  BP: 153/68 (93/60 - 153/68)  RR: 18 (16 - 23)  SpO2: 95% (85% - 100%)  Wt(kg): --    ABG - ( 04 Apr 2017 17:33 )  pH: x     /  pCO2: 38    /  pO2: 120   / HCO3: 30    / Base Excess: 6.8   /  SaO2: 96                  I&O's Detail    I & Os for current day (as of 06 Apr 2017 09:24)  =============================================  IN:    Oral Fluid: 400 ml    IV PiggyBack: 200 ml    heparin  Infusion.: 14 ml    Total IN: 614 ml  ---------------------------------------------  OUT:    Voided: 200 ml    Total OUT: 200 ml  ---------------------------------------------  Total NET: 414 ml           PHYSICAL EXAM:    GENERAL: anxious  EYES: EOMI, PERRLA, conjunctiva and sclera clear  NECK: Supple, no inc in JVP  CHEST/LUNG: Clear  HEART: S1S2  ABDOMEN: Soft, distended, non-tender  EXTREMITIES:  min edema  ACCESS: L AVF pos thrill, bruit      LABS:  CBC Full  -  ( 06 Apr 2017 06:20 )  WBC Count : 9.4 K/uL  Hemoglobin : 8.4 g/dL  Hematocrit : 26.7 %  Platelet Count - Automated : 259 K/uL  Mean Cell Volume : 89.4 fl  Mean Cell Hemoglobin : 28.2 pg  Mean Cell Hemoglobin Concentration : 31.6 gm/dL  Auto Neutrophil # : x  Auto Lymphocyte # : x  Auto Monocyte # : x  Auto Eosinophil # : x  Auto Basophil # : x  Auto Neutrophil % : x  Auto Lymphocyte % : x  Auto Monocyte % : x  Auto Eosinophil % : x  Auto Basophil % : x    04-06    146<H>  |  107  |  26<H>  ----------------------------<  92  3.5   |  27  |  5.19<H>    Ca    8.3<L>      06 Apr 2017 06:20  Phos  3.4     04-06  Mg     2.5     04-06      PTT - ( 05 Apr 2017 08:29 )  PTT:72.8 sec        ASSESSMENT and PLAN:    NSTEMI, s/p pulm edema, intubation/extubation, sepsis, diarrhea, HD dependent ESRD    * Maint HD today as Rxed. Goal UF 2kg. 3K bath

## 2017-04-06 NOTE — PROGRESS NOTE ADULT - ASSESSMENT
87 yo F with multiple medical problems, ESRD on HD ,  seizure disorder, CAD (s/p stent about 10 years ago), systolic heart failure, DM (type 2 - on lantus), hypothryoid, initially presented with weakness/lethargy/AMS found to had acute hypoxic respiratory failure s/p intubation, acute on chronic systolic heart failure,NSTEMI, hypotension requiring pressors secondary to cardiogenic shock vs septic shock , being treated empirically for presumed gram negative pneumonia. Pt. off pressors, extubated transferred to tele floor, recurrent watery diarrhea

## 2017-04-06 NOTE — PROGRESS NOTE ADULT - SUBJECTIVE AND OBJECTIVE BOX
Pt. is a 85 y/o female w/pmhx of HTN, DM-2 (on lantus 16u qhs), Seizure d/o (not taking keppra), ESRD on HD m,w,f was in [facility] till friday while in HD pt pulled catheter and later was told there was fistula clot, went to vascular yesterday andafte had full HD, son states after HD pt felt weak. states she was just not herself and brings her in, no seizure like activity noted, no cp, palitations n/v/d/c no fever or chills. (02 Apr 2017 05:01)  here ac resp failure intubated extubated   very poor historian  out of icu     Allergies    No Known Allergies    Intolerances        MEDICATIONS  (STANDING):  piperacillin/tazobactam IVPB. 3.375Gram(s) IV Intermittent every 12 hours  atorvastatin 40milliGRAM(s) Oral at bedtime  clopidogrel Tablet 75milliGRAM(s) Oral daily  insulin glargine Injectable (LANTUS) 6Unit(s) SubCutaneous at bedtime  dextrose 5%. 1000milliLiter(s) IV Continuous <Continuous>  dextrose 50% Injectable 12.5Gram(s) IV Push once  dextrose 50% Injectable 25Gram(s) IV Push once  dextrose 50% Injectable 25Gram(s) IV Push once  aspirin  chewable 81milliGRAM(s) Oral daily  insulin lispro (HumaLOG) corrective regimen sliding scale  SubCutaneous Before meals and at bedtime  metoprolol 25milliGRAM(s) Oral two times a day  metroNIDAZOLE  IVPB  IV Intermittent   metroNIDAZOLE  IVPB 500milliGRAM(s) IV Intermittent once  metroNIDAZOLE  IVPB 500milliGRAM(s) IV Intermittent every 8 hours    MEDICATIONS  (PRN):  dextrose Gel 1Dose(s) Oral once PRN Blood Glucose LESS THAN 70 milliGRAM(s)/deciliter  acetaminophen   Tablet 650milliGRAM(s) Oral every 6 hours PRN For Temp greater than 38 C (100.4 F)      REVIEW OF SYSTEMS:    unable to obtain   VITAL SIGNS:  T(C): 36.9, Max: 37.1 (04-06 @ 17:00)  T(F): 98.4, Max: 98.8 (04-06 @ 17:00)  HR: 69 (68 - 81)  BP: 152/57 (142/42 - 175/66)  RR: 16 (16 - 20)  SpO2: 98% (94% - 100%)  Wt(kg): --    PHYSICAL EXAM:    GENERAL: not in any distress  HEENT: Neck is supple, normocephalic, atraumatic   CHEST/LUNG: Clear to percussion bilaterally; No rales, rhonchi, wheezing  HEART: Regular rate and rhythm; No murmurs, rubs, or gallops  ABDOMEN: Soft, Nontender, Nondistended; Bowel sounds present, no rebound   EXTREMITIES:  2+ Peripheral Pulses, No clubbing, cyanosis, or edema  left arm AVF   GENITOURINARY: NEGATIVE   SKIN: No rashes or lesions  BACK: no pressor sore   NERVOUS SYSTEM:  Alert   PSYCH: confused    LABS:                         8.4    9.4   )-----------( 259      ( 06 Apr 2017 06:20 )             26.7     04-06    146<H>  |  107  |  26<H>  ----------------------------<  92  3.5   |  27  |  5.19<H>    Ca    8.3<L>      06 Apr 2017 06:20  Phos  3.4     04-06  Mg     2.5     04-06        PTT - ( 05 Apr 2017 08:29 )  PTT:72.8 sec          Thyroid Stimulating Hormone, Serum: <0.005 uU/mL (04-02 @ 08:18)          Vancomycin Level, Trough: 16.5 ug/mL (04-06 @ 06:20)        Culture Results:   No growth (04-03 @ 08:24)  Culture Results:   No growth to date. (04-02 @ 10:21)  Culture Results:   No growth to date. (04-02 @ 10:21)  Culture Results:   No growth (04-02 @ 10:15)                Radiology:  IMPRESSION:   Improvement in the bilateral lung patchy opacities c/w 4/4/17 exam.  Prior ET and NG tubes have been withdrawn. Pt. is a 87 y/o female w/pmhx of HTN, DM-2 (on lantus 16u qhs), Seizure d/o (not taking keppra), ESRD on HD m,w,f was in [facility] till friday while in HD pt pulled catheter and later was told there was fistula clot, went to vascular yesterday andafte had full HD, son states after HD pt felt weak. states she was just not herself and brings her in, no seizure like activity noted, no cp, palitations n/v/d/c no fever or chills. (02 Apr 2017 05:01)  here ac resp failure intubated extubated   very poor historian  out of icu   today seen and discussed with several family members by bedside Allergies    No Known Allergies    Intolerances        MEDICATIONS  (STANDING):  piperacillin/tazobactam IVPB. 3.375Gram(s) IV Intermittent every 12 hours  atorvastatin 40milliGRAM(s) Oral at bedtime  clopidogrel Tablet 75milliGRAM(s) Oral daily  insulin glargine Injectable (LANTUS) 6Unit(s) SubCutaneous at bedtime  dextrose 5%. 1000milliLiter(s) IV Continuous <Continuous>  dextrose 50% Injectable 12.5Gram(s) IV Push once  dextrose 50% Injectable 25Gram(s) IV Push once  dextrose 50% Injectable 25Gram(s) IV Push once  aspirin  chewable 81milliGRAM(s) Oral daily  insulin lispro (HumaLOG) corrective regimen sliding scale  SubCutaneous Before meals and at bedtime  metoprolol 25milliGRAM(s) Oral two times a day  metroNIDAZOLE  IVPB  IV Intermittent   metroNIDAZOLE  IVPB 500milliGRAM(s) IV Intermittent once  metroNIDAZOLE  IVPB 500milliGRAM(s) IV Intermittent every 8 hours    MEDICATIONS  (PRN):  dextrose Gel 1Dose(s) Oral once PRN Blood Glucose LESS THAN 70 milliGRAM(s)/deciliter  acetaminophen   Tablet 650milliGRAM(s) Oral every 6 hours PRN For Temp greater than 38 C (100.4 F)      REVIEW OF SYSTEMS:    unable to obtain   VITAL SIGNS:  T(C): 36.9, Max: 37.1 (04-06 @ 17:00)  T(F): 98.4, Max: 98.8 (04-06 @ 17:00)  HR: 69 (68 - 81)  BP: 152/57 (142/42 - 175/66)  RR: 16 (16 - 20)  SpO2: 98% (94% - 100%)  Wt(kg): --    PHYSICAL EXAM:    GENERAL: not in any distress  HEENT: Neck is supple, normocephalic, atraumatic   CHEST/LUNG: Clear to percussion bilaterally; No rales, rhonchi, wheezing  HEART: Regular rate and rhythm; No murmurs, rubs, or gallops  ABDOMEN: Soft, Nontender, Nondistended; Bowel sounds present, no rebound   EXTREMITIES:  2+ Peripheral Pulses, No clubbing, cyanosis, or edema  left arm AVF   GENITOURINARY: NEGATIVE   SKIN: No rashes or lesions  BACK: no pressor sore   NERVOUS SYSTEM:  Alert       LABS:                         8.4    9.4   )-----------( 259      ( 06 Apr 2017 06:20 )             26.7     04-06    146<H>  |  107  |  26<H>  ----------------------------<  92  3.5   |  27  |  5.19<H>    Ca    8.3<L>      06 Apr 2017 06:20  Phos  3.4     04-06  Mg     2.5     04-06        PTT - ( 05 Apr 2017 08:29 )  PTT:72.8 sec          Thyroid Stimulating Hormone, Serum: <0.005 uU/mL (04-02 @ 08:18)          Vancomycin Level, Trough: 16.5 ug/mL (04-06 @ 06:20)        Culture Results:   No growth (04-03 @ 08:24)  Culture Results:   No growth to date. (04-02 @ 10:21)  Culture Results:   No growth to date. (04-02 @ 10:21)  Culture Results:   No growth (04-02 @ 10:15)                Radiology:  IMPRESSION:   Improvement in the bilateral lung patchy opacities c/w 4/4/17 exam.  Prior ET and NG tubes have been withdrawn.

## 2017-04-06 NOTE — PROGRESS NOTE ADULT - SUBJECTIVE AND OBJECTIVE BOX
Patient is a 86y old  Female who presents with a chief complaint of Daughter stated that patient was unresponsive at home. (02 Apr 2017 15:53)      INTERVAL HPI/OVERNIGHT EVENTS:  transferred out of icu  profuse watery diarrhea in flexiseal, had reported resolved in icu previously after off tube feeds  MEDICATIONS  (STANDING):  piperacillin/tazobactam IVPB. 3.375Gram(s) IV Intermittent every 12 hours  atorvastatin 40milliGRAM(s) Oral at bedtime  clopidogrel Tablet 75milliGRAM(s) Oral daily  insulin glargine Injectable (LANTUS) 6Unit(s) SubCutaneous at bedtime  dextrose 5%. 1000milliLiter(s) IV Continuous <Continuous>  dextrose 50% Injectable 12.5Gram(s) IV Push once  dextrose 50% Injectable 25Gram(s) IV Push once  dextrose 50% Injectable 25Gram(s) IV Push once  aspirin  chewable 81milliGRAM(s) Oral daily  insulin lispro (HumaLOG) corrective regimen sliding scale  SubCutaneous Before meals and at bedtime  metoprolol 25milliGRAM(s) Oral two times a day    MEDICATIONS  (PRN):  dextrose Gel 1Dose(s) Oral once PRN Blood Glucose LESS THAN 70 milliGRAM(s)/deciliter  acetaminophen   Tablet 650milliGRAM(s) Oral every 6 hours PRN For Temp greater than 38 C (100.4 F)      Allergies    No Known Allergies    Intolerances        REVIEW OF SYSTEMS:  unable to assess, unreliable historian    Vital Signs Last 24 Hrs  T(C): 36.9, Max: 36.9 (04-06 @ 00:06)  T(F): 98.4, Max: 98.4 (04-06 @ 00:06)  HR: 69 (63 - 81)  BP: 152/46 (93/60 - 167/55)  BP(mean): 70 (64 - 70)  RR: 18 (17 - 20)  SpO2: 100% (85% - 100%)    PHYSICAL EXAM:  GENERAL: NAD, responsive, alert, confused, sitting in chair  HEAD:  Atraumatic, Normocephalic  EYES: EOMI, PERRLA, conjunctiva and sclera clear  ENMT: No tonsillar erythema, exudates, or enlargement; Moist mucous membranes, Good dentition, No lesions  NECK: Supple, No JVD, Normal thyroid  NERVOUS SYSTEM:  Alert, confused, Good concentration; Motor Strength 4/5 B/L upper and lower extremities; DTRs 2+ intact and symmetric  CHEST/LUNG: decreased BS b/l  HEART: Regular rate and rhythm; No murmurs, rubs, or gallops  ABDOMEN: Soft, Nontender, Nondistended; Bowel sounds present, flexiseal with watery brownish/greenish stool  EXTREMITIES:  2+ Peripheral Pulses, No clubbing, cyanosis, or edema      LABS:                        8.4    9.4   )-----------( 259      ( 06 Apr 2017 06:20 )             26.7     04-06    146<H>  |  107  |  26<H>  ----------------------------<  92  3.5   |  27  |  5.19<H>    Ca    8.3<L>      06 Apr 2017 06:20  Phos  3.4     04-06  Mg     2.5     04-06      PTT - ( 05 Apr 2017 08:29 )  PTT:72.8 sec    CAPILLARY BLOOD GLUCOSE  192 (06 Apr 2017 11:10)  108 (06 Apr 2017 07:42)  163 (05 Apr 2017 21:04)  189 (05 Apr 2017 17:23)      RADIOLOGY & ADDITIONAL TESTS:    Imaging Personally Reviewed:  [ x] YES  [ ] NO    Consultant(s) Notes Reviewed:  [x ] YES  [ ] NO    Care Discussed with Consultants/Other Providers [ x] YES  [ ] NO

## 2017-04-07 LAB
ANION GAP SERPL CALC-SCNC: 10 MMOL/L — SIGNIFICANT CHANGE UP (ref 5–17)
BASOPHILS # BLD AUTO: 0.1 K/UL — SIGNIFICANT CHANGE UP (ref 0–0.2)
BASOPHILS NFR BLD AUTO: 1.2 % — SIGNIFICANT CHANGE UP (ref 0–2)
BUN SERPL-MCNC: 18 MG/DL — SIGNIFICANT CHANGE UP (ref 7–23)
C DIFF BY PCR RESULT: SIGNIFICANT CHANGE UP
C DIFF TOX GENS STL QL NAA+PROBE: SIGNIFICANT CHANGE UP
CALCIUM SERPL-MCNC: 8.3 MG/DL — LOW (ref 8.5–10.1)
CHLORIDE SERPL-SCNC: 104 MMOL/L — SIGNIFICANT CHANGE UP (ref 96–108)
CO2 SERPL-SCNC: 29 MMOL/L — SIGNIFICANT CHANGE UP (ref 22–31)
CREAT SERPL-MCNC: 4.22 MG/DL — HIGH (ref 0.5–1.3)
CULTURE RESULTS: SIGNIFICANT CHANGE UP
CULTURE RESULTS: SIGNIFICANT CHANGE UP
EOSINOPHIL # BLD AUTO: 1 K/UL — HIGH (ref 0–0.5)
EOSINOPHIL NFR BLD AUTO: 13.3 % — HIGH (ref 0–6)
GLUCOSE SERPL-MCNC: 106 MG/DL — HIGH (ref 70–99)
HCT VFR BLD CALC: 30 % — LOW (ref 34.5–45)
HGB BLD-MCNC: 9.1 G/DL — LOW (ref 11.5–15.5)
LYMPHOCYTES # BLD AUTO: 1.7 K/UL — SIGNIFICANT CHANGE UP (ref 1–3.3)
LYMPHOCYTES # BLD AUTO: 22 % — SIGNIFICANT CHANGE UP (ref 13–44)
MCHC RBC-ENTMCNC: 27.4 PG — SIGNIFICANT CHANGE UP (ref 27–34)
MCHC RBC-ENTMCNC: 30.5 GM/DL — LOW (ref 32–36)
MCV RBC AUTO: 89.8 FL — SIGNIFICANT CHANGE UP (ref 80–100)
MONOCYTES # BLD AUTO: 1 K/UL — HIGH (ref 0–0.9)
MONOCYTES NFR BLD AUTO: 13.3 % — SIGNIFICANT CHANGE UP (ref 2–14)
NEUTROPHILS # BLD AUTO: 4 K/UL — SIGNIFICANT CHANGE UP (ref 1.8–7.4)
NEUTROPHILS NFR BLD AUTO: 50.2 % — SIGNIFICANT CHANGE UP (ref 43–77)
PLATELET # BLD AUTO: 302 K/UL — SIGNIFICANT CHANGE UP (ref 150–400)
POTASSIUM SERPL-MCNC: 3.8 MMOL/L — SIGNIFICANT CHANGE UP (ref 3.5–5.3)
POTASSIUM SERPL-SCNC: 3.8 MMOL/L — SIGNIFICANT CHANGE UP (ref 3.5–5.3)
RBC # BLD: 3.34 M/UL — LOW (ref 3.8–5.2)
RBC # FLD: 19.8 % — HIGH (ref 11–15)
SODIUM SERPL-SCNC: 143 MMOL/L — SIGNIFICANT CHANGE UP (ref 135–145)
SPECIMEN SOURCE: SIGNIFICANT CHANGE UP
SPECIMEN SOURCE: SIGNIFICANT CHANGE UP
WBC # BLD: 7.9 K/UL — SIGNIFICANT CHANGE UP (ref 3.8–10.5)
WBC # FLD AUTO: 7.9 K/UL — SIGNIFICANT CHANGE UP (ref 3.8–10.5)

## 2017-04-07 PROCEDURE — 99233 SBSQ HOSP IP/OBS HIGH 50: CPT

## 2017-04-07 PROCEDURE — 71010: CPT | Mod: 26

## 2017-04-07 RX ORDER — HEPARIN SODIUM 5000 [USP'U]/ML
5000 INJECTION INTRAVENOUS; SUBCUTANEOUS EVERY 12 HOURS
Qty: 0 | Refills: 0 | Status: DISCONTINUED | OUTPATIENT
Start: 2017-04-07 | End: 2017-04-11

## 2017-04-07 RX ORDER — AMLODIPINE BESYLATE 2.5 MG/1
10 TABLET ORAL DAILY
Qty: 0 | Refills: 0 | Status: DISCONTINUED | OUTPATIENT
Start: 2017-04-07 | End: 2017-04-11

## 2017-04-07 RX ADMIN — Medication 81 MILLIGRAM(S): at 11:46

## 2017-04-07 RX ADMIN — Medication 25 MILLIGRAM(S): at 05:35

## 2017-04-07 RX ADMIN — Medication 25 MILLIGRAM(S): at 17:15

## 2017-04-07 RX ADMIN — INSULIN GLARGINE 6 UNIT(S): 100 INJECTION, SOLUTION SUBCUTANEOUS at 21:51

## 2017-04-07 RX ADMIN — ATORVASTATIN CALCIUM 40 MILLIGRAM(S): 80 TABLET, FILM COATED ORAL at 21:50

## 2017-04-07 RX ADMIN — CLOPIDOGREL BISULFATE 75 MILLIGRAM(S): 75 TABLET, FILM COATED ORAL at 11:46

## 2017-04-07 RX ADMIN — Medication 100 MILLIGRAM(S): at 21:58

## 2017-04-07 RX ADMIN — AMLODIPINE BESYLATE 10 MILLIGRAM(S): 2.5 TABLET ORAL at 21:50

## 2017-04-07 RX ADMIN — Medication 100 MILLIGRAM(S): at 11:46

## 2017-04-07 RX ADMIN — Medication 100 MILLIGRAM(S): at 05:34

## 2017-04-07 NOTE — CONSULT NOTE ADULT - ASSESSMENT
Pt. is a 85 y/o female w/pmhx of HTN, DM-2 (on lantus 16u qhs), Seizure d/o (not taking keppra), ESRD on HD m,w,f was in [facility] till friday while in HD pt pulled catheter and later was told there was fistula clot, went to vascular yesterday andafte had full HD, son states after HD pt felt weak. states she was just not herself and brings her in, no seizure like activity noted, no cp, palitations n/v/d/c no fever or chills. (02 Apr 2017 05:01)  Chart reviewed. Patient admitted with septic shock and NSTEMI  Consult callled for diarrhea. Patient was in ICU with flexiceal.  The average out put was 300 ml. Patient was on antibiotics.  Today, flexiceal was removed  @ 11:30 AND HAS NOT HAD A BOWEL MOVEMENT SINCE. ( 3 PM ). Patient now only on IV Flagyl ) Stool studies pending. Patient is eating solid foods. The patient denies any history of melena, hematochezia, hematemesis, nausea, vomiting, abdominal pain, constipation, diarrhea, or change in bowel movements.  She has never had a colonoscopy.  Diarrhea - 1) check stool studies 2) stool count 3) will hold off on any procedures at present time 4) maintain same diet.  Will follow.

## 2017-04-07 NOTE — CONSULT NOTE ADULT - SUBJECTIVE AND OBJECTIVE BOX
HPI:  Pt. is a 85 y/o female w/pmhx of HTN, DM-2 (on lantus 16u qhs), Seizure d/o (not taking keppra), ESRD on HD m,w,f was in [facility] till friday while in HD pt pulled catheter and later was told there was fistula clot, went to vascular yesterday andafte had full HD, son states after HD pt felt weak. states she was just not herself and brings her in, no seizure like activity noted, no cp, palitations n/v/d/c no fever or chills. (02 Apr 2017 05:01)  Chart reviewed. Patient admitted with septic shock and NSTEMI  Consult callled for diarrhea. Patient was in ICU with flexiceal.  The average out put was 300 ml. Patient was on antibiotics.  Today, flexiceal was removed  @ 11:30 AND HAS NOT HAD A BOWEL MOVEMENT SINCE. ( 3 PM ). Patient now only on IV Flagyl ) Stool studies pending.     PAST MEDICAL & SURGICAL HISTORY:  LBBB (left bundle branch block)  Seizure  H/O: Hypothyroidism  Hypertension  Acute Renal Insufficiency  CHF (Congestive Heart Failure)  Diabetes Mellitus  Dyslipidemia  S/P cardiac cath: 1 stent 10 years ago  Diabetes  FH: HTN (hypertension)  Acute CHF  No Past Surgical History      MEDICATIONS  (STANDING):  atorvastatin 40milliGRAM(s) Oral at bedtime  clopidogrel Tablet 75milliGRAM(s) Oral daily  insulin glargine Injectable (LANTUS) 6Unit(s) SubCutaneous at bedtime  dextrose 5%. 1000milliLiter(s) IV Continuous <Continuous>  dextrose 50% Injectable 12.5Gram(s) IV Push once  dextrose 50% Injectable 25Gram(s) IV Push once  dextrose 50% Injectable 25Gram(s) IV Push once  aspirin  chewable 81milliGRAM(s) Oral daily  insulin lispro (HumaLOG) corrective regimen sliding scale  SubCutaneous Before meals and at bedtime  metoprolol 25milliGRAM(s) Oral two times a day  metroNIDAZOLE  IVPB  IV Intermittent   metroNIDAZOLE  IVPB 500milliGRAM(s) IV Intermittent every 8 hours    MEDICATIONS  (PRN):  dextrose Gel 1Dose(s) Oral once PRN Blood Glucose LESS THAN 70 milliGRAM(s)/deciliter  acetaminophen   Tablet 650milliGRAM(s) Oral every 6 hours PRN For Temp greater than 38 C (100.4 F)      Allergies    No Known Allergies    Intolerances        FAMILY HISTORY:  No pertinent family history in first degree relatives      REVIEW OF SYSTEMS:    CONSTITUTIONAL: No fever, weight loss, or fatigue  EYES: No eye pain, visual disturbances, or discharge  ENMT:  No difficulty hearing, tinnitus, vertigo; No sinus or throat pain  NECK: No pain or stiffness  BREASTS: No pain, masses, or nipple discharge  RESPIRATORY: No cough, wheezing, chills or hemoptysis; No shortness of breath  CARDIOVASCULAR: No chest pain, palpitations, dizziness, or leg swelling  GASTROINTESTINAL: No abdominal or epigastric pain. No nausea, vomiting, or hematemesis; No diarrhea or constipation. No melena or hematochezia.  GENITOURINARY: No dysuria, frequency, hematuria, or incontinence  NEUROLOGICAL: No headaches, memory loss, loss of strength, numbness, or tremors  SKIN: No itching, burning, rashes, or lesions   LYMPH NODES: No enlarged glands  ENDOCRINE: No heat or cold intolerance; No hair loss  MUSCULOSKELETAL: No joint pain or swelling; No muscle, back, or extremity pain  PSYCHIATRIC: No depression, anxiety, mood swings, or difficulty sleeping  HEME/LYMPH: No easy bruising, or bleeding gums  ALLERGY AND IMMUNOLOGIC: No hives or eczema          SOCIAL HISTORY:    FAMILY HISTORY:  No pertinent family history in first degree relatives      Vital Signs Last 24 Hrs  T(C): 36.5, Max: 37.1 (04-06 @ 17:00)  T(F): 97.7, Max: 98.8 (04-06 @ 17:00)  HR: 58 (58 - 83)  BP: 181/58 (152/57 - 181/58)  BP(mean): --  RR: 17 (16 - 17)  SpO2: 100% (95% - 100%)    PHYSICAL EXAM:    GENERAL: NAD, well-groomed, well-developed  HEAD:  Atraumatic, Normocephalic  EYES: EOMI, PERRLA, conjunctiva and sclera clear  ENMT: No tonsillar erythema, exudates, or enlargement; Moist mucous membranes, Good dentition, No lesions  NECK: Supple, No JVD, Normal thyroid  NERVOUS SYSTEM:  Alert & Oriented X3, Good concentration; Motor Strength 5/5 B/L upper and lower extremities; DTRs 2+ intact and symmetric  CHEST/LUNG: Clear to percussion bilaterally; No rales, rhonchi, wheezing, or rubs  HEART: Regular rate and rhythm; No murmurs, rubs, or gallops  ABDOMEN: Soft, Nontender, Nondistended; Bowel sounds present  EXTREMITIES:  2+ Peripheral Pulses, No clubbing, cyanosis, or edema  LYMPH: No lymphadenopathy noted   RECTAL: No masses, prostate normal size and smooth, Guaiaci negative   BREAST: No palpable masses, skin no lesions no retractions, no discharges. adnexal no palpable masses noted   GYN: uterus normal size, adnexal, no palpable masses, no CMT, no uterine discharge   SKIN: No rashes or lesions    LABS:    07 Apr 2017 08:06    143    |  104    |  18     ----------------------------<  106    3.8     |  29     |  4.22   06 Apr 2017 06:20    146    |  107    |  26     ----------------------------<  92     3.5     |  27     |  5.19   05 Apr 2017 03:44    145    |  105    |  15     ----------------------------<  122    3.3     |  29     |  3.29   04 Apr 2017 03:30    142    |  102    |  45     ----------------------------<  99     4.4     |  23     |  6.23   03 Apr 2017 08:24    139    |  102    |  38     ----------------------------<  134    4.9     |  26     |  5.22   02 Apr 2017 23:05    138    |  102    |  33     ----------------------------<  161    3.8     |  19     |  4.82   02 Apr 2017 00:29    139    |  99     |  27     ----------------------------<  411    4.3     |  32     |  3.88     Ca    8.3        07 Apr 2017 08:06  Ca    8.3        06 Apr 2017 06:20  Ca    8.0        05 Apr 2017 03:44  Ca    7.9        04 Apr 2017 03:30  Ca    8.9        03 Apr 2017 08:24  Ca    8.9        02 Apr 2017 23:05  Ca    8.5        02 Apr 2017 00:29  Phos  3.4       06 Apr 2017 06:20  Phos  2.6       05 Apr 2017 03:44  Phos  4.9       04 Apr 2017 03:30  Phos  4.4       03 Apr 2017 03:54  Mg     2.5       06 Apr 2017 06:20  Mg     2.4       05 Apr 2017 03:44  Mg     2.7       04 Apr 2017 03:30  Mg     2.7       02 Apr 2017 23:05    TPro  5.9    /  Alb  2.1    /  TBili  0.3    /  DBili  x      /  AST  242    /  ALT  117    /  AlkPhos  62     04 Apr 2017 03:30  TPro  7.1    /  Alb  2.6    /  TBili  0.5    /  DBili  x      /  AST  30     /  ALT  11     /  AlkPhos  70     02 Apr 2017 23:05  TPro  6.5    /  Alb  2.6    /  TBili  0.4    /  DBili  x      /  AST  15     /  ALT  19     /  AlkPhos  86     02 Apr 2017 00:29            RADIOLOGY & ADDITIONAL STUDIES: HPI:  Pt. is a 85 y/o female w/pmhx of HTN, DM-2 (on lantus 16u qhs), Seizure d/o (not taking keppra), ESRD on HD m,w,f was in [facility] till friday while in HD pt pulled catheter and later was told there was fistula clot, went to vascular yesterday andafte had full HD, son states after HD pt felt weak. states she was just not herself and brings her in, no seizure like activity noted, no cp, palitations n/v/d/c no fever or chills. (02 Apr 2017 05:01)  Chart reviewed. Patient admitted with septic shock and NSTEMI  Consult callled for diarrhea. Patient was in ICU with flexiceal.  The average out put was 300 ml. Patient was on antibiotics.  Today, flexiceal was removed  @ 11:30 AND HAS NOT HAD A BOWEL MOVEMENT SINCE. ( 3 PM ). Patient now only on IV Flagyl ) Stool studies pending. Patient is eating solid foods. The patient denies any history of melena, hematochezia, hematemesis, nausea, vomiting, abdominal pain, constipation, diarrhea, or change in bowel movements.  She has never had a colonoscopy.    Today, the patient denies melena, hematochezia, hematemesis, nausea, vomiting, abdominal pain, constipation, or change in bowel movements   PAST MEDICAL & SURGICAL HISTORY:  LBBB (left bundle branch block)  Seizure  H/O: Hypothyroidism  Hypertension  Acute Renal Insufficiency  CHF (Congestive Heart Failure)  Diabetes Mellitus  Dyslipidemia  S/P cardiac cath: 1 stent 10 years ago  Diabetes  FH: HTN (hypertension)  Acute CHF  No Past Surgical History      MEDICATIONS  (STANDING):  atorvastatin 40milliGRAM(s) Oral at bedtime  clopidogrel Tablet 75milliGRAM(s) Oral daily  insulin glargine Injectable (LANTUS) 6Unit(s) SubCutaneous at bedtime  dextrose 5%. 1000milliLiter(s) IV Continuous <Continuous>  dextrose 50% Injectable 12.5Gram(s) IV Push once  dextrose 50% Injectable 25Gram(s) IV Push once  dextrose 50% Injectable 25Gram(s) IV Push once  aspirin  chewable 81milliGRAM(s) Oral daily  insulin lispro (HumaLOG) corrective regimen sliding scale  SubCutaneous Before meals and at bedtime  metoprolol 25milliGRAM(s) Oral two times a day  metroNIDAZOLE  IVPB  IV Intermittent   metroNIDAZOLE  IVPB 500milliGRAM(s) IV Intermittent every 8 hours    MEDICATIONS  (PRN):  dextrose Gel 1Dose(s) Oral once PRN Blood Glucose LESS THAN 70 milliGRAM(s)/deciliter  acetaminophen   Tablet 650milliGRAM(s) Oral every 6 hours PRN For Temp greater than 38 C (100.4 F)      Allergies    No Known Allergies    Intolerances        FAMILY HISTORY:  No pertinent family history in first degree relatives      REVIEW OF SYSTEMS:    CONSTITUTIONAL: No fever, weight loss, or fatigue  EYES: No eye pain, visual disturbances, or discharge  ENMT:  No difficulty hearing, tinnitus, vertigo; No sinus or throat pain  NECK: No pain or stiffness  BREASTS: No pain, masses, or nipple discharge  RESPIRATORY: No cough, wheezing, chills or hemoptysis; No shortness of breath  CARDIOVASCULAR: No chest pain, palpitations, dizziness, or leg swelling  GASTROINTESTINAL: No abdominal or epigastric pain. No nausea, vomiting, or hematemesis; No constipation. No melena or hematochezia.  GENITOURINARY: No dysuria, frequency, hematuria, or incontinence  NEUROLOGICAL: No headaches, memory loss, loss of strength, numbness, or tremors  SKIN: No itching, burning, rashes, or lesions   LYMPH NODES: No enlarged glands  ENDOCRINE: No heat or cold intolerance; No hair loss  MUSCULOSKELETAL: No joint pain or swelling; No muscle, back, or extremity pain  PSYCHIATRIC: No depression, anxiety, mood swings, or difficulty sleeping  HEME/LYMPH: No easy bruising, or bleeding gums  ALLERGY AND IMMUNOLOGIC: No hives or eczema          SOCIAL HISTORY:    FAMILY HISTORY:  No pertinent family history in first degree relatives      Vital Signs Last 24 Hrs  T(C): 36.5, Max: 37.1 (04-06 @ 17:00)  T(F): 97.7, Max: 98.8 (04-06 @ 17:00)  HR: 58 (58 - 83)  BP: 181/58 (152/57 - 181/58)  BP(mean): --  RR: 17 (16 - 17)  SpO2: 100% (95% - 100%)    PHYSICAL EXAM:    GENERAL: NAD, well-groomed, well-developed  HEAD:  Atraumatic, Normocephalic  EYES: EOMI, PERRLA, conjunctiva and sclera clear  ENMT: No tonsillar erythema, exudates, or enlargement; Moist mucous membranes, Good dentition, No lesions  NECK: Supple, No JVD, Normal thyroid  NERVOUS SYSTEM:  Alert & Oriented X3, Good concentration; Motor Strength 5/5 B/L upper and lower extremities; DTRs 2+ intact and symmetric  CHEST/LUNG: Clear to percussion bilaterally; No rales, rhonchi, wheezing, or rubs  HEART: Regular rate and rhythm; No murmurs, rubs, or gallops  ABDOMEN: Soft, Nontender, Nondistended; Bowel sounds present  EXTREMITIES:  2+ Peripheral Pulses, No clubbing, cyanosis, or edema  LYMPH: No lymphadenopathy noted   SKIN: No rashes or lesions    LABS:    07 Apr 2017 08:06    143    |  104    |  18     ----------------------------<  106    3.8     |  29     |  4.22   06 Apr 2017 06:20    146    |  107    |  26     ----------------------------<  92     3.5     |  27     |  5.19   05 Apr 2017 03:44    145    |  105    |  15     ----------------------------<  122    3.3     |  29     |  3.29   04 Apr 2017 03:30    142    |  102    |  45     ----------------------------<  99     4.4     |  23     |  6.23   03 Apr 2017 08:24    139    |  102    |  38     ----------------------------<  134    4.9     |  26     |  5.22   02 Apr 2017 23:05    138    |  102    |  33     ----------------------------<  161    3.8     |  19     |  4.82   02 Apr 2017 00:29    139    |  99     |  27     ----------------------------<  411    4.3     |  32     |  3.88     Ca    8.3        07 Apr 2017 08:06  Ca    8.3        06 Apr 2017 06:20  Ca    8.0        05 Apr 2017 03:44  Ca    7.9        04 Apr 2017 03:30  Ca    8.9        03 Apr 2017 08:24  Ca    8.9        02 Apr 2017 23:05  Ca    8.5        02 Apr 2017 00:29  Phos  3.4       06 Apr 2017 06:20  Phos  2.6       05 Apr 2017 03:44  Phos  4.9       04 Apr 2017 03:30  Phos  4.4       03 Apr 2017 03:54  Mg     2.5       06 Apr 2017 06:20  Mg     2.4       05 Apr 2017 03:44  Mg     2.7       04 Apr 2017 03:30  Mg     2.7       02 Apr 2017 23:05    TPro  5.9    /  Alb  2.1    /  TBili  0.3    /  DBili  x      /  AST  242    /  ALT  117    /  AlkPhos  62     04 Apr 2017 03:30  TPro  7.1    /  Alb  2.6    /  TBili  0.5    /  DBili  x      /  AST  30     /  ALT  11     /  AlkPhos  70     02 Apr 2017 23:05  TPro  6.5    /  Alb  2.6    /  TBili  0.4    /  DBili  x      /  AST  15     /  ALT  19     /  AlkPhos  86     02 Apr 2017 00:29            RADIOLOGY & ADDITIONAL STUDIES:

## 2017-04-07 NOTE — PROGRESS NOTE ADULT - SUBJECTIVE AND OBJECTIVE BOX
Patient seen in follow up for ESRD; appears comfortable.  No distress.    MEDICATIONS  (STANDING):  atorvastatin 40milliGRAM(s) Oral at bedtime  clopidogrel Tablet 75milliGRAM(s) Oral daily  insulin glargine Injectable (LANTUS) 6Unit(s) SubCutaneous at bedtime  dextrose 5%. 1000milliLiter(s) IV Continuous <Continuous>  dextrose 50% Injectable 12.5Gram(s) IV Push once  dextrose 50% Injectable 25Gram(s) IV Push once  dextrose 50% Injectable 25Gram(s) IV Push once  aspirin  chewable 81milliGRAM(s) Oral daily  insulin lispro (HumaLOG) corrective regimen sliding scale  SubCutaneous Before meals and at bedtime  metoprolol 25milliGRAM(s) Oral two times a day  metroNIDAZOLE  IVPB  IV Intermittent   metroNIDAZOLE  IVPB 500milliGRAM(s) IV Intermittent every 8 hours    MEDICATIONS  (PRN):  dextrose Gel 1Dose(s) Oral once PRN Blood Glucose LESS THAN 70 milliGRAM(s)/deciliter  acetaminophen   Tablet 650milliGRAM(s) Oral every 6 hours PRN For Temp greater than 38 C (100.4 F)    PHYSICAL EXAM:      T(C): 36.5, Max: 37.1 (04-06 @ 17:00)  HR: 58 (58 - 83)  BP: 181/58 (152/46 - 181/58)  RR: 17 (16 - 18)  SpO2: 100% (95% - 100%)  Wt(kg): --  Respiratory: clear anteriorly, decreased BS at bases  Cardiovascular: S1 S2  Gastrointestinal: soft NT ND +BS  Extremities:    1 + edema                                    9.1    7.9   )-----------( 302      ( 07 Apr 2017 08:06 )             30.0     04-07    143  |  104  |  18  ----------------------------<  106<H>  3.8   |  29  |  4.22<H>    Ca    8.3<L>      07 Apr 2017 08:06  Phos  3.4     04-06  Mg     2.5     04-06            Assessment and Plan:    ESRD for HD tomorrow;outpatient MWF.  Cardio follow up; discharge planning.

## 2017-04-07 NOTE — PROGRESS NOTE ADULT - SUBJECTIVE AND OBJECTIVE BOX
Patient is a 86y old  Female who presents with a chief complaint of Daughter stated that patient was unresponsive at home. (02 Apr 2017 15:53)      INTERVAL HPI/OVERNIGHT EVENTS:  flexiceal fell out  no further diarrhea, pt. voices no complaints    MEDICATIONS  (STANDING):  atorvastatin 40milliGRAM(s) Oral at bedtime  clopidogrel Tablet 75milliGRAM(s) Oral daily  insulin glargine Injectable (LANTUS) 6Unit(s) SubCutaneous at bedtime  dextrose 5%. 1000milliLiter(s) IV Continuous <Continuous>  dextrose 50% Injectable 12.5Gram(s) IV Push once  dextrose 50% Injectable 25Gram(s) IV Push once  dextrose 50% Injectable 25Gram(s) IV Push once  aspirin  chewable 81milliGRAM(s) Oral daily  insulin lispro (HumaLOG) corrective regimen sliding scale  SubCutaneous Before meals and at bedtime  metoprolol 25milliGRAM(s) Oral two times a day  metroNIDAZOLE  IVPB  IV Intermittent   metroNIDAZOLE  IVPB 500milliGRAM(s) IV Intermittent every 8 hours  amLODIPine   Tablet 10milliGRAM(s) Oral daily  heparin  Injectable 5000Unit(s) SubCutaneous every 12 hours    MEDICATIONS  (PRN):  dextrose Gel 1Dose(s) Oral once PRN Blood Glucose LESS THAN 70 milliGRAM(s)/deciliter  acetaminophen   Tablet 650milliGRAM(s) Oral every 6 hours PRN For Temp greater than 38 C (100.4 F)      Allergies    No Known Allergies    Intolerances        REVIEW OF SYSTEMS:  denies complaints, but not reliable historian    Vital Signs Last 24 Hrs  T(C): 36.5, Max: 37.1 (04-07 @ 05:32)  T(F): 97.7, Max: 98.8 (04-07 @ 05:32)  HR: 58 (58 - 83)  BP: 181/58 (158/44 - 181/58)  BP(mean): --  RR: 17 (16 - 17)  SpO2: 100% (98% - 100%)    PHYSICAL EXAM  GEN: alert, confused, pleasant  HEAD:  Atraumatic, Normocephalic  EYES: EOMI, PERRLA, conjunctiva and sclera clear  ENMT: No tonsillar erythema, exudates, or enlargement; Moist mucous membranes,  NECK: Supple, No JVD, Normal thyroid  NERVOUS SYSTEM:  Alert confused,  Motor Strength 4/5 B/L upper and lower extremities; DTRs 2+ intact and symmetric  CHEST/LUNG: decreased BS b/l  HEART: Regular rate and rhythm; No murmurs, rubs, or gallops  ABDOMEN: Soft, Nontender, Nondistended; Bowel sounds present  EXTREMITIES:  2+ Peripheral Pulses, No clubbing, cyanosis, or edema  LYMPH: No lymphadenopathy noted  SKIN: No rashes or lesions    LABS:                        9.1    7.9   )-----------( 302      ( 07 Apr 2017 08:06 )             30.0     04-07    143  |  104  |  18  ----------------------------<  106<H>  3.8   |  29  |  4.22<H>    Ca    8.3<L>      07 Apr 2017 08:06  Phos  3.4     04-06  Mg     2.5     04-06          CAPILLARY BLOOD GLUCOSE  151 (07 Apr 2017 16:45)  151 (07 Apr 2017 11:44)  106 (07 Apr 2017 08:02)  120 (06 Apr 2017 22:06)      RADIOLOGY & ADDITIONAL TESTS:    Imaging Personally Reviewed:  [ x] YES  [ ] NO    Consultant(s) Notes Reviewed:  [ x] YES  [ ] NO    Care Discussed with Consultants/Other Providers [x ] YES  [ ] NO

## 2017-04-07 NOTE — PROGRESS NOTE ADULT - ASSESSMENT
85 yo F with multiple medical problems, ESRD on HD ,  seizure disorder, CAD (s/p stent about 10 years ago), systolic heart failure, DM (type 2 - on lantus), hypothryoid, initially presented with weakness/lethargy/AMS found to had acute hypoxic respiratory failure s/p intubation, acute on chronic systolic heart failure,NSTEMI, hypotension requiring pressors secondary to cardiogenic shock vs septic shock , being treated empirically for presumed gram negative pneumonia. Pt. off pressors, extubated transferred to tele floor, recurrent watery diarrhea resolving

## 2017-04-07 NOTE — PROGRESS NOTE ADULT - SUBJECTIVE AND OBJECTIVE BOX
HPI:  Pt. is a 85 y/o female w/pmhx of HTN, DM-2 (on lantus 16u qhs), Seizure d/o (not taking keppra), ESRD on HD m,w,f was in [facility] till friday while in HD pt pulled catheter and later was told there was fistula clot, went to vascular yesterday andafte had full HD, son states after HD pt felt weak. states she was just not herself and brings her in, no seizure like activity noted, no cp, palitations n/v/d/c no fever or chills. (02 Apr 2017 05:01)      Allergies    No Known Allergies    Intolerances        MEDICATIONS  (STANDING):  atorvastatin 40milliGRAM(s) Oral at bedtime  clopidogrel Tablet 75milliGRAM(s) Oral daily  insulin glargine Injectable (LANTUS) 6Unit(s) SubCutaneous at bedtime  dextrose 5%. 1000milliLiter(s) IV Continuous <Continuous>  dextrose 50% Injectable 12.5Gram(s) IV Push once  dextrose 50% Injectable 25Gram(s) IV Push once  dextrose 50% Injectable 25Gram(s) IV Push once  aspirin  chewable 81milliGRAM(s) Oral daily  insulin lispro (HumaLOG) corrective regimen sliding scale  SubCutaneous Before meals and at bedtime  metoprolol 25milliGRAM(s) Oral two times a day  metroNIDAZOLE  IVPB  IV Intermittent   metroNIDAZOLE  IVPB 500milliGRAM(s) IV Intermittent every 8 hours  amLODIPine   Tablet 10milliGRAM(s) Oral daily  heparin  Injectable 5000Unit(s) SubCutaneous every 12 hours    MEDICATIONS  (PRN):  dextrose Gel 1Dose(s) Oral once PRN Blood Glucose LESS THAN 70 milliGRAM(s)/deciliter  acetaminophen   Tablet 650milliGRAM(s) Oral every 6 hours PRN For Temp greater than 38 C (100.4 F)      REVIEW OF SYSTEMS:    CONSTITUTIONAL: No fever, chills, weight loss, or fatigue  HEENT: No sore throat, runny nose, ear ache  RESPIRATORY: No cough, wheezing, No shortness of breath  CARDIOVASCULAR: No chest pain, palpitations, dizziness  GASTROINTESTINAL: No abdominal pain. No nausea, vomiting, diarrhea  GENITOURINARY: No dysuria, increase frequency, hematuria, or incontinence  NEUROLOGICAL: No headaches, memory loss, loss of strength, numbness, or tremors, no weakness  EXTREMITY: No pedal edema BLE  SKIN: No itching, burning, rashes, or lesions     VITAL SIGNS:  T(C): 36.3, Max: 37.1 (04-07 @ 05:32)  T(F): 97.4, Max: 98.8 (04-07 @ 05:32)  HR: 62 (58 - 83)  BP: 162/67 (158/44 - 181/58)  RR: 17 (16 - 17)  SpO2: 95% (95% - 100%)  Wt(kg): --    PHYSICAL EXAM:    GENERAL: not in any distress  HEENT: Neck is supple, normocephalic, atraumatic   CHEST/LUNG: Clear to percussion bilaterally; No rales, rhonchi, wheezing  HEART: Regular rate and rhythm; No murmurs, rubs, or gallops  ABDOMEN: Soft, Nontender, Nondistended; Bowel sounds present, no rebound   EXTREMITIES:  2+ Peripheral Pulses, No clubbing, cyanosis, or edema  GENITOURINARY:   SKIN: No rashes or lesions  BACK: no pressor sore   NERVOUS SYSTEM:  Alert & Oriented X3, Good concentration  PSYCH: normal affect     LABS:                         9.1    7.9   )-----------( 302      ( 07 Apr 2017 08:06 )             30.0     04-07    143  |  104  |  18  ----------------------------<  106<H>  3.8   |  29  |  4.22<H>    Ca    8.3<L>      07 Apr 2017 08:06  Phos  3.4     04-06  Mg     2.5     04-06              CARDIAC MARKERS ( 06 Apr 2017 19:59 )  1.570 ng/mL / x     / x     / x     / x                  Vancomycin Level, Trough: 16.5 ug/mL (04-06 @ 06:20)      C Diff by PCR Result: NotDetec (04-07 @ 12:39)    Culture Results:   No growth (04-03 @ 08:24)  Culture Results:   No growth at 5 days. (04-02 @ 10:21)  Culture Results:   No growth at 5 days. (04-02 @ 10:21)  Culture Results:   No growth (04-02 @ 10:15)                Radiology: HPI:  Pt. is a 85 y/o female w/pmhx of HTN, DM-2 (on lantus 16u qhs), Seizure d/o (not taking keppra), ESRD on HD m,w,f was in [facility] till friday while in HD pt pulled catheter and later was told there was fistula clot, went to vascular yesterday andafte had full HD, son states after HD pt felt weak. states she was just not herself and brings her in, no seizure like activity noted, no cp, palitations n/v/d/c no fever or chills. (02 Apr 2017 05:01)  stabilized   i saw adm day 5 antibiotics dcd   off antibiotics and on flagyl per primary care physician for diarrhea    Allergies    No Known Allergies    Intolerances        MEDICATIONS  (STANDING):  atorvastatin 40milliGRAM(s) Oral at bedtime  clopidogrel Tablet 75milliGRAM(s) Oral daily  insulin glargine Injectable (LANTUS) 6Unit(s) SubCutaneous at bedtime  dextrose 5%. 1000milliLiter(s) IV Continuous <Continuous>  dextrose 50% Injectable 12.5Gram(s) IV Push once  dextrose 50% Injectable 25Gram(s) IV Push once  dextrose 50% Injectable 25Gram(s) IV Push once  aspirin  chewable 81milliGRAM(s) Oral daily  insulin lispro (HumaLOG) corrective regimen sliding scale  SubCutaneous Before meals and at bedtime  metoprolol 25milliGRAM(s) Oral two times a day  metroNIDAZOLE  IVPB  IV Intermittent   metroNIDAZOLE  IVPB 500milliGRAM(s) IV Intermittent every 8 hours  amLODIPine   Tablet 10milliGRAM(s) Oral daily  heparin  Injectable 5000Unit(s) SubCutaneous every 12 hours    MEDICATIONS  (PRN):  dextrose Gel 1Dose(s) Oral once PRN Blood Glucose LESS THAN 70 milliGRAM(s)/deciliter  acetaminophen   Tablet 650milliGRAM(s) Oral every 6 hours PRN For Temp greater than 38 C (100.4 F)      REVIEW OF SYSTEMS:    diarrhea is better   no pain    no family today   difficult to communicate    VITAL SIGNS:  T(C): 36.3, Max: 37.1 (04-07 @ 05:32)  T(F): 97.4, Max: 98.8 (04-07 @ 05:32)  HR: 62 (58 - 83)  BP: 162/67 (158/44 - 181/58)  RR: 17 (16 - 17)  SpO2: 95% (95% - 100%)  Wt(kg): --    PHYSICAL EXAM:    GENERAL: not in any distress  HEENT: Neck is supple, normocephalic, atraumatic   CHEST/LUNG: Clear to percussion bilaterally; No rales, rhonchi, wheezing  HEART: Regular rate and rhythm; No murmurs, rubs, or gallops  ABDOMEN: Soft, Nontender, Nondistended; Bowel sounds present, no rebound   EXTREMITIES:  2+ Peripheral Pulses, No clubbing, cyanosis, or edema  SKIN: No rashes or lesions  BACK: no pressor sore   NERVOUS SYSTEM:  Alert & Oriented X2    LABS:                         9.1    7.9   )-----------( 302      ( 07 Apr 2017 08:06 )             30.0     04-07    143  |  104  |  18  ----------------------------<  106<H>  3.8   |  29  |  4.22<H>    Ca    8.3<L>      07 Apr 2017 08:06  Phos  3.4     04-06  Mg     2.5     04-06              CARDIAC MARKERS ( 06 Apr 2017 19:59 )  1.570 ng/mL / x     / x     / x     / x                  Vancomycin Level, Trough: 16.5 ug/mL (04-06 @ 06:20)      C Diff by PCR Result: NotDetec (04-07 @ 12:39)    Culture Results:   No growth (04-03 @ 08:24)  Culture Results:   No growth at 5 days. (04-02 @ 10:21)  Culture Results:   No growth at 5 days. (04-02 @ 10:21)  Culture Results:   No growth (04-02 @ 10:15)                Radiology:

## 2017-04-07 NOTE — PROGRESS NOTE ADULT - ASSESSMENT
85 yo F with multiple medical problems, ESRD on HD ,  seizure disorder, CAD (s/p stent about 10 years ago), systolic heart failure, DM (type 2 - on lantus), hypothryoid, initially presented with weakness/lethargy/AMS found to had acute hypoxic respiratory failure s/p intubation, acute on chronic systolic heart failure,NSTEMI, hypotension requiring pressors secondary to cardiogenic shock vs septic shock , being treated empirically for presumed gram negative pneumonia. Pt. off pressors, extubated transferred to tele floor,   recurrent watery diarrhea today ; flagyl started by primary care physician noted   day 5 of broad spectrum antibiotics   will dc and watch   follow closely   appears more congestive heart failure VS community acquired pneumonia from all notes; no fevers   follow up chest xray am   follow up stool for clostridium difficille infection 87 yo F with multiple medical problems, ESRD on HD ,  seizure disorder, CAD (s/p stent about 10 years ago), systolic heart failure, DM (type 2 - on lantus), hypothryoid, initially presented with weakness/lethargy/AMS found to had acute hypoxic respiratory failure s/p intubation, acute on chronic systolic heart failure,NSTEMI, hypotension requiring pressors secondary to cardiogenic shock vs septic shock , being treated empirically for presumed gram negative pneumonia. Pt. off pressors, extubated transferred to tele floor,   recurrent watery diarrhea today ; flagyl started by primary care physician noted   day 5 of broad spectrum antibiotics   will dc and watch   follow closely   appears more congestive heart failure VS community acquired pneumonia from all notes; no fevers   follow up chest xray am    stool for clostridium difficille infection NEGATIVE

## 2017-04-07 NOTE — PROGRESS NOTE ADULT - PROBLEM SELECTOR PLAN 1
s/p extubation, resp status stable on oxygen, recorded O2 of 100% on RA  c/w dialysis to keep fluid status stable

## 2017-04-08 LAB
ANION GAP SERPL CALC-SCNC: 10 MMOL/L — SIGNIFICANT CHANGE UP (ref 5–17)
BASOPHILS # BLD AUTO: 0.2 K/UL — SIGNIFICANT CHANGE UP (ref 0–0.2)
BASOPHILS NFR BLD AUTO: 1.9 % — SIGNIFICANT CHANGE UP (ref 0–2)
BUN SERPL-MCNC: 28 MG/DL — HIGH (ref 7–23)
CALCIUM SERPL-MCNC: 8.7 MG/DL — SIGNIFICANT CHANGE UP (ref 8.5–10.1)
CHLORIDE SERPL-SCNC: 103 MMOL/L — SIGNIFICANT CHANGE UP (ref 96–108)
CO2 SERPL-SCNC: 27 MMOL/L — SIGNIFICANT CHANGE UP (ref 22–31)
CREAT SERPL-MCNC: 5.53 MG/DL — HIGH (ref 0.5–1.3)
EOSINOPHIL # BLD AUTO: 1 K/UL — HIGH (ref 0–0.5)
EOSINOPHIL NFR BLD AUTO: 12 % — HIGH (ref 0–6)
GLUCOSE SERPL-MCNC: 104 MG/DL — HIGH (ref 70–99)
HCT VFR BLD CALC: 30 % — LOW (ref 34.5–45)
HGB BLD-MCNC: 9.6 G/DL — LOW (ref 11.5–15.5)
LYMPHOCYTES # BLD AUTO: 2.2 K/UL — SIGNIFICANT CHANGE UP (ref 1–3.3)
LYMPHOCYTES # BLD AUTO: 26.1 % — SIGNIFICANT CHANGE UP (ref 13–44)
MCHC RBC-ENTMCNC: 28.6 PG — SIGNIFICANT CHANGE UP (ref 27–34)
MCHC RBC-ENTMCNC: 32.2 GM/DL — SIGNIFICANT CHANGE UP (ref 32–36)
MCV RBC AUTO: 89 FL — SIGNIFICANT CHANGE UP (ref 80–100)
MONOCYTES # BLD AUTO: 1.1 K/UL — HIGH (ref 0–0.9)
MONOCYTES NFR BLD AUTO: 13.3 % — SIGNIFICANT CHANGE UP (ref 2–14)
NEUTROPHILS # BLD AUTO: 4 K/UL — SIGNIFICANT CHANGE UP (ref 1.8–7.4)
NEUTROPHILS NFR BLD AUTO: 46.7 % — SIGNIFICANT CHANGE UP (ref 43–77)
PLATELET # BLD AUTO: 287 K/UL — SIGNIFICANT CHANGE UP (ref 150–400)
POTASSIUM SERPL-MCNC: 3.7 MMOL/L — SIGNIFICANT CHANGE UP (ref 3.5–5.3)
POTASSIUM SERPL-SCNC: 3.7 MMOL/L — SIGNIFICANT CHANGE UP (ref 3.5–5.3)
RBC # BLD: 3.37 M/UL — LOW (ref 3.8–5.2)
RBC # FLD: 19.5 % — HIGH (ref 11–15)
SODIUM SERPL-SCNC: 140 MMOL/L — SIGNIFICANT CHANGE UP (ref 135–145)
WBC # BLD: 8.5 K/UL — SIGNIFICANT CHANGE UP (ref 3.8–10.5)
WBC # FLD AUTO: 8.5 K/UL — SIGNIFICANT CHANGE UP (ref 3.8–10.5)

## 2017-04-08 PROCEDURE — 99232 SBSQ HOSP IP/OBS MODERATE 35: CPT

## 2017-04-08 RX ORDER — METRONIDAZOLE 500 MG
500 TABLET ORAL EVERY 8 HOURS
Qty: 0 | Refills: 0 | Status: DISCONTINUED | OUTPATIENT
Start: 2017-04-08 | End: 2017-04-11

## 2017-04-08 RX ORDER — CHOLESTYRAMINE 4 G/9G
4 POWDER, FOR SUSPENSION ORAL
Qty: 0 | Refills: 0 | Status: DISCONTINUED | OUTPATIENT
Start: 2017-04-08 | End: 2017-04-11

## 2017-04-08 RX ADMIN — HEPARIN SODIUM 5000 UNIT(S): 5000 INJECTION INTRAVENOUS; SUBCUTANEOUS at 05:09

## 2017-04-08 RX ADMIN — HEPARIN SODIUM 5000 UNIT(S): 5000 INJECTION INTRAVENOUS; SUBCUTANEOUS at 17:04

## 2017-04-08 RX ADMIN — Medication 100 MILLIGRAM(S): at 05:09

## 2017-04-08 RX ADMIN — INSULIN GLARGINE 6 UNIT(S): 100 INJECTION, SOLUTION SUBCUTANEOUS at 22:34

## 2017-04-08 RX ADMIN — Medication 6: at 17:04

## 2017-04-08 RX ADMIN — AMLODIPINE BESYLATE 10 MILLIGRAM(S): 2.5 TABLET ORAL at 05:09

## 2017-04-08 RX ADMIN — Medication 500 MILLIGRAM(S): at 22:33

## 2017-04-08 RX ADMIN — ATORVASTATIN CALCIUM 40 MILLIGRAM(S): 80 TABLET, FILM COATED ORAL at 22:33

## 2017-04-08 RX ADMIN — CLOPIDOGREL BISULFATE 75 MILLIGRAM(S): 75 TABLET, FILM COATED ORAL at 13:47

## 2017-04-08 RX ADMIN — Medication 500 MILLIGRAM(S): at 14:36

## 2017-04-08 RX ADMIN — Medication 25 MILLIGRAM(S): at 05:09

## 2017-04-08 RX ADMIN — Medication 81 MILLIGRAM(S): at 13:47

## 2017-04-08 RX ADMIN — CHOLESTYRAMINE 4 GRAM(S): 4 POWDER, FOR SUSPENSION ORAL at 21:49

## 2017-04-08 NOTE — PROGRESS NOTE ADULT - SUBJECTIVE AND OBJECTIVE BOX
Subjective: No specific complaints. No events reported by night nurse.       MEDICATIONS  (STANDING):  atorvastatin 40milliGRAM(s) Oral at bedtime  clopidogrel Tablet 75milliGRAM(s) Oral daily  insulin glargine Injectable (LANTUS) 6Unit(s) SubCutaneous at bedtime  dextrose 5%. 1000milliLiter(s) IV Continuous <Continuous>  dextrose 50% Injectable 12.5Gram(s) IV Push once  dextrose 50% Injectable 25Gram(s) IV Push once  dextrose 50% Injectable 25Gram(s) IV Push once  aspirin  chewable 81milliGRAM(s) Oral daily  insulin lispro (HumaLOG) corrective regimen sliding scale  SubCutaneous Before meals and at bedtime  metoprolol 25milliGRAM(s) Oral two times a day  metroNIDAZOLE  IVPB  IV Intermittent   metroNIDAZOLE  IVPB 500milliGRAM(s) IV Intermittent every 8 hours  amLODIPine   Tablet 10milliGRAM(s) Oral daily  heparin  Injectable 5000Unit(s) SubCutaneous every 12 hours    MEDICATIONS  (PRN):  dextrose Gel 1Dose(s) Oral once PRN Blood Glucose LESS THAN 70 milliGRAM(s)/deciliter  acetaminophen   Tablet 650milliGRAM(s) Oral every 6 hours PRN For Temp greater than 38 C (100.4 F)          T(C): 37.1, Max: 37.1 (04-08 @ 05:02)  HR: 68 (58 - 68)  BP: 162/52 (153/50 - 181/58)  RR: 17 (16 - 17)  SpO2: 98% (95% - 100%)  Wt(kg): --        I&O's Detail    I & Os for current day (as of 08 Apr 2017 07:53)  =============================================  IN:    Oral Fluid: 480 ml    Total IN: 480 ml  ---------------------------------------------  OUT:    Voided: 2 ml    Total OUT: 2 ml  ---------------------------------------------  Total NET: 478 ml           PHYSICAL EXAM:    GENERAL: anxious  EYES: EOMI, PERRLA, conjunctiva and sclera clear  NECK: Supple, no inc in JVP  CHEST/LUNG: Clear  HEART: S1S2  ABDOMEN: Soft, Nontender, Nondistended; Bowel sounds present  EXTREMITIES:  min edema  NEURO: no asterixis  ACCESS: L AVF pos thrill, bruit      LABS:  CBC Full  -  ( 08 Apr 2017 07:23 )  WBC Count : 8.5 K/uL  Hemoglobin : 9.6 g/dL  Hematocrit : 30.0 %  Platelet Count - Automated : 287 K/uL  Mean Cell Volume : 89.0 fl  Mean Cell Hemoglobin : 28.6 pg  Mean Cell Hemoglobin Concentration : 32.2 gm/dL  Auto Neutrophil # : 4.0 K/uL  Auto Lymphocyte # : 2.2 K/uL  Auto Monocyte # : 1.1 K/uL  Auto Eosinophil # : 1.0 K/uL  Auto Basophil # : 0.2 K/uL  Auto Neutrophil % : 46.7 %  Auto Lymphocyte % : 26.1 %  Auto Monocyte % : 13.3 %  Auto Eosinophil % : 12.0 %  Auto Basophil % : 1.9 %    04-07    143  |  104  |  18  ----------------------------<  106<H>  3.8   |  29  |  4.22<H>    Ca    8.3<L>      07 Apr 2017 08:06          ASSESSMENT and PLAN:    NSTEMI, s/p pulm edema, intubation/extubation, sepsis, diarrhea, HD dependent ESRD    * Maint HD today as Rxed. Goal UF 2kg. 3K bath

## 2017-04-08 NOTE — PROGRESS NOTE ADULT - SUBJECTIVE AND OBJECTIVE BOX
Assessment:    NSTEMI in setting of ESRD and sepsis, with a history of CAD and 2 stents in the past, DM  Medical management ongoing, ASA, Plavix,  Statin if not contraindicated  IV ABX  Echocardiogram reviewed  EKG reviewed and compared with previous from last hospital admission here, no acute changes noted.   CE trending down  Stress test  Monday, or before discharge  H/H noted

## 2017-04-08 NOTE — PROGRESS NOTE ADULT - ASSESSMENT
87 yo F with multiple medical problems, ESRD on HD ,  seizure disorder, CAD (s/p stent about 10 years ago), systolic heart failure, DM (type 2 - on lantus), hypothryoid, initially presented with weakness/lethargy/AMS found to had acute hypoxic respiratory failure s/p intubation, acute on chronic systolic heart failure,NSTEMI, hypotension requiring pressors secondary to cardiogenic shock vs septic shock , being treated empirically for presumed gram negative pneumonia. Pt. off pressors, extubated transferred to tele floor,   recurrent watery diarrhea today ; flagyl started by primary care physician noted   off antibiotics   on po flagyl and stable    no fevers and no leucocytosis 87 yo F with multiple medical problems, ESRD on HD ,  seizure disorder, CAD (s/p stent about 10 years ago), systolic heart failure, DM (type 2 - on lantus), hypothryoid, initially presented with weakness/lethargy/AMS found to had acute hypoxic respiratory failure s/p intubation, acute on chronic systolic heart failure,NSTEMI, hypotension requiring pressors secondary to cardiogenic shock vs septic shock , being treated empirically for presumed gram negative pneumonia. Pt. off pressors, extubated transferred to tele floor,   recurrent watery diarrhea today ; flagyl started by primary care physician noted   off antibiotics   on po flagyl and stable    no fevers and no leucocytosis  discharge plan per PMD

## 2017-04-08 NOTE — PROGRESS NOTE ADULT - SUBJECTIVE AND OBJECTIVE BOX
CC/F/U for:    INTERVAL HPI/OVERNIGHT EVENTS:  Pt seen and examined at bedside.     Allergies/Intolerance: No Known Allergies      MEDICATIONS  (STANDING):  atorvastatin 40milliGRAM(s) Oral at bedtime  clopidogrel Tablet 75milliGRAM(s) Oral daily  insulin glargine Injectable (LANTUS) 6Unit(s) SubCutaneous at bedtime  dextrose 5%. 1000milliLiter(s) IV Continuous <Continuous>  dextrose 50% Injectable 12.5Gram(s) IV Push once  dextrose 50% Injectable 25Gram(s) IV Push once  dextrose 50% Injectable 25Gram(s) IV Push once  aspirin  chewable 81milliGRAM(s) Oral daily  insulin lispro (HumaLOG) corrective regimen sliding scale  SubCutaneous Before meals and at bedtime  metoprolol 25milliGRAM(s) Oral two times a day  metroNIDAZOLE  IVPB  IV Intermittent   metroNIDAZOLE  IVPB 500milliGRAM(s) IV Intermittent every 8 hours  amLODIPine   Tablet 10milliGRAM(s) Oral daily  heparin  Injectable 5000Unit(s) SubCutaneous every 12 hours    MEDICATIONS  (PRN):  dextrose Gel 1Dose(s) Oral once PRN Blood Glucose LESS THAN 70 milliGRAM(s)/deciliter  acetaminophen   Tablet 650milliGRAM(s) Oral every 6 hours PRN For Temp greater than 38 C (100.4 F)        ROS: all systems reviewed and wnl      PHYSICAL EXAMINATION:  Vital Signs Last 24 Hrs  T(C): 36.8, Max: 37.1 (04-08 @ 05:02)  T(F): 98.2, Max: 98.8 (04-08 @ 05:02)  HR: 66 (61 - 68)  BP: 139/51 (137/56 - 171/58)  BP(mean): --  RR: 18 (16 - 18)  SpO2: 97% (95% - 99%)  CAPILLARY BLOOD GLUCOSE  101 (08 Apr 2017 07:38)  147 (07 Apr 2017 21:46)  151 (07 Apr 2017 16:45)      GENERAL: NAD, well-groomed, well-developed  HEAD:  atraumatic, normocephalic  EYES: sclera anicteric  ENMT: mucous membranes moist  NECK: supple, No JVD  CHEST/LUNG: clear to auscultation bilaterally; no rales, rhonchi, or wheezing b/l  HEART: normal S1, S2  ABDOMEN: BS+, soft, ND, NT   EXTREMITIES:  pulses palpable; no clubbing, cyanosis, or edema b/l LEs  NEURO: awake, alert, interactive; moves all extremities  SKIN: no rashes or lesions      LABS:                        9.6    8.5   )-----------( 287      ( 08 Apr 2017 07:23 )             30.0     04-08    140  |  103  |  28<H>  ----------------------------<  104<H>  3.7   |  27  |  5.53<H>    Ca    8.7      08 Apr 2017 07:23              RADIOLOGY & ADDITIONAL TESTS:      ASSESSMENT AND PLAN: CC/F/U for:    INTERVAL HPI/OVERNIGHT EVENTS:  Pt seen and examined at bedside.     Allergies/Intolerance: No Known Allergies      MEDICATIONS  (STANDING):  atorvastatin 40milliGRAM(s) Oral at bedtime  clopidogrel Tablet 75milliGRAM(s) Oral daily  insulin glargine Injectable (LANTUS) 6Unit(s) SubCutaneous at bedtime  dextrose 5%. 1000milliLiter(s) IV Continuous <Continuous>  dextrose 50% Injectable 12.5Gram(s) IV Push once  dextrose 50% Injectable 25Gram(s) IV Push once  dextrose 50% Injectable 25Gram(s) IV Push once  aspirin  chewable 81milliGRAM(s) Oral daily  insulin lispro (HumaLOG) corrective regimen sliding scale  SubCutaneous Before meals and at bedtime  metoprolol 25milliGRAM(s) Oral two times a day  metroNIDAZOLE  IVPB  IV Intermittent   metroNIDAZOLE  IVPB 500milliGRAM(s) IV Intermittent every 8 hours  amLODIPine   Tablet 10milliGRAM(s) Oral daily  heparin  Injectable 5000Unit(s) SubCutaneous every 12 hours    MEDICATIONS  (PRN):  dextrose Gel 1Dose(s) Oral once PRN Blood Glucose LESS THAN 70 milliGRAM(s)/deciliter  acetaminophen   Tablet 650milliGRAM(s) Oral every 6 hours PRN For Temp greater than 38 C (100.4 F)        ROS: all systems reviewed and wnl      PHYSICAL EXAMINATION:  Vital Signs Last 24 Hrs  T(C): 36.8, Max: 37.1 (04-08 @ 05:02)  T(F): 98.2, Max: 98.8 (04-08 @ 05:02)  HR: 66 (61 - 68)  BP: 139/51 (137/56 - 171/58)  BP(mean): --  RR: 18 (16 - 18)  SpO2: 97% (95% - 99%)  CAPILLARY BLOOD GLUCOSE  101 (08 Apr 2017 07:38)  147 (07 Apr 2017 21:46)  151 (07 Apr 2017 16:45)      GENERAL: NAD, well-groomed, well-developed  HEAD:  atraumatic, normocephalic  EYES: sclera anicteric  ENMT: mucous membranes moist  NECK: supple, No JVD  CHEST/LUNG: clear to auscultation bilaterally; no rales, rhonchi, or wheezing b/l  HEART: normal S1, S2  ABDOMEN: BS+, soft, ND, NT   EXTREMITIES:  pulses palpable; no clubbing, cyanosis, or edema b/l LEs  NEURO: awake, alert, interactive; moves all extremities  SKIN: no rashes or lesions      LABS:                        9.6    8.5   )-----------( 287      ( 08 Apr 2017 07:23 )             30.0     04-08    140  |  103  |  28<H>  ----------------------------<  104<H>  3.7   |  27  |  5.53<H>    Ca    8.7      08 Apr 2017 07:23              RADIOLOGY & ADDITIONAL TESTS:    ASSESSMENT AND PLAN : 86 year old female s/p ICU stay with intubation now on 2-D. Continue Norvasc 10 mg/day for BP, HD per renal for fluid balance.     Lipitor 40 mg /day with ASA and Plavix for CAD. Lantus 6 units/day for DM, renally dosed.     May need cardiac stress on Monday. Troponons trending down. Continue telemetry. CC/F/U for:    INTERVAL HPI/OVERNIGHT EVENTS:  Pt seen and examined at bedside.     Allergies/Intolerance: No Known Allergies      MEDICATIONS  (STANDING):  atorvastatin 40milliGRAM(s) Oral at bedtime  clopidogrel Tablet 75milliGRAM(s) Oral daily  insulin glargine Injectable (LANTUS) 6Unit(s) SubCutaneous at bedtime  dextrose 5%. 1000milliLiter(s) IV Continuous <Continuous>  dextrose 50% Injectable 12.5Gram(s) IV Push once  dextrose 50% Injectable 25Gram(s) IV Push once  dextrose 50% Injectable 25Gram(s) IV Push once  aspirin  chewable 81milliGRAM(s) Oral daily  insulin lispro (HumaLOG) corrective regimen sliding scale  SubCutaneous Before meals and at bedtime  metoprolol 25milliGRAM(s) Oral two times a day  metroNIDAZOLE  IVPB  IV Intermittent   metroNIDAZOLE  IVPB 500milliGRAM(s) IV Intermittent every 8 hours  amLODIPine   Tablet 10milliGRAM(s) Oral daily  heparin  Injectable 5000Unit(s) SubCutaneous every 12 hours    MEDICATIONS  (PRN):  dextrose Gel 1Dose(s) Oral once PRN Blood Glucose LESS THAN 70 milliGRAM(s)/deciliter  acetaminophen   Tablet 650milliGRAM(s) Oral every 6 hours PRN For Temp greater than 38 C (100.4 F)        ROS: all systems reviewed and wnl      PHYSICAL EXAMINATION:  Vital Signs Last 24 Hrs  T(C): 36.8, Max: 37.1 (04-08 @ 05:02)  T(F): 98.2, Max: 98.8 (04-08 @ 05:02)  HR: 66 (61 - 68)  BP: 139/51 (137/56 - 171/58)  BP(mean): --  RR: 18 (16 - 18)  SpO2: 97% (95% - 99%)  CAPILLARY BLOOD GLUCOSE  101 (08 Apr 2017 07:38)  147 (07 Apr 2017 21:46)  151 (07 Apr 2017 16:45)      GENERAL: NAD, well-groomed, well-developed  HEAD:  atraumatic, normocephalic  EYES: sclera anicteric  ENMT: mucous membranes moist  NECK: supple, No JVD  CHEST/LUNG: clear to auscultation bilaterally; no rales, rhonchi, or wheezing b/l  HEART: normal S1, S2  ABDOMEN: BS+, soft, ND, NT   EXTREMITIES:  pulses palpable; no clubbing, cyanosis, or edema b/l LEs  NEURO: awake, alert, interactive; moves all extremities  SKIN: no rashes or lesions      LABS:                        9.6    8.5   )-----------( 287      ( 08 Apr 2017 07:23 )             30.0     04-08    140  |  103  |  28<H>  ----------------------------<  104<H>  3.7   |  27  |  5.53<H>    Ca    8.7      08 Apr 2017 07:23              RADIOLOGY & ADDITIONAL TESTS:    ASSESSMENT AND PLAN : 86 year old female s/p ICU stay with intubation now on 2-D. Continue Norvasc 10 mg/day for BP, HD per renal for fluid balance.     IV Flagyl for diarrhea. Lipitor 40 mg /day with ASA and Plavix for CAD. Lantus 6 units/day for DM, renally dosed.     May need cardiac stress on Monday. Troponons trending down. Continue telemetry. CC/F/U for:    INTERVAL HPI/OVERNIGHT EVENTS:  Pt seen and examined at bedside.     Allergies/Intolerance: No Known Allergies      MEDICATIONS  (STANDING):  atorvastatin 40milliGRAM(s) Oral at bedtime  clopidogrel Tablet 75milliGRAM(s) Oral daily  insulin glargine Injectable (LANTUS) 6Unit(s) SubCutaneous at bedtime  dextrose 5%. 1000milliLiter(s) IV Continuous <Continuous>  dextrose 50% Injectable 12.5Gram(s) IV Push once  dextrose 50% Injectable 25Gram(s) IV Push once  dextrose 50% Injectable 25Gram(s) IV Push once  aspirin  chewable 81milliGRAM(s) Oral daily  insulin lispro (HumaLOG) corrective regimen sliding scale  SubCutaneous Before meals and at bedtime  metoprolol 25milliGRAM(s) Oral two times a day  metroNIDAZOLE  IVPB  IV Intermittent   metroNIDAZOLE  IVPB 500milliGRAM(s) IV Intermittent every 8 hours  amLODIPine   Tablet 10milliGRAM(s) Oral daily  heparin  Injectable 5000Unit(s) SubCutaneous every 12 hours    MEDICATIONS  (PRN):  dextrose Gel 1Dose(s) Oral once PRN Blood Glucose LESS THAN 70 milliGRAM(s)/deciliter  acetaminophen   Tablet 650milliGRAM(s) Oral every 6 hours PRN For Temp greater than 38 C (100.4 F)        ROS: all systems reviewed and wnl      PHYSICAL EXAMINATION:  Vital Signs Last 24 Hrs  T(C): 36.8, Max: 37.1 (04-08 @ 05:02)  T(F): 98.2, Max: 98.8 (04-08 @ 05:02)  HR: 66 (61 - 68)  BP: 139/51 (137/56 - 171/58)  BP(mean): --  RR: 18 (16 - 18)  SpO2: 97% (95% - 99%)  CAPILLARY BLOOD GLUCOSE  101 (08 Apr 2017 07:38)  147 (07 Apr 2017 21:46)  151 (07 Apr 2017 16:45)      GENERAL: NAD, well-groomed, well-developed  HEAD:  atraumatic, normocephalic  EYES: sclera anicteric  ENMT: mucous membranes moist  NECK: supple, No JVD  CHEST/LUNG: clear to auscultation bilaterally; no rales, rhonchi, or wheezing b/l  HEART: normal S1, S2  ABDOMEN: BS+, soft, ND, NT   EXTREMITIES:  pulses palpable; no clubbing, cyanosis, or edema b/l LEs  NEURO: awake, alert, interactive; moves all extremities  SKIN: no rashes or lesions      LABS:                        9.6    8.5   )-----------( 287      ( 08 Apr 2017 07:23 )             30.0     04-08    140  |  103  |  28<H>  ----------------------------<  104<H>  3.7   |  27  |  5.53<H>    Ca    8.7      08 Apr 2017 07:23              RADIOLOGY & ADDITIONAL TESTS:    ASSESSMENT AND PLAN : 86 year old female s/p ICU stay with intubation now on 2-D. Continue Norvasc 10 mg/day for BP, HD per renal for fluid balance.     Flagyl for diarrhea, change to oral regimen. Lipitor 40 mg /day with ASA and Plavix for CAD. Lantus 6 units/day for DM, renally dosed.     May need cardiac stress on Monday. Troponons trending down. Continue telemetry. CC/F/U for: Comfortable in bed after HD session. Eating lunch. Daughter at bedside. No further CP, SOB, Abdominal pain.     INTERVAL HPI/OVERNIGHT EVENTS:  Pt seen and examined at bedside.     Allergies/Intolerance: No Known Allergies      MEDICATIONS  (STANDING):  atorvastatin 40milliGRAM(s) Oral at bedtime  clopidogrel Tablet 75milliGRAM(s) Oral daily  insulin glargine Injectable (LANTUS) 6Unit(s) SubCutaneous at bedtime  dextrose 5%. 1000milliLiter(s) IV Continuous <Continuous>  dextrose 50% Injectable 12.5Gram(s) IV Push once  dextrose 50% Injectable 25Gram(s) IV Push once  dextrose 50% Injectable 25Gram(s) IV Push once  aspirin  chewable 81milliGRAM(s) Oral daily  insulin lispro (HumaLOG) corrective regimen sliding scale  SubCutaneous Before meals and at bedtime  metoprolol 25milliGRAM(s) Oral two times a day  metroNIDAZOLE  IVPB  IV Intermittent   metroNIDAZOLE  IVPB 500milliGRAM(s) IV Intermittent every 8 hours  amLODIPine   Tablet 10milliGRAM(s) Oral daily  heparin  Injectable 5000Unit(s) SubCutaneous every 12 hours    MEDICATIONS  (PRN):  dextrose Gel 1Dose(s) Oral once PRN Blood Glucose LESS THAN 70 milliGRAM(s)/deciliter  acetaminophen   Tablet 650milliGRAM(s) Oral every 6 hours PRN For Temp greater than 38 C (100.4 F)        ROS: all systems reviewed and wnl      PHYSICAL EXAMINATION:  Vital Signs Last 24 Hrs  T(C): 36.8, Max: 37.1 (04-08 @ 05:02)  T(F): 98.2, Max: 98.8 (04-08 @ 05:02)  HR: 66 (61 - 68)  BP: 139/51 (137/56 - 171/58)  BP(mean): --  RR: 18 (16 - 18)  SpO2: 97% (95% - 99%)  CAPILLARY BLOOD GLUCOSE  101 (08 Apr 2017 07:38)  147 (07 Apr 2017 21:46)  151 (07 Apr 2017 16:45)      GENERAL: NAD, well-groomed, well-developed  HEAD:  atraumatic, normocephalic  EYES: sclera anicteric  ENMT: mucous membranes moist  NECK: supple, No JVD  CHEST/LUNG: clear to auscultation bilaterally; no rales, rhonchi, or wheezing b/l  HEART: normal S1, S2  ABDOMEN: BS+, soft, ND, NT   EXTREMITIES:  pulses palpable; no clubbing, cyanosis, or edema b/l LEs  NEURO: awake, alert, interactive; moves all extremities  SKIN: no rashes or lesions      LABS:                        9.6    8.5   )-----------( 287      ( 08 Apr 2017 07:23 )             30.0     04-08    140  |  103  |  28<H>  ----------------------------<  104<H>  3.7   |  27  |  5.53<H>    Ca    8.7      08 Apr 2017 07:23              RADIOLOGY & ADDITIONAL TESTS:    ASSESSMENT AND PLAN : 86 year old female s/p ICU stay with intubation now on 2-E. Continue Norvasc 10 mg/day for BP, HD per renal for fluid balance.     Flagyl for diarrhea, change to oral Flagyl today. Lipitor 40 mg /day with ASA and Plavix for ASHD. Lantus 6 units/day for DM, renally dosed, works well now.      May need cardiac stress on Monday. Troponins trending down. Continue overall supportive care.

## 2017-04-08 NOTE — PROGRESS NOTE ADULT - ASSESSMENT
Pt. is a 85 y/o female w/pmhx of HTN, DM-2 (on lantus 16u qhs), Seizure d/o (not taking keppra), ESRD on HD m,w,f was in [facility] till friday while in HD pt pulled catheter and later was told there was fistula clot, went to vascular yesterday andafte had full HD, son states after HD pt felt weak. states she was just not herself and brings her in, no seizure like activity noted, no cp, palitations n/v/d/c no fever or chills. (02 Apr 2017 05:01)  Chart reviewed. Patient admitted with septic shock and NSTEMI  Consult callled for diarrhea. Patient was in ICU with flexiceal.  The average out put was 300 ml. Patient was on antibiotics. C diff, cx - negative.  Today, flexiceal was removed  @ 11:30 AND HAS NOT HAD A BOWEL MOVEMENT SINCE. ( 3 PM ). Patient now only on IV Flagyl ) Stool studies pending. Patient is eating solid foods. The patient denies any history of melena, hematochezia, hematemesis, nausea, vomiting, abdominal pain, constipation, diarrhea, or change in bowel movements.  She has never had a colonoscopy.  Diarrhea - 1) check stool studies 2) stool count 3) will hold off on any procedures at present time 4) maintain same diet.  Will follow.

## 2017-04-08 NOTE — PROGRESS NOTE ADULT - SUBJECTIVE AND OBJECTIVE BOX
Pt. is a 87 y/o female w/pmhx of HTN, DM-2 (on lantus 16u qhs), Seizure d/o (not taking keppra), ESRD on HD m,w,f was in [facility] till friday while in HD pt pulled catheter and later was told there was fistula clot, went to vascular yesterday andafte had full HD, son states after HD pt felt weak. states she was just not herself and brings her in, no seizure like activity noted, no cp, palitations n/v/d/c no fever or chills. (02 Apr 2017 05:01)  here ac resp failure intubated extubated   very poor historian  out of icu   diarrhea yesterday   on flagyl and better    Allergies    No Known Allergies    Intolerances        MEDICATIONS  (STANDING):  atorvastatin 40milliGRAM(s) Oral at bedtime  clopidogrel Tablet 75milliGRAM(s) Oral daily  insulin glargine Injectable (LANTUS) 6Unit(s) SubCutaneous at bedtime  dextrose 5%. 1000milliLiter(s) IV Continuous <Continuous>  dextrose 50% Injectable 12.5Gram(s) IV Push once  dextrose 50% Injectable 25Gram(s) IV Push once  dextrose 50% Injectable 25Gram(s) IV Push once  aspirin  chewable 81milliGRAM(s) Oral daily  insulin lispro (HumaLOG) corrective regimen sliding scale  SubCutaneous Before meals and at bedtime  metoprolol 25milliGRAM(s) Oral two times a day  amLODIPine   Tablet 10milliGRAM(s) Oral daily  heparin  Injectable 5000Unit(s) SubCutaneous every 12 hours  metroNIDAZOLE    Tablet 500milliGRAM(s) Oral every 8 hours    MEDICATIONS  (PRN):  dextrose Gel 1Dose(s) Oral once PRN Blood Glucose LESS THAN 70 milliGRAM(s)/deciliter  acetaminophen   Tablet 650milliGRAM(s) Oral every 6 hours PRN For Temp greater than 38 C (100.4 F)      REVIEW OF SYSTEMS:    no issues     VITAL SIGNS:  T(C): 36.7, Max: 37.1 (04-08 @ 05:02)  T(F): 98, Max: 98.8 (04-08 @ 05:02)  HR: 70 (61 - 74)  BP: 117/34 (117/34 - 171/58)  RR: 18 (16 - 18)  SpO2: 98% (97% - 99%)  Wt(kg): --    PHYSICAL EXAM:    GENERAL: not in any distress  HEENT: Neck is supple, normocephalic, atraumatic   CHEST/LUNG: Clear to percussion bilaterally; No rales, rhonchi, wheezing  HEART: Regular rate and rhythm; No murmurs, rubs, or gallops  ABDOMEN: Soft, Nontender, Nondistended; Bowel sounds present, no rebound   EXTREMITIES:  2+ Peripheral Pulses, No clubbing, cyanosis, or edema  SKIN: No rashes or lesions  BACK: no pressor sore   NERVOUS SYSTEM:  Alert & Oriented X3,   LABS:                         9.6    8.5   )-----------( 287      ( 08 Apr 2017 07:23 )             30.0     04-08    140  |  103  |  28<H>  ----------------------------<  104<H>  3.7   |  27  |  5.53<H>    Ca    8.7      08 Apr 2017 07:23              CARDIAC MARKERS ( 06 Apr 2017 19:59 )  1.570 ng/mL / x     / x     / x     / x                  Vancomycin Level, Trough: 16.5 ug/mL (04-06 @ 06:20)      C Diff by PCR Result: NotDetec (04-07 @ 12:39)    Culture Results:   No enteric pathogens to date: Final culture pending  No enteric gram negative rods isolated (04-07 @ 12:21)  Culture Results:   Testing in progress (04-07 @ 12:08)  Culture Results:   No growth (04-03 @ 08:24)  Culture Results:   No growth at 5 days. (04-02 @ 10:21)  Culture Results:   No growth at 5 days. (04-02 @ 10:21)  Culture Results:   No growth (04-02 @ 10:15)                Radiology: Pt. is a 85 y/o female w/pmhx of HTN, DM-2 (on lantus 16u qhs), Seizure d/o (not taking keppra), ESRD on HD m,w,f was in [facility] till friday while in HD pt pulled catheter and later was told there was fistula clot, went to vascular yesterday andafte had full HD, son states after HD pt felt weak. states she was just not herself and brings her in, no seizure like activity noted, no cp, palitations n/v/d/c no fever or chills. (02 Apr 2017 05:01)  here ac resp failure intubated extubated   very poor historian  out of icu   diarrhea yesterday   on flagyl and better    Allergies    No Known Allergies    Intolerances        MEDICATIONS  (STANDING):  atorvastatin 40milliGRAM(s) Oral at bedtime  clopidogrel Tablet 75milliGRAM(s) Oral daily  insulin glargine Injectable (LANTUS) 6Unit(s) SubCutaneous at bedtime  dextrose 5%. 1000milliLiter(s) IV Continuous <Continuous>  dextrose 50% Injectable 12.5Gram(s) IV Push once  dextrose 50% Injectable 25Gram(s) IV Push once  dextrose 50% Injectable 25Gram(s) IV Push once  aspirin  chewable 81milliGRAM(s) Oral daily  insulin lispro (HumaLOG) corrective regimen sliding scale  SubCutaneous Before meals and at bedtime  metoprolol 25milliGRAM(s) Oral two times a day  amLODIPine   Tablet 10milliGRAM(s) Oral daily  heparin  Injectable 5000Unit(s) SubCutaneous every 12 hours  metroNIDAZOLE    Tablet 500milliGRAM(s) Oral every 8 hours    MEDICATIONS  (PRN):  dextrose Gel 1Dose(s) Oral once PRN Blood Glucose LESS THAN 70 milliGRAM(s)/deciliter  acetaminophen   Tablet 650milliGRAM(s) Oral every 6 hours PRN For Temp greater than 38 C (100.4 F)      REVIEW OF SYSTEMS:  oob   wants to go home   several family members by bedside   no issues     VITAL SIGNS:  T(C): 36.7, Max: 37.1 (04-08 @ 05:02)  T(F): 98, Max: 98.8 (04-08 @ 05:02)  HR: 70 (61 - 74)  BP: 117/34 (117/34 - 171/58)  RR: 18 (16 - 18)  SpO2: 98% (97% - 99%)  Wt(kg): --    PHYSICAL EXAM:    GENERAL: not in any distress  HEENT: Neck is supple, normocephalic, atraumatic   CHEST/LUNG: Clear to percussion bilaterally; No rales, rhonchi, wheezing  HEART: Regular rate and rhythm; No murmurs, rubs, or gallops  ABDOMEN: Soft, Nontender, Nondistended; Bowel sounds present, no rebound   EXTREMITIES:  2+ Peripheral Pulses, No clubbing, cyanosis, or edema  SKIN: No rashes or lesions  BACK: no pressor sore   NERVOUS SYSTEM:  Alert & Oriented X2,   LABS:                         9.6    8.5   )-----------( 287      ( 08 Apr 2017 07:23 )             30.0     04-08    140  |  103  |  28<H>  ----------------------------<  104<H>  3.7   |  27  |  5.53<H>    Ca    8.7      08 Apr 2017 07:23              CARDIAC MARKERS ( 06 Apr 2017 19:59 )  1.570 ng/mL / x     / x     / x     / x                  Vancomycin Level, Trough: 16.5 ug/mL (04-06 @ 06:20)      C Diff by PCR Result: NotDetec (04-07 @ 12:39)    Culture Results:   No enteric pathogens to date: Final culture pending  No enteric gram negative rods isolated (04-07 @ 12:21)  Culture Results:   Testing in progress (04-07 @ 12:08)  Culture Results:   No growth (04-03 @ 08:24)  Culture Results:   No growth at 5 days. (04-02 @ 10:21)  Culture Results:   No growth at 5 days. (04-02 @ 10:21)  Culture Results:   No growth (04-02 @ 10:15)                Radiology:

## 2017-04-08 NOTE — PROGRESS NOTE ADULT - PROBLEM SELECTOR PLAN 1
Patient with one soft/loose BM today. 1) Add Cholestyramine  2) f/u albumin level  3) continue PO flagyl.

## 2017-04-09 LAB
ALBUMIN SERPL ELPH-MCNC: 2.4 G/DL — LOW (ref 3.3–5)
ALP SERPL-CCNC: 63 U/L — SIGNIFICANT CHANGE UP (ref 40–120)
ALT FLD-CCNC: 40 U/L — SIGNIFICANT CHANGE UP (ref 12–78)
AST SERPL-CCNC: 32 U/L — SIGNIFICANT CHANGE UP (ref 15–37)
BILIRUB DIRECT SERPL-MCNC: 0.12 MG/DL — SIGNIFICANT CHANGE UP (ref 0.05–0.2)
BILIRUB INDIRECT FLD-MCNC: 0.3 MG/DL — SIGNIFICANT CHANGE UP (ref 0.2–1)
BILIRUB SERPL-MCNC: 0.4 MG/DL — SIGNIFICANT CHANGE UP (ref 0.2–1.2)
CULTURE RESULTS: SIGNIFICANT CHANGE UP
PROT SERPL-MCNC: 6.5 GM/DL — SIGNIFICANT CHANGE UP (ref 6–8.3)
SPECIMEN SOURCE: SIGNIFICANT CHANGE UP

## 2017-04-09 PROCEDURE — 99232 SBSQ HOSP IP/OBS MODERATE 35: CPT

## 2017-04-09 RX ORDER — REGADENOSON 0.08 MG/ML
0.4 INJECTION, SOLUTION INTRAVENOUS ONCE
Qty: 0 | Refills: 0 | Status: DISCONTINUED | OUTPATIENT
Start: 2017-04-09 | End: 2017-04-10

## 2017-04-09 RX ORDER — INSULIN GLARGINE 100 [IU]/ML
4 INJECTION, SOLUTION SUBCUTANEOUS AT BEDTIME
Qty: 0 | Refills: 0 | Status: DISCONTINUED | OUTPATIENT
Start: 2017-04-09 | End: 2017-04-11

## 2017-04-09 RX ADMIN — AMLODIPINE BESYLATE 10 MILLIGRAM(S): 2.5 TABLET ORAL at 06:25

## 2017-04-09 RX ADMIN — Medication 500 MILLIGRAM(S): at 22:47

## 2017-04-09 RX ADMIN — INSULIN GLARGINE 4 UNIT(S): 100 INJECTION, SOLUTION SUBCUTANEOUS at 22:49

## 2017-04-09 RX ADMIN — Medication 500 MILLIGRAM(S): at 06:25

## 2017-04-09 RX ADMIN — Medication 2: at 17:32

## 2017-04-09 RX ADMIN — Medication 500 MILLIGRAM(S): at 13:21

## 2017-04-09 RX ADMIN — CHOLESTYRAMINE 4 GRAM(S): 4 POWDER, FOR SUSPENSION ORAL at 12:39

## 2017-04-09 RX ADMIN — HEPARIN SODIUM 5000 UNIT(S): 5000 INJECTION INTRAVENOUS; SUBCUTANEOUS at 17:33

## 2017-04-09 RX ADMIN — CLOPIDOGREL BISULFATE 75 MILLIGRAM(S): 75 TABLET, FILM COATED ORAL at 12:39

## 2017-04-09 RX ADMIN — ATORVASTATIN CALCIUM 40 MILLIGRAM(S): 80 TABLET, FILM COATED ORAL at 22:47

## 2017-04-09 RX ADMIN — Medication 25 MILLIGRAM(S): at 06:25

## 2017-04-09 RX ADMIN — CHOLESTYRAMINE 4 GRAM(S): 4 POWDER, FOR SUSPENSION ORAL at 21:57

## 2017-04-09 RX ADMIN — Medication 25 MILLIGRAM(S): at 17:32

## 2017-04-09 RX ADMIN — Medication 81 MILLIGRAM(S): at 12:39

## 2017-04-09 RX ADMIN — HEPARIN SODIUM 5000 UNIT(S): 5000 INJECTION INTRAVENOUS; SUBCUTANEOUS at 06:24

## 2017-04-09 NOTE — PROGRESS NOTE ADULT - SUBJECTIVE AND OBJECTIVE BOX
Patient is a 86y old  Female who presents with a chief complaint of Daughter stated that patient was unresponsive at home. (02 Apr 2017 15:53)      HPI:  Pt. is a 85 y/o female w/pmhx of HTN, DM-2 (on lantus 16u qhs), Seizure d/o (not taking keppra), ESRD on HD m,w,f was in [facility] till friday while in HD pt pulled catheter and later was told there was fistula clot, went to vascular yesterday andafte had full HD, son states after HD pt felt weak. states she was just not herself and brings her in, no seizure like activity noted, no cp, palitations n/v/d/c no fever or chills. 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 1000milliLiter(s) IV Continuous <Continuous>  dextrose 50% Injectable 12.5Gram(s) IV Push once  dextrose 50% Injectable 25Gram(s) IV Push once  dextrose 50% Injectable 25Gram(s) IV Push once  aspirin  chewable 81milliGRAM(s) Oral daily  insulin lispro (HumaLOG) corrective regimen sliding scale  SubCutaneous Before meals and at bedtime  metoprolol 25milliGRAM(s) Oral two times a day  amLODIPine   Tablet 10milliGRAM(s) Oral daily  heparin  Injectable 5000Unit(s) SubCutaneous every 12 hours  metroNIDAZOLE    Tablet 500milliGRAM(s) Oral every 8 hours  cholestyramine Powder (Sugar-Free) 4Gram(s) Oral two times a day  insulin glargine Injectable (LANTUS) 4Unit(s) SubCutaneous at bedtime    MEDICATIONS  (PRN):  dextrose Gel 1Dose(s) Oral once PRN Blood Glucose LESS THAN 70 milliGRAM(s)/deciliter  acetaminophen   Tablet 650milliGRAM(s) Oral every 6 hours PRN For Temp greater than 38 C (100.4 F)  regadenoson Injectable 0.4milliGRAM(s) IV Push once PRN to be administered during stress test      FAMILY HISTORY:  No pertinent family history in first degree relatives      Allergies    No Known Allergies    Intolerances        PMH/PSH:  LBBB (left bundle branch block)  Seizure  H/O: Hypothyroidism  Hypertension  Acute Renal Insufficiency  CHF (Congestive Heart Failure)  Diabetes Mellitus  Dyslipidemia  S/P cardiac cath  Diabetes  FH: HTN (hypertension)  Acute CHF  No Past Surgical History        REVIEW OF SYSTEMS:  CONSTITUTIONAL: No fever, weight loss, or fatigue  EYES: No eye pain, visual disturbances, or discharge  ENMT:  No difficulty hearing, tinnitus, vertigo; No sinus or throat pain  NECK: No pain or stiffness  BREASTS: No pain, masses, or nipple discharge  RESPIRATORY: No cough, wheezing, chills or hemoptysis; No shortness of breath  CARDIOVASCULAR: No chest pain, palpitations, dizziness, or leg swelling  GASTROINTESTINAL: No abdominal or epigastric pain. No nausea, vomiting, or hematemesis; No diarrhea or constipation. No melena or hematochezia.  GENITOURINARY: No dysuria, frequency, hematuria, or incontinence  NEUROLOGICAL: No headaches, memory loss, loss of strength, numbness, or tremors  SKIN: No itching, burning, rashes, or lesions       Vital Signs Last 24 Hrs  T(C): 36.6, Max: 36.9 (04-09 @ 12:05)  T(F): 97.8, Max: 98.4 (04-09 @ 12:05)  HR: 57 (57 - 68)  BP: 137/67 (117/40 - 155/52)  BP(mean): --  RR: 17 (16 - 17)  SpO2: 100% (95% - 100%)    PHYSICAL EXAM:  GENERAL: NAD, well-groomed, well-developed  HEAD:  Atraumatic, Normocephalic  EYES: EOMI, PERRLA, conjunctiva and sclera clear  NECK: Supple, No JVD, Normal thyroid  NERVOUS SYSTEM:  Alert & Oriented X3, Good concentration; Motor Strength 5/5 B/L upper and lower extremities; CHEST/LUNG: Clear to percussion bilaterally; No rales, rhonchi, wheezing, or rubs  HEART: Regular rate and rhythm; No murmurs, rubs, or gallops  ABDOMEN: Soft, Nontender, Nondistended; Bowel sounds present  EXTREMITIES:  2+ Peripheral Pulses, No clubbing, cyanosis, or edema  LYMPH: No lymphadenopathy noted  SKIN: No rashes or lesions    LAB    CBC  WBC Count: 8.5 K/uL (04-08 @ 07:23)  Hemoglobin: 9.6 g/dL (04-08 @ 07:23)  Hematocrit: 30.0 % (04-08 @ 07:23)  Platelet Count - Automated: 287 K/uL (04-08 @ 07:23)  WBC Count: 7.9 K/uL (04-07 @ 08:06)  Hemoglobin: 9.1 g/dL (04-07 @ 08:06)  Hematocrit: 30.0 % (04-07 @ 08:06)      08 Apr 2017 07:23    140    |  103    |  28     ----------------------------<  104    3.7     |  27     |  5.53   07 Apr 2017 08:06    143    |  104    |  18     ----------------------------<  106    3.8     |  29     |  4.22   06 Apr 2017 06:20    146    |  107    |  26     ----------------------------<  92     3.5     |  27     |  5.19   05 Apr 2017 03:44    145    |  105    |  15     ----------------------------<  122    3.3     |  29     |  3.29   04 Apr 2017 03:30    142    |  102    |  45     ----------------------------<  99     4.4     |  23     |  6.23   03 Apr 2017 08:24    139    |  102    |  38     ----------------------------<  134    4.9     |  26     |  5.22   02 Apr 2017 23:05    138    |  102    |  33     ----------------------------<  161    3.8     |  19     |  4.82     Ca    8.7        08 Apr 2017 07:23  Ca    8.3        07 Apr 2017 08:06  Ca    8.3        06 Apr 2017 06:20  Ca    8.0        05 Apr 2017 03:44  Ca    7.9        04 Apr 2017 03:30  Ca    8.9        03 Apr 2017 08:24  Ca    8.9        02 Apr 2017 23:05  Phos  3.4       06 Apr 2017 06:20  Phos  2.6       05 Apr 2017 03:44  Phos  4.9       04 Apr 2017 03:30  Phos  4.4       03 Apr 2017 03:54  Mg     2.5       06 Apr 2017 06:20  Mg     2.4       05 Apr 2017 03:44  Mg     2.7       04 Apr 2017 03:30  Mg     2.7       02 Apr 2017 23:05    TPro  6.5    /  Alb  2.4    /  TBili  0.4    /  DBili  .12    /  AST  32     /  ALT  40     /  AlkPhos  63     09 Apr 2017 06:14  TPro  5.9    /  Alb  2.1    /  TBili  0.3    /  DBili  x      /  AST  242    /  ALT  117    /  AlkPhos  62     04 Apr 2017 03:30  TPro  7.1    /  Alb  2.6    /  TBili  0.5    /  DBili  x      /  AST  30     /  ALT  11     /  AlkPhos  70     02 Apr 2017 23:05        CAPILLARY BLOOD GLUCOSE  215 (09 Apr 2017 16:26)  118 (09 Apr 2017 07:28)  139 (08 Apr 2017 21:52)        RADIOLOGY & ADDITIONAL TESTS:    Imaging Personally Reviewed:  [ ] YES  [ ] NO    Consultant(s) Notes Reviewed:  [ ] YES  [ ] NO    Care Discussed with Consultants/Other Providers [ ] YES  [ ] NO

## 2017-04-09 NOTE — PROGRESS NOTE ADULT - SUBJECTIVE AND OBJECTIVE BOX
Assessment:    NSTEMI in setting of ESRD and sepsis, with a history of CAD and 2 stents in the past, DM  Medical management ongoing, ASA, Plavix,  Statin if not contraindicated  IV ABX  Echocardiogram reviewed  EKG reviewed and compared with previous from last hospital admission here, no acute changes noted.   CE trending down  Stress test  Monday  H/H noted

## 2017-04-09 NOTE — PROGRESS NOTE ADULT - ASSESSMENT
Pt. is a 87 y/o female w/pmhx of HTN, DM-2 (on lantus 16u qhs), Seizure d/o (not taking keppra), ESRD on HD m,w,f was in [facility] till friday while in HD pt pulled catheter and later was told there was fistula clot, went to vascular yesterday andafte had full HD, son states after HD pt felt weak. states she was just not herself and brings her in, no seizure like activity noted, no cp, palitations n/v/d/c no fever or chills. (02 Apr 2017 05:01)  Chart reviewed. Patient admitted with septic shock and NSTEMI  Consult callled for diarrhea. Patient was in ICU with flexiceal.  The average out put was 300 ml. Patient was on antibiotics. C diff, cx - negative.  Today, flexiceal was removed  @ 11:30 AND HAS NOT HAD A BOWEL MOVEMENT SINCE. ( 3 PM ). Patient now only on IV Flagyl ) Stool studies pending. Patient is eating solid foods. The patient denies any history of melena, hematochezia, hematemesis, nausea, vomiting, abdominal pain, constipation, diarrhea, or change in bowel movements.  She has never had a colonoscopy.  Diarrhea - 1) check stool studies 2) stool count 3) will hold off on any procedures at present time 4) maintain same diet.  Will follow.

## 2017-04-09 NOTE — PROGRESS NOTE ADULT - SUBJECTIVE AND OBJECTIVE BOX
CC/F/U for:    INTERVAL HPI/OVERNIGHT EVENTS:  Pt seen and examined at bedside.     Allergies/Intolerance: No Known Allergies      MEDICATIONS  (STANDING):  atorvastatin 40milliGRAM(s) Oral at bedtime  clopidogrel Tablet 75milliGRAM(s) Oral daily  insulin glargine Injectable (LANTUS) 6Unit(s) SubCutaneous at bedtime  dextrose 5%. 1000milliLiter(s) IV Continuous <Continuous>  dextrose 50% Injectable 12.5Gram(s) IV Push once  dextrose 50% Injectable 25Gram(s) IV Push once  dextrose 50% Injectable 25Gram(s) IV Push once  aspirin  chewable 81milliGRAM(s) Oral daily  insulin lispro (HumaLOG) corrective regimen sliding scale  SubCutaneous Before meals and at bedtime  metoprolol 25milliGRAM(s) Oral two times a day  amLODIPine   Tablet 10milliGRAM(s) Oral daily  heparin  Injectable 5000Unit(s) SubCutaneous every 12 hours  metroNIDAZOLE    Tablet 500milliGRAM(s) Oral every 8 hours  cholestyramine Powder (Sugar-Free) 4Gram(s) Oral two times a day    MEDICATIONS  (PRN):  dextrose Gel 1Dose(s) Oral once PRN Blood Glucose LESS THAN 70 milliGRAM(s)/deciliter  acetaminophen   Tablet 650milliGRAM(s) Oral every 6 hours PRN For Temp greater than 38 C (100.4 F)        ROS: all systems reviewed and wnl      PHYSICAL EXAMINATION:  Vital Signs Last 24 Hrs  T(C): 36.5, Max: 36.8 (04-08 @ 13:15)  T(F): 97.7, Max: 98.2 (04-08 @ 13:15)  HR: 68 (65 - 74)  BP: 155/52 (117/34 - 155/52)  BP(mean): --  RR: 16 (16 - 18)  SpO2: 98% (95% - 100%)  CAPILLARY BLOOD GLUCOSE  118 (09 Apr 2017 07:28)  139 (08 Apr 2017 21:52)  254 (08 Apr 2017 16:17)  94 (08 Apr 2017 13:33)      GENERAL: NAD, well-groomed, well-developed  HEAD:  atraumatic, normocephalic  EYES: sclera anicteric  ENMT: mucous membranes moist  NECK: supple, No JVD  CHEST/LUNG: clear to auscultation bilaterally; no rales, rhonchi, or wheezing b/l  HEART: normal S1, S2  ABDOMEN: BS+, soft, ND, NT   EXTREMITIES:  pulses palpable; no clubbing, cyanosis, or edema b/l LEs  NEURO: awake, alert, interactive; moves all extremities  SKIN: no rashes or lesions      LABS:                        9.6    8.5   )-----------( 287      ( 08 Apr 2017 07:23 )             30.0     04-08    140  |  103  |  28<H>  ----------------------------<  104<H>  3.7   |  27  |  5.53<H>    Ca    8.7      08 Apr 2017 07:23    TPro  6.5  /  Alb  2.4<L>  /  TBili  0.4  /  DBili  .12  /  AST  32  /  ALT  40  /  AlkPhos  63  04-09            RADIOLOGY & ADDITIONAL TESTS:      ASSESSMENT AND PLAN: CC/F/U for: SOB, acute pulmonary edema and ESRD on HD. Stable in bed, NAD.Comfortable. No blood in stools.     INTERVAL HPI/OVERNIGHT EVENTS:  Pt seen and examined at bedside.     Allergies/Intolerance: No Known Allergies      MEDICATIONS  (STANDING):  atorvastatin 40milliGRAM(s) Oral at bedtime  clopidogrel Tablet 75milliGRAM(s) Oral daily  insulin glargine Injectable (LANTUS) 6Unit(s) SubCutaneous at bedtime  dextrose 5%. 1000milliLiter(s) IV Continuous <Continuous>  dextrose 50% Injectable 12.5Gram(s) IV Push once  dextrose 50% Injectable 25Gram(s) IV Push once  dextrose 50% Injectable 25Gram(s) IV Push once  aspirin  chewable 81milliGRAM(s) Oral daily  insulin lispro (HumaLOG) corrective regimen sliding scale  SubCutaneous Before meals and at bedtime  metoprolol 25milliGRAM(s) Oral two times a day  amLODIPine   Tablet 10milliGRAM(s) Oral daily  heparin  Injectable 5000Unit(s) SubCutaneous every 12 hours  metroNIDAZOLE    Tablet 500milliGRAM(s) Oral every 8 hours  cholestyramine Powder (Sugar-Free) 4Gram(s) Oral two times a day    MEDICATIONS  (PRN):  dextrose Gel 1Dose(s) Oral once PRN Blood Glucose LESS THAN 70 milliGRAM(s)/deciliter  acetaminophen   Tablet 650milliGRAM(s) Oral every 6 hours PRN For Temp greater than 38 C (100.4 F)        ROS: all systems reviewed and wnl      PHYSICAL EXAMINATION:  Vital Signs Last 24 Hrs  T(C): 36.5, Max: 36.8 (04-08 @ 13:15)  T(F): 97.7, Max: 98.2 (04-08 @ 13:15)  HR: 68 (65 - 74)  BP: 155/52 (117/34 - 155/52)  BP(mean): --  RR: 16 (16 - 18)  SpO2: 98% (95% - 100%)  CAPILLARY BLOOD GLUCOSE  118 (09 Apr 2017 07:28)  139 (08 Apr 2017 21:52)  254 (08 Apr 2017 16:17)  94 (08 Apr 2017 13:33)      GENERAL: NAD, well-groomed, well-developed  HEAD:  atraumatic, normocephalic  EYES: sclera anicteric  ENMT: mucous membranes moist  NECK: supple, No JVD  CHEST/LUNG: clear to auscultation bilaterally; no rales, rhonchi, or wheezing b/l  HEART: normal S1, S2  ABDOMEN: BS+, soft, ND, NT   EXTREMITIES:  pulses palpable; no clubbing, cyanosis, or edema b/l LEs  NEURO: awake, alert, interactive; moves all extremities  SKIN: no rashes or lesions      LABS:                        9.6    8.5   )-----------( 287      ( 08 Apr 2017 07:23 )             30.0     04-08    140  |  103  |  28<H>  ----------------------------<  104<H>  3.7   |  27  |  5.53<H>    Ca    8.7      08 Apr 2017 07:23    TPro  6.5  /  Alb  2.4<L>  /  TBili  0.4  /  DBili  .12  /  AST  32  /  ALT  40  /  AlkPhos  63  04-09            RADIOLOGY & ADDITIONAL TESTS: none      ASSESSMENT AND PLAN: CC/F/U for: SOB, acute pulmonary edema and ESRD on HD. Stable in bed, NAD.Comfortable. No blood in stools.     INTERVAL HPI/OVERNIGHT EVENTS:  Pt seen and examined at bedside.     Allergies/Intolerance: No Known Allergies      MEDICATIONS  (STANDING):  atorvastatin 40milliGRAM(s) Oral at bedtime  clopidogrel Tablet 75milliGRAM(s) Oral daily  insulin glargine Injectable (LANTUS) 6Unit(s) SubCutaneous at bedtime  dextrose 5%. 1000milliLiter(s) IV Continuous <Continuous>  dextrose 50% Injectable 12.5Gram(s) IV Push once  dextrose 50% Injectable 25Gram(s) IV Push once  dextrose 50% Injectable 25Gram(s) IV Push once  aspirin  chewable 81milliGRAM(s) Oral daily  insulin lispro (HumaLOG) corrective regimen sliding scale  SubCutaneous Before meals and at bedtime  metoprolol 25milliGRAM(s) Oral two times a day  amLODIPine   Tablet 10milliGRAM(s) Oral daily  heparin  Injectable 5000Unit(s) SubCutaneous every 12 hours  metroNIDAZOLE    Tablet 500milliGRAM(s) Oral every 8 hours  cholestyramine Powder (Sugar-Free) 4Gram(s) Oral two times a day    MEDICATIONS  (PRN):  dextrose Gel 1Dose(s) Oral once PRN Blood Glucose LESS THAN 70 milliGRAM(s)/deciliter  acetaminophen   Tablet 650milliGRAM(s) Oral every 6 hours PRN For Temp greater than 38 C (100.4 F)        ROS: all systems reviewed and wnl      PHYSICAL EXAMINATION:  Vital Signs Last 24 Hrs  T(C): 36.5, Max: 36.8 (04-08 @ 13:15)  T(F): 97.7, Max: 98.2 (04-08 @ 13:15)  HR: 68 (65 - 74)  BP: 155/52 (117/34 - 155/52)  BP(mean): --  RR: 16 (16 - 18)  SpO2: 98% (95% - 100%)  CAPILLARY BLOOD GLUCOSE  118 (09 Apr 2017 07:28)  139 (08 Apr 2017 21:52)  254 (08 Apr 2017 16:17)  94 (08 Apr 2017 13:33)      GENERAL: NAD, well-groomed, well-developed  HEAD:  atraumatic, normocephalic  EYES: sclera anicteric  ENMT: mucous membranes moist  NECK: supple, No JVD  CHEST/LUNG: clear to auscultation bilaterally; no rales, rhonchi, or wheezing b/l  HEART: normal S1, S2  ABDOMEN: BS+, soft, ND, NT   EXTREMITIES:  pulses palpable; no clubbing, cyanosis, or edema b/l LEs  NEURO: awake, alert, interactive; moves all extremities  SKIN: no rashes or lesions      LABS:                        9.6    8.5   )-----------( 287      ( 08 Apr 2017 07:23 )             30.0     04-08    140  |  103  |  28<H>  ----------------------------<  104<H>  3.7   |  27  |  5.53<H>    Ca    8.7      08 Apr 2017 07:23    TPro  6.5  /  Alb  2.4<L>  /  TBili  0.4  /  DBili  .12  /  AST  32  /  ALT  40  /  AlkPhos  63  04-09            RADIOLOGY & ADDITIONAL TESTS: none         ASSESSMENT AND PLAN : 86 year old female s/p ICU stay with intubation now extubation and on 2-E. Continue Norvasc 10 mg/day for BP, HD per renal for fluid balance.     Flagyl for diarrhea, change to oral Flagyl yesterday. GI added cholystyramine. Continue Lipitor 40 mg /day with ASA and Plavix for ASHD. Lantus 6 units/day for DM, renally dosed, works well now.      May need cardiac stress on Monday. Troponins trending down to 1.57. TTE showed mild decrease in LV function but no change to prior TTE reports. Continue overall supportive care. Possible chemical stress test monday. Await cardiology follow-up. CC/F/U for: SOB, acute pulmonary edema and ESRD on HD. Stable in bed, NAD.Comfortable. No blood in stools.     INTERVAL HPI/OVERNIGHT EVENTS:  Pt seen and examined at bedside.     Allergies/Intolerance: No Known Allergies      MEDICATIONS  (STANDING):  atorvastatin 40milliGRAM(s) Oral at bedtime  clopidogrel Tablet 75milliGRAM(s) Oral daily  insulin glargine Injectable (LANTUS) 6Unit(s) SubCutaneous at bedtime  dextrose 5%. 1000milliLiter(s) IV Continuous <Continuous>  dextrose 50% Injectable 12.5Gram(s) IV Push once  dextrose 50% Injectable 25Gram(s) IV Push once  dextrose 50% Injectable 25Gram(s) IV Push once  aspirin  chewable 81milliGRAM(s) Oral daily  insulin lispro (HumaLOG) corrective regimen sliding scale  SubCutaneous Before meals and at bedtime  metoprolol 25milliGRAM(s) Oral two times a day  amLODIPine   Tablet 10milliGRAM(s) Oral daily  heparin  Injectable 5000Unit(s) SubCutaneous every 12 hours  metroNIDAZOLE    Tablet 500milliGRAM(s) Oral every 8 hours  cholestyramine Powder (Sugar-Free) 4Gram(s) Oral two times a day    MEDICATIONS  (PRN):  dextrose Gel 1Dose(s) Oral once PRN Blood Glucose LESS THAN 70 milliGRAM(s)/deciliter  acetaminophen   Tablet 650milliGRAM(s) Oral every 6 hours PRN For Temp greater than 38 C (100.4 F)        ROS: all systems reviewed and wnl      PHYSICAL EXAMINATION:  Vital Signs Last 24 Hrs  T(C): 36.5, Max: 36.8 (04-08 @ 13:15)  T(F): 97.7, Max: 98.2 (04-08 @ 13:15)  HR: 68 (65 - 74)  BP: 155/52 (117/34 - 155/52)  BP(mean): --  RR: 16 (16 - 18)  SpO2: 98% (95% - 100%)  CAPILLARY BLOOD GLUCOSE  118 (09 Apr 2017 07:28)  139 (08 Apr 2017 21:52)  254 (08 Apr 2017 16:17)  94 (08 Apr 2017 13:33)      GENERAL: NAD, well-groomed, well-developed  HEAD:  atraumatic, normocephalic  EYES: sclera anicteric  ENMT: mucous membranes moist  NECK: supple, No JVD  CHEST/LUNG: clear to auscultation bilaterally; no rales, rhonchi, or wheezing b/l  HEART: normal S1, S2  ABDOMEN: BS+, soft, ND, NT   EXTREMITIES:  pulses palpable; no clubbing, cyanosis, or edema b/l LEs  NEURO: awake, alert, interactive; moves all extremities  SKIN: no rashes or lesions      LABS:                        9.6    8.5   )-----------( 287      ( 08 Apr 2017 07:23 )             30.0     04-08    140  |  103  |  28<H>  ----------------------------<  104<H>  3.7   |  27  |  5.53<H>    Ca    8.7      08 Apr 2017 07:23    TPro  6.5  /  Alb  2.4<L>  /  TBili  0.4  /  DBili  .12  /  AST  32  /  ALT  40  /  AlkPhos  63  04-09            RADIOLOGY & ADDITIONAL TESTS: none         ASSESSMENT AND PLAN : 86 year old female s/p ICU stay with intubation now extubation and on 2-E. Continue Norvasc 10 mg/day for BP, HD per renal for fluid balance.     Flagyl for diarrhea, change to oral Flagyl yesterday. GI added cholystyramine. Continue Lipitor 40 mg /day with ASA and Plavix for ASHD. Decrease Lantus to 4 units/day for DM, renally dosed, works well now.      May need cardiac stress on Monday. Troponins trending down to 1.57. TTE showed mild decrease in LV function but no change to prior TTE reports. Continue overall supportive care. Possible chemical stress test monday. Await cardiology follow-up. CC/F/U for: SOB, acute pulmonary edema and ESRD on HD. Stable in bed, NAD.Comfortable. No blood in stools. Eating lunch.     INTERVAL HPI/OVERNIGHT EVENTS:  Pt seen and examined at bedside.     Allergies/Intolerance: No Known Allergies      MEDICATIONS  (STANDING):  atorvastatin 40milliGRAM(s) Oral at bedtime  clopidogrel Tablet 75milliGRAM(s) Oral daily  insulin glargine Injectable (LANTUS) 6Unit(s) SubCutaneous at bedtime  dextrose 5%. 1000milliLiter(s) IV Continuous <Continuous>  dextrose 50% Injectable 12.5Gram(s) IV Push once  dextrose 50% Injectable 25Gram(s) IV Push once  dextrose 50% Injectable 25Gram(s) IV Push once  aspirin  chewable 81milliGRAM(s) Oral daily  insulin lispro (HumaLOG) corrective regimen sliding scale  SubCutaneous Before meals and at bedtime  metoprolol 25milliGRAM(s) Oral two times a day  amLODIPine   Tablet 10milliGRAM(s) Oral daily  heparin  Injectable 5000Unit(s) SubCutaneous every 12 hours  metroNIDAZOLE    Tablet 500milliGRAM(s) Oral every 8 hours  cholestyramine Powder (Sugar-Free) 4Gram(s) Oral two times a day    MEDICATIONS  (PRN):  dextrose Gel 1Dose(s) Oral once PRN Blood Glucose LESS THAN 70 milliGRAM(s)/deciliter  acetaminophen   Tablet 650milliGRAM(s) Oral every 6 hours PRN For Temp greater than 38 C (100.4 F)        ROS: all systems reviewed and wnl      PHYSICAL EXAMINATION:  Vital Signs Last 24 Hrs  T(C): 36.5, Max: 36.8 (04-08 @ 13:15)  T(F): 97.7, Max: 98.2 (04-08 @ 13:15)  HR: 68 (65 - 74)  BP: 155/52 (117/34 - 155/52)  BP(mean): --  RR: 16 (16 - 18)  SpO2: 98% (95% - 100%)  CAPILLARY BLOOD GLUCOSE  118 (09 Apr 2017 07:28)  139 (08 Apr 2017 21:52)  254 (08 Apr 2017 16:17)  94 (08 Apr 2017 13:33)      GENERAL: NAD, well-groomed, well-developed  HEAD:  atraumatic, normocephalic  EYES: sclera anicteric  ENMT: mucous membranes moist  NECK: supple, No JVD  CHEST/LUNG: clear to auscultation bilaterally; no rales, rhonchi, or wheezing b/l  HEART: normal S1, S2  ABDOMEN: BS+, soft, ND, NT   EXTREMITIES:  pulses palpable; no clubbing, cyanosis, or edema b/l LEs  NEURO: awake, alert, interactive; moves all extremities  SKIN: no rashes or lesions      LABS:                        9.6    8.5   )-----------( 287      ( 08 Apr 2017 07:23 )             30.0     04-08    140  |  103  |  28<H>  ----------------------------<  104<H>  3.7   |  27  |  5.53<H>    Ca    8.7      08 Apr 2017 07:23    TPro  6.5  /  Alb  2.4<L>  /  TBili  0.4  /  DBili  .12  /  AST  32  /  ALT  40  /  AlkPhos  63  04-09            RADIOLOGY & ADDITIONAL TESTS: none         ASSESSMENT AND PLAN : 86 year old female s/p ICU stay with intubation now extubation and on 2-E. Continue Norvasc 10 mg/day for BP, HD per renal for fluid balance.     Flagyl for diarrhea, change to oral Flagyl yesterday. GI added cholystyramine. Continue Lipitor 40 mg /day with ASA and Plavix for ASHD. Decrease Lantus to 4 units/day for DM, renally dosed, works well now.      May need cardiac stress on Monday. Troponins trending down to 1.57. TTE showed mild decrease in LV function but no change to prior TTE reports. Continue overall supportive care. Possible chemical stress test monday then home. Await cardiology follow-up.

## 2017-04-10 LAB
HCT VFR BLD CALC: 29.4 % — LOW (ref 34.5–45)
HGB BLD-MCNC: 9.3 G/DL — LOW (ref 11.5–15.5)
MCHC RBC-ENTMCNC: 28.1 PG — SIGNIFICANT CHANGE UP (ref 27–34)
MCHC RBC-ENTMCNC: 31.8 GM/DL — LOW (ref 32–36)
MCV RBC AUTO: 88.3 FL — SIGNIFICANT CHANGE UP (ref 80–100)
PLATELET # BLD AUTO: 324 K/UL — SIGNIFICANT CHANGE UP (ref 150–400)
RBC # BLD: 3.32 M/UL — LOW (ref 3.8–5.2)
RBC # FLD: 19.4 % — HIGH (ref 11–15)
WBC # BLD: 7.9 K/UL — SIGNIFICANT CHANGE UP (ref 3.8–10.5)
WBC # FLD AUTO: 7.9 K/UL — SIGNIFICANT CHANGE UP (ref 3.8–10.5)

## 2017-04-10 PROCEDURE — 99233 SBSQ HOSP IP/OBS HIGH 50: CPT

## 2017-04-10 RX ORDER — INSULIN GLARGINE 100 [IU]/ML
5 INJECTION, SOLUTION SUBCUTANEOUS
Qty: 0 | Refills: 0 | COMMUNITY
Start: 2017-04-10

## 2017-04-10 RX ORDER — ATORVASTATIN CALCIUM 80 MG/1
1 TABLET, FILM COATED ORAL
Qty: 0 | Refills: 0 | COMMUNITY
Start: 2017-04-10

## 2017-04-10 RX ORDER — CLOPIDOGREL BISULFATE 75 MG/1
1 TABLET, FILM COATED ORAL
Qty: 0 | Refills: 0 | COMMUNITY
Start: 2017-04-10

## 2017-04-10 RX ORDER — HYDRALAZINE HCL 50 MG
1 TABLET ORAL
Qty: 60 | Refills: 0 | OUTPATIENT
Start: 2017-04-10 | End: 2017-05-10

## 2017-04-10 RX ORDER — HYDRALAZINE HCL 50 MG
50 TABLET ORAL
Qty: 0 | Refills: 0 | Status: DISCONTINUED | OUTPATIENT
Start: 2017-04-10 | End: 2017-04-11

## 2017-04-10 RX ORDER — METRONIDAZOLE 500 MG
1 TABLET ORAL
Qty: 6 | Refills: 0 | OUTPATIENT
Start: 2017-04-10 | End: 2017-04-12

## 2017-04-10 RX ADMIN — Medication 500 MILLIGRAM(S): at 14:23

## 2017-04-10 RX ADMIN — Medication 25 MILLIGRAM(S): at 05:48

## 2017-04-10 RX ADMIN — Medication 81 MILLIGRAM(S): at 13:13

## 2017-04-10 RX ADMIN — CHOLESTYRAMINE 4 GRAM(S): 4 POWDER, FOR SUSPENSION ORAL at 08:57

## 2017-04-10 RX ADMIN — CHOLESTYRAMINE 4 GRAM(S): 4 POWDER, FOR SUSPENSION ORAL at 22:06

## 2017-04-10 RX ADMIN — Medication 500 MILLIGRAM(S): at 05:48

## 2017-04-10 RX ADMIN — CLOPIDOGREL BISULFATE 75 MILLIGRAM(S): 75 TABLET, FILM COATED ORAL at 13:13

## 2017-04-10 RX ADMIN — Medication 2: at 22:07

## 2017-04-10 RX ADMIN — AMLODIPINE BESYLATE 10 MILLIGRAM(S): 2.5 TABLET ORAL at 05:48

## 2017-04-10 RX ADMIN — Medication 500 MILLIGRAM(S): at 22:07

## 2017-04-10 RX ADMIN — HEPARIN SODIUM 5000 UNIT(S): 5000 INJECTION INTRAVENOUS; SUBCUTANEOUS at 05:50

## 2017-04-10 RX ADMIN — INSULIN GLARGINE 4 UNIT(S): 100 INJECTION, SOLUTION SUBCUTANEOUS at 22:06

## 2017-04-10 RX ADMIN — ATORVASTATIN CALCIUM 40 MILLIGRAM(S): 80 TABLET, FILM COATED ORAL at 22:06

## 2017-04-10 NOTE — SWALLOW BEDSIDE ASSESSMENT ADULT - SLP PERTINENT HISTORY OF CURRENT PROBLEM
Pt is an 85 y/o female w/ pmhx of HTN, DM-2 (on lantus 16u qhs), Seizure d/o (not taking keppra), ESRD on HD m,w,f was in [facility] till friday while in HD pt pulled catheter and later was told there was fistula clot, went to vascular yesterday andafte had full HD, son states after HD pt felt weak. states she was just not herself and brings her in, no seizure like activity noted, no cp, palitations n/v/d/c no fever or chills. Pt is an 85 y/o female w/ pmhx of HTN, DM-2 (on lantus 16u qhs), Seizure d/o (not taking keppra), ESRD on HD m,w,f was in [facility] till friday while in HD pt pulled catheter and later was told there was fistula clot, went to vascular yesterday andafte had full HD, son states after HD pt felt weak. states she was just not herself and brings her in, no seizure like activity noted, no cp, palitations n/v/d/c no fever or chills. Pt was extubated on 4/4/2017 and NGTube was discontinued on 4/3/2017 86F PMH HTN, hyperlipidemia, CAD s/p stents to LAD and RCA, CHF, DM-2 (on lantus 16u qhs), CVA, Seizure d/o (not taking keppra), dementia, hx of GI bleed, hypothyroidism, ESRD on HD m,w,f presents to hospital via ambulance for change in mental status.  Here in ER found to have leukocytosis WBC 17.8. Rapid response on medical floor for respiratory distress, pulmonary edema. Troponin elevated sec to NSTEMI.  Transferred to ICU, failed BiPAP. Intubated. Pt was extubated on 4/4/2017 and NGTube was discontinued on 4/3/2017

## 2017-04-10 NOTE — PROGRESS NOTE ADULT - ASSESSMENT
85 yo F with multiple medical problems, ESRD on HD ,  seizure disorder, CAD (s/p stent about 10 years ago), systolic heart failure, DM (type 2 - on lantus), hypothryoid, initially presented with weakness/lethargy/AMS found to had acute hypoxic respiratory failure s/p intubation, acute on chronic systolic heart failure,NSTEMI, hypotension requiring pressors secondary to cardiogenic shock vs septic shock , being treated empirically for presumed gram negative pneumonia. Pt. off pressors, extubated transferred to tele floor, recurrent watery diarrhea resolved

## 2017-04-10 NOTE — PROGRESS NOTE ADULT - SUBJECTIVE AND OBJECTIVE BOX
Patient for HD today. No new events.  Tolerating HD treatments well.  HD prescription reviewed.        PHYSICAL EXAM:      T(C): 36.9, Max: 36.9 (04-10 @ 11:15)  HR: 60 (57 - 80)  BP: 145/58 (137/67 - 160/67)  RR: 16 (16 - 17)  SpO2: 100% (98% - 100%)  Wt(kg): --  Respiratory: clear anteriorly, decreased BS at bases  Cardiovascular: S1 S2  Gastrointestinal: soft NT ND +BS  Extremities:  tr edema LUE AVF                          TPro  6.5  /  Alb  2.4<L>  /  TBili  0.4  /  DBili  .12  /  AST  32  /  ALT  40  /  AlkPhos  63  04-09      Maintenance hemodialysis.  Blood pressure trends, BFR, AP, , UF goal reviewed.  Cardio follow up noted.  Clinically stable.

## 2017-04-10 NOTE — SWALLOW BEDSIDE ASSESSMENT ADULT - SWALLOW EVAL: DIAGNOSIS
Pt is an 86 y o female admitted with weakness, elevated troponin, and leukocytosis presented with reduced po acceptance therefore limiting eval however noted oropharyngeal phases of the swallow grossly WNL. No overt signs of aspiration. Pt is an 86 y o female admitted with weakness, elevated troponin, leukocytosis, and CHF presented with reduced po acceptance therefore limiting eval however noted oropharyngeal phases of the swallow grossly WNL. No overt signs of aspiration.

## 2017-04-10 NOTE — DISCHARGE NOTE ADULT - SECONDARY DIAGNOSIS.
Acute on chronic combined systolic and diastolic congestive heart failure Type 2 diabetes mellitus with hyperglycemia, with long-term current use of insulin Diarrhea of presumed infectious origin

## 2017-04-10 NOTE — SWALLOW BEDSIDE ASSESSMENT ADULT - SWALLOW EVAL: RECOMMENDED FEEDING/EATING TECHNIQUES
allow for swallow between intakes/small sips/bites/maintain upright posture during/after eating for 30 mins

## 2017-04-10 NOTE — SWALLOW BEDSIDE ASSESSMENT ADULT - COMMENTS
CXR 4/7/2017 Impression: There are faint residual patchy opacities at the right lung base. No significant change from 4/5/2017    CT head 4/2/2017 Impression: No acute intracranial bleeding, mass effect, or shift. Extensive white matter disease and multiple chronic infarctions.

## 2017-04-10 NOTE — PROGRESS NOTE ADULT - ASSESSMENT
85 yo F with multiple medical problems, ESRD on HD ,  seizure disorder, CAD (s/p stent about 10 years ago), systolic heart failure, DM (type 2 - on lantus), hypothryoid, initially presented with weakness/lethargy/AMS found to had acute hypoxic respiratory failure s/p intubation, acute on chronic systolic heart failure,NSTEMI, hypotension requiring pressors secondary to cardiogenic shock vs septic shock , being treated empirically for presumed gram negative pneumonia. Pt. off pressors, extubated transferred to tele floor,   recurrent watery diarrhea today ; flagyl started by primary care physician noted   off antibiotics   on po flagyl and stable    no fevers and no leucocytosis  discharge plan per PMD

## 2017-04-10 NOTE — PROGRESS NOTE ADULT - SUBJECTIVE AND OBJECTIVE BOX
infectious diseases progress note:    Pt. is a 85 y/o female w/pmhx of HTN, DM-2 (on lantus 16u qhs), Seizure d/o (not taking keppra), ESRD on HD m,w,f was in [facility] till friday while in HD pt pulled catheter and later was told there was fistula clot, went to vascular yesterday andafte had full HD, son states after HD pt felt weak. states she was just not herself and brings her in, no seizure like activity noted, no cp, palitations n/v/d/c no fever or chills. (02 Apr 2017 05:01)  here ac resp failure intubated extubated   very poor historian  out of icu   diarrhea yesterday   on flagyl and better          Diarrhea of presumed infectious origin  Metabolic encephalopathy  ESRD (end stage renal disease)  Acute on chronic systolic congestive heart failure  NSTEMI (non-ST elevated myocardial infarction)  Acute respiratory failure with hypoxia  Diabetes Mellitus  CHF (Congestive Heart Failure)  Preventive measure  Dyslipidemia  Type 2 diabetes mellitus with chronic kidney disease on chronic dialysis, with long-term current use of insulin  Essential hypertension  Seizure  Elevated troponin  Weakness      ROS:  CONSTITUTIONAL:  Negative fever or chills, feels well, good appetite  EYES:  Negative  blurry vision or double vision  CARDIOVASCULAR:  Negative for chest pain or palpitations  RESPIRATORY:  Negative for cough, wheezing, or SOB   GASTROINTESTINAL:  Negative for nausea, vomiting, diarrhea, constipation, or abdominal pain  GENITOURINARY:  Negative frequency, urgency or dysuria  NEUROLOGIC:  No headache, confusion, dizziness, lightheadedness    Allergies    No Known Allergies    Intolerances        ANTIBIOTICS/RELEVANT:  antimicrobials  metroNIDAZOLE    Tablet 500milliGRAM(s) Oral every 8 hours    immunologic:    OTHER:  atorvastatin 40milliGRAM(s) Oral at bedtime  clopidogrel Tablet 75milliGRAM(s) Oral daily  dextrose 5%. 1000milliLiter(s) IV Continuous <Continuous>  dextrose Gel 1Dose(s) Oral once PRN  dextrose 50% Injectable 12.5Gram(s) IV Push once  dextrose 50% Injectable 25Gram(s) IV Push once  dextrose 50% Injectable 25Gram(s) IV Push once  aspirin  chewable 81milliGRAM(s) Oral daily  acetaminophen   Tablet 650milliGRAM(s) Oral every 6 hours PRN  insulin lispro (HumaLOG) corrective regimen sliding scale  SubCutaneous Before meals and at bedtime  metoprolol 25milliGRAM(s) Oral two times a day  amLODIPine   Tablet 10milliGRAM(s) Oral daily  heparin  Injectable 5000Unit(s) SubCutaneous every 12 hours  cholestyramine Powder (Sugar-Free) 4Gram(s) Oral two times a day  insulin glargine Injectable (LANTUS) 4Unit(s) SubCutaneous at bedtime  regadenoson Injectable 0.4milliGRAM(s) IV Push once PRN      Objective:  Vital Signs Last 24 Hrs  T(C): 36.9, Max: 36.9 (04-10 @ 11:15)  T(F): 98.4, Max: 98.4 (04-10 @ 11:15)  HR: 60 (57 - 80)  BP: 145/58 (137/67 - 160/67)  BP(mean): --  RR: 16 (16 - 17)  SpO2: 100% (98% - 100%)    PHYSICAL EXAM:  Constitutional:Well-developed, well nourishe  Eyes:ZENA, EOMI  Ear/Nose/Throat: no oral lesion, no sinus tenderness on percussion	  Neck:no JVD, no lymphadenopathy, supple  Respiratory: CTA ap  Cardiovascular: S1S2 RRR, no murmurs  Gastrointestinal:soft, (+) BS, no HSM  Extremities:no e/e/c        LABS:        TPro  6.5  /  Alb  2.4<L>  /  TBili  0.4  /  DBili  .12  /  AST  32  /  ALT  40  /  AlkPhos  63  04-09            MICROBIOLOGY:        RADIOLOGY & ADDITIONAL STUDIES: infectious diseases progress note:    Pt. is a 87 y/o female w/pmhx of HTN, DM-2 (on lantus 16u qhs), Seizure d/o (not taking keppra), ESRD on HD m,w,f was in [facility] till friday while in HD pt pulled catheter and later was told there was fistula clot, went to vascular yesterday andafte had full HD, son states after HD pt felt weak. states she was just not herself and brings her in, no seizure like activity noted, no cp, palitations n/v/d/c no fever or chills. (02 Apr 2017 05:01)  here ac resp failure intubated extubated   very poor historian  out of icu   diarrhea yesterday   on flagyl and better  does not like the   otherwise ok           Diarrhea of presumed infectious origin  Metabolic encephalopathy  ESRD (end stage renal disease)  Acute on chronic systolic congestive heart failure  NSTEMI (non-ST elevated myocardial infarction)  Acute respiratory failure with hypoxia  Diabetes Mellitus  CHF (Congestive Heart Failure)  Preventive measure  Dyslipidemia  Type 2 diabetes mellitus with chronic kidney disease on chronic dialysis, with long-term current use of insulin  Essential hypertension  Seizure  Elevated troponin  Weakness      ROS:  CONSTITUTIONAL:  Negative fever or chills, feels well, good appetite  CARDIOVASCULAR:  Negative for chest pain or palpitations  RESPIRATORY:  Negative for cough, wheezing, or SOB   GASTROINTESTINAL:  Negative for nausea, vomiting, diarrhea, constipation, or abdominal pain      Allergies    No Known Allergies    Intolerances        ANTIBIOTICS/RELEVANT:  antimicrobials  metroNIDAZOLE    Tablet 500milliGRAM(s) Oral every 8 hours    immunologic:    OTHER:  atorvastatin 40milliGRAM(s) Oral at bedtime  clopidogrel Tablet 75milliGRAM(s) Oral daily  dextrose 5%. 1000milliLiter(s) IV Continuous <Continuous>  dextrose Gel 1Dose(s) Oral once PRN  dextrose 50% Injectable 12.5Gram(s) IV Push once  dextrose 50% Injectable 25Gram(s) IV Push once  dextrose 50% Injectable 25Gram(s) IV Push once  aspirin  chewable 81milliGRAM(s) Oral daily  acetaminophen   Tablet 650milliGRAM(s) Oral every 6 hours PRN  insulin lispro (HumaLOG) corrective regimen sliding scale  SubCutaneous Before meals and at bedtime  metoprolol 25milliGRAM(s) Oral two times a day  amLODIPine   Tablet 10milliGRAM(s) Oral daily  heparin  Injectable 5000Unit(s) SubCutaneous every 12 hours  cholestyramine Powder (Sugar-Free) 4Gram(s) Oral two times a day  insulin glargine Injectable (LANTUS) 4Unit(s) SubCutaneous at bedtime  regadenoson Injectable 0.4milliGRAM(s) IV Push once PRN      Objective:  Vital Signs Last 24 Hrs  T(C): 36.9, Max: 36.9 (04-10 @ 11:15)  T(F): 98.4, Max: 98.4 (04-10 @ 11:15)  HR: 60 (57 - 80)  BP: 145/58 (137/67 - 160/67)  BP(mean): --  RR: 16 (16 - 17)  SpO2: 100% (98% - 100%)    PHYSICAL EXAM:  Constitutional:Well-developed, well nourished  Eyes:ZENA, EOMI  Ear/Nose/Throat: no oral lesion, no sinus tenderness on percussion	  Neck:no JVD, no lymphadenopathy, supple  Respiratory: CTA ap  Cardiovascular: S1S2 RRR, no murmurs  Gastrointestinal:soft, (+) BS, no HSM;; non tender  Extremities:no e/e/c        LABS:        TPro  6.5  /  Alb  2.4<L>  /  TBili  0.4  /  DBili  .12  /  AST  32  /  ALT  40  /  AlkPhos  63  04-09            MICROBIOLOGY:  stool for clostridium difficille infection i   neg    RADIOLOGY & ADDITIONAL STUDIES:

## 2017-04-10 NOTE — DISCHARGE NOTE ADULT - HOSPITAL COURSE
85 yo F with multiple medical problems, ESRD on HD ,  seizure disorder, CAD (s/p stent about 10 years ago), systolic heart failure, DM (type 2 - on lantus), hypothryoid, initially presented with weakness/lethargy/AMS found to had acute hypoxic respiratory failure s/p intubation, acute on chronic systolic heart failure,NSTEMI, hypotension requiring pressors secondary to cardiogenic shock vs septic shock , being treated empirically for presumed gram negative pneumonia. Pt. off pressors, extubated transferred to tele floor, recurrent watery diarrhea , c.diff negative, being treated with flagyl for diarrhea of presumed infectious origin, diarrhea resolved.  Pt. with acute metabolic encephalopathy , lethargic, now returned to baseline, For NSTEMI, con t with medical management, pt's family declined stress test. Pt. with oxygen sat 95 and above on RA so respiratory issues resolved. Will continue with home regimen of HD M/W/F. Pt. is full assist and will resume home care at home. Is able to sit in chair.

## 2017-04-10 NOTE — PROGRESS NOTE ADULT - PROBLEM SELECTOR PLAN 5
2/2 to underlying condition, patient's mental status improving, returning to baseline

## 2017-04-10 NOTE — PROGRESS NOTE ADULT - PROBLEM SELECTOR PLAN 7
c.diff neg, appreciate GI input , monitor stool count, no procedures at this time  diarrhea resovled, complete 7 day course of flagyl
c.diff neg, appreciate GI input , monitor stool count, no procedures at this time  will continue flagyl overnight , can d/c in am if no further diarrhea
check c.diff, stool studies, fu ID recs, GI consult  will add flagyl for now

## 2017-04-10 NOTE — PROGRESS NOTE ADULT - PROBLEM SELECTOR PROBLEM 2
ESRD (end stage renal disease)
NSTEMI (non-ST elevated myocardial infarction)
NSTEMI (non-ST elevated myocardial infarction)
ESRD (end stage renal disease)
NSTEMI (non-ST elevated myocardial infarction)

## 2017-04-10 NOTE — DISCHARGE NOTE ADULT - CARE PLAN
Principal Discharge DX:	Acute respiratory failure with hypoxia  Goal:	maintain good respiratory status  Instructions for follow-up, activity and diet:	continue with dialysis 3 x week to avoid fluid overload  Secondary Diagnosis:	Acute on chronic combined systolic and diastolic congestive heart failure  Instructions for follow-up, activity and diet:	continue with all cardiac medications  Secondary Diagnosis:	Type 2 diabetes mellitus with hyperglycemia, with long-term current use of insulin  Instructions for follow-up, activity and diet:	Hgb A1C 7.7, finger sticks adequately controlled with only 5 units of lantus daily. Speak with doctor about possibility of changing to only an oral agent  Secondary Diagnosis:	Diarrhea of presumed infectious origin  Instructions for follow-up, activity and diet:	complete two more days of flagyl

## 2017-04-10 NOTE — DISCHARGE NOTE ADULT - PLAN OF CARE
maintain good respiratory status continue with dialysis 3 x week to avoid fluid overload continue with all cardiac medications Hgb A1C 7.7, finger sticks adequately controlled with only 5 units of lantus daily. Speak with doctor about possibility of changing to only an oral agent complete two more days of flagyl

## 2017-04-10 NOTE — DISCHARGE NOTE ADULT - MEDICATION SUMMARY - MEDICATIONS TO STOP TAKING
I will STOP taking the medications listed below when I get home from the hospital:    Cipro 500 mg oral tablet  -- 1 tab(s) by mouth every 12 hours  -- Avoid prolonged or excessive exposure to direct and/or artificial sunlight while taking this medication.  Check with your doctor before becoming pregnant.  Do not take dairy products, antacids, or iron preparations within one hour of this medication.  Finish all this medication unless otherwise directed by prescriber.  Medication should be taken with plenty of water.

## 2017-04-10 NOTE — PROGRESS NOTE ADULT - PROBLEM SELECTOR PLAN 3
continue with HD to keep fluid status stable  increased BP, will add back in home norvasc 10mg
continue with HD to keep fluid status stable  increased BP, will add back in home norvasc 10mg
continue with HD to keep fluid status stable  cardiac meds as per cardiology

## 2017-04-10 NOTE — PROGRESS NOTE ADULT - PROBLEM SELECTOR PROBLEM 3
CHF (Congestive Heart Failure)
Acute on chronic systolic congestive heart failure
CHF (Congestive Heart Failure)

## 2017-04-10 NOTE — SWALLOW BEDSIDE ASSESSMENT ADULT - SLP GENERAL OBSERVATIONS
Pt was seen sitting in the chair during lunch alert and oriented to self and place. Pt followed 1-step directions and verbalized wants and needs. Noted some confusion.

## 2017-04-10 NOTE — PROGRESS NOTE ADULT - PROBLEM SELECTOR PLAN 6
glucose in good control, c/w ISS, lantus
glucose in good control, d/c home on decreased dose of  lantus to avoid hypoglycemia
glucose in good control, c/w ISS, lantus

## 2017-04-10 NOTE — PROGRESS NOTE ADULT - PROBLEM SELECTOR PLAN 4
HD as per renal  plan for HD for tomorrow  outpt schedule in M/W/F
HD as per renal  will do HD today in order to get back on track with regular schedule M/W/F
HD as per renal  plan for HD today

## 2017-04-10 NOTE — PROGRESS NOTE ADULT - PROBLEM SELECTOR PROBLEM 7
Diarrhea of presumed infectious origin

## 2017-04-10 NOTE — PROGRESS NOTE ADULT - SUBJECTIVE AND OBJECTIVE BOX
Assessment:    NSTEMI in setting of ESRD and sepsis, with a history of CAD and 2 stents in the past, DM  Medical management ongoing, ASA, Plavix,  Statin if not contraindicated  IV ABX  Echocardiogram reviewed  EKG reviewed and compared with previous from last hospital admission here, no acute changes noted.   CE trending down  Son refusing Stress test   H/H noted

## 2017-04-10 NOTE — PROGRESS NOTE ADULT - PROBLEM SELECTOR PLAN 2
last troponin trending down  eventual stress test as per cardiology, ?stress test monday
last troponin trending down  eventual stress test as per cardiology, family declined stress test
last troponin trending down  eventual stress test as per cardiology

## 2017-04-10 NOTE — SWALLOW BEDSIDE ASSESSMENT ADULT - SPECIFY REASON(S)
MD order states change diet; Pt was advanced from NGT to pureed diet, would like to advance further MD order states change diet; Pt was advanced from NGT to pureed diet, would like to advance further.

## 2017-04-10 NOTE — DISCHARGE NOTE ADULT - PROVIDER TOKENS
FREE:[LAST:[Amber],FIRST:[Dr. Kasper],PHONE:[phone]],FAX:[phone]    -],ADDRESS:[[place], [place]]]

## 2017-04-10 NOTE — DISCHARGE NOTE ADULT - PATIENT PORTAL LINK FT
“You can access the [employer] Patient Portal, offered by [facility], by registering with the following website: [email or web address]”

## 2017-04-10 NOTE — PROGRESS NOTE ADULT - PROBLEM SELECTOR PROBLEM 1
Acute respiratory failure with hypoxia
Acute respiratory failure with hypoxia
Diarrhea of presumed infectious origin
Diarrhea of presumed infectious origin
Acute respiratory failure with hypoxia
Diarrhea of presumed infectious origin
Diarrhea of presumed infectious origin

## 2017-04-10 NOTE — PROGRESS NOTE ADULT - SUBJECTIVE AND OBJECTIVE BOX
Patient is a 86y old  Female who presents with a chief complaint of Daughter stated that patient was unresponsive at home. (02 Apr 2017 15:53)      INTERVAL HPI/OVERNIGHT EVENTS:  no new evetns  pt. feels well, no diarrhea    MEDICATIONS  (STANDING):  atorvastatin 40milliGRAM(s) Oral at bedtime  clopidogrel Tablet 75milliGRAM(s) Oral daily  dextrose 5%. 1000milliLiter(s) IV Continuous <Continuous>  dextrose 50% Injectable 12.5Gram(s) IV Push once  dextrose 50% Injectable 25Gram(s) IV Push once  dextrose 50% Injectable 25Gram(s) IV Push once  aspirin  chewable 81milliGRAM(s) Oral daily  insulin lispro (HumaLOG) corrective regimen sliding scale  SubCutaneous Before meals and at bedtime  metoprolol 25milliGRAM(s) Oral two times a day  amLODIPine   Tablet 10milliGRAM(s) Oral daily  heparin  Injectable 5000Unit(s) SubCutaneous every 12 hours  metroNIDAZOLE    Tablet 500milliGRAM(s) Oral every 8 hours  cholestyramine Powder (Sugar-Free) 4Gram(s) Oral two times a day  insulin glargine Injectable (LANTUS) 4Unit(s) SubCutaneous at bedtime    MEDICATIONS  (PRN):  dextrose Gel 1Dose(s) Oral once PRN Blood Glucose LESS THAN 70 milliGRAM(s)/deciliter  acetaminophen   Tablet 650milliGRAM(s) Oral every 6 hours PRN For Temp greater than 38 C (100.4 F)  regadenoson Injectable 0.4milliGRAM(s) IV Push once PRN to be administered during stress test      Allergies    No Known Allergies    Intolerances        REVIEW OF SYSTEMS:  dificult to assess, offers no complaints, but unreliable historian    Vital Signs Last 24 Hrs  T(C): 36.9, Max: 36.9 (04-10 @ 11:15)  T(F): 98.4, Max: 98.4 (04-10 @ 11:15)  HR: 67 (57 - 80)  BP: 140/60 (137/67 - 160/67)  BP(mean): --  RR: 16 (16 - 17)  SpO2: 100% (98% - 100%)    PHYSICAL EXAM:  GENERAL: NAD, awake and alert , sitting in chair  HEAD:  Atraumatic, Normocephalic  EYES: EOMI, PERRLA, conjunctiva and sclera clear  ENMT: No tonsillar erythema, exudates, or enlargement; Moist mucous membranes, Good dentition, No lesions  NECK: Supple, No JVD, Normal thyroid  NERVOUS SYSTEM:  Alert & Oriented X3, Good concentration; Motor Strength 5/5 B/L upper and lower extremities; DTRs 2+ intact and symmetric  CHEST/LUNG: Clear to percussion bilaterally; No rales, rhonchi, wheezing, or rubs  HEART: Regular rate and rhythm; No murmurs, rubs, or gallops  ABDOMEN: Soft, Nontender, Nondistended; Bowel sounds present  EXTREMITIES:  2+ Peripheral Pulses, No clubbing, cyanosis, or edema  LYMPH: No lymphadenopathy noted  SKIN: No rashes or lesions    LABS:        TPro  6.5  /  Alb  2.4<L>  /  TBili  0.4  /  DBili  .12  /  AST  32  /  ALT  40  /  AlkPhos  63  04-09        CAPILLARY BLOOD GLUCOSE  117 (10 Apr 2017 11:38)  113 (10 Apr 2017 08:30)  122 (09 Apr 2017 22:01)  215 (09 Apr 2017 16:26)      RADIOLOGY & ADDITIONAL TESTS:    Imaging Personally Reviewed:  [x ] YES  [ ] NO    Consultant(s) Notes Reviewed:  [ x] YES  [ ] NO    Care Discussed with Consultants/Other Providers [x ] YES  [ ] NO

## 2017-04-11 VITALS
HEART RATE: 59 BPM | OXYGEN SATURATION: 99 % | DIASTOLIC BLOOD PRESSURE: 50 MMHG | TEMPERATURE: 98 F | RESPIRATION RATE: 16 BRPM | SYSTOLIC BLOOD PRESSURE: 137 MMHG

## 2017-04-11 LAB
CULTURE RESULTS: SIGNIFICANT CHANGE UP
SPECIMEN SOURCE: SIGNIFICANT CHANGE UP

## 2017-04-11 PROCEDURE — 99239 HOSP IP/OBS DSCHRG MGMT >30: CPT

## 2017-04-11 RX ADMIN — AMLODIPINE BESYLATE 10 MILLIGRAM(S): 2.5 TABLET ORAL at 05:33

## 2017-04-11 RX ADMIN — Medication 50 MILLIGRAM(S): at 05:33

## 2017-04-11 RX ADMIN — Medication 500 MILLIGRAM(S): at 05:33

## 2017-04-11 RX ADMIN — HEPARIN SODIUM 5000 UNIT(S): 5000 INJECTION INTRAVENOUS; SUBCUTANEOUS at 05:33

## 2017-04-11 NOTE — PROGRESS NOTE ADULT - SUBJECTIVE AND OBJECTIVE BOX
Assessment:    NSTEMI in setting of ESRD and sepsis, with a history of CAD and 2 stents in the past, DM  Medical management ongoing, ASA, Plavix,  Statin if not contraindicated  IV ABX  Echocardiogram reviewed  EKG reviewed and compared with previous from last hospital admission here, no acute changes noted.   CE trending down  Son refusing Stress test   DC

## 2017-04-11 NOTE — PROGRESS NOTE ADULT - PROVIDER SPECIALTY LIST ADULT
Critical Care
Critical Care
Gastroenterology
Gastroenterology
Hospitalist
Nephrology
Cardiology
Infectious Disease

## 2017-04-13 DIAGNOSIS — E03.9 HYPOTHYROIDISM, UNSPECIFIED: ICD-10-CM

## 2017-04-13 DIAGNOSIS — I50.43 ACUTE ON CHRONIC COMBINED SYSTOLIC (CONGESTIVE) AND DIASTOLIC (CONGESTIVE) HEART FAILURE: ICD-10-CM

## 2017-04-13 DIAGNOSIS — I13.2 HYPERTENSIVE HEART AND CHRONIC KIDNEY DISEASE WITH HEART FAILURE AND WITH STAGE 5 CHRONIC KIDNEY DISEASE, OR END STAGE RENAL DISEASE: ICD-10-CM

## 2017-04-13 DIAGNOSIS — N17.9 ACUTE KIDNEY FAILURE, UNSPECIFIED: ICD-10-CM

## 2017-04-13 DIAGNOSIS — E11.22 TYPE 2 DIABETES MELLITUS WITH DIABETIC CHRONIC KIDNEY DISEASE: ICD-10-CM

## 2017-04-13 DIAGNOSIS — R65.21 SEVERE SEPSIS WITH SEPTIC SHOCK: ICD-10-CM

## 2017-04-13 DIAGNOSIS — R55 SYNCOPE AND COLLAPSE: ICD-10-CM

## 2017-04-13 DIAGNOSIS — J15.6 PNEUMONIA DUE TO OTHER GRAM-NEGATIVE BACTERIA: ICD-10-CM

## 2017-04-13 DIAGNOSIS — A41.9 SEPSIS, UNSPECIFIED ORGANISM: ICD-10-CM

## 2017-04-13 DIAGNOSIS — N18.6 END STAGE RENAL DISEASE: ICD-10-CM

## 2017-04-13 DIAGNOSIS — I21.4 NON-ST ELEVATION (NSTEMI) MYOCARDIAL INFARCTION: ICD-10-CM

## 2017-04-13 DIAGNOSIS — J96.01 ACUTE RESPIRATORY FAILURE WITH HYPOXIA: ICD-10-CM

## 2017-04-13 DIAGNOSIS — E11.65 TYPE 2 DIABETES MELLITUS WITH HYPERGLYCEMIA: ICD-10-CM

## 2017-04-13 DIAGNOSIS — I25.10 ATHEROSCLEROTIC HEART DISEASE OF NATIVE CORONARY ARTERY WITHOUT ANGINA PECTORIS: ICD-10-CM

## 2017-04-13 DIAGNOSIS — G93.41 METABOLIC ENCEPHALOPATHY: ICD-10-CM

## 2017-04-13 DIAGNOSIS — A04.7 ENTEROCOLITIS DUE TO CLOSTRIDIUM DIFFICILE: ICD-10-CM

## 2017-04-13 DIAGNOSIS — Z79.4 LONG TERM (CURRENT) USE OF INSULIN: ICD-10-CM

## 2017-07-27 ENCOUNTER — INPATIENT (INPATIENT)
Facility: HOSPITAL | Age: 82
LOS: 3 days | Discharge: HOME HEALTH | End: 2017-07-31
Attending: INTERNAL MEDICINE | Admitting: INTERNAL MEDICINE
Payer: MEDICARE

## 2017-07-27 VITALS
HEIGHT: 63 IN | HEART RATE: 117 BPM | TEMPERATURE: 98 F | WEIGHT: 139.99 LBS | OXYGEN SATURATION: 98 % | DIASTOLIC BLOOD PRESSURE: 91 MMHG | SYSTOLIC BLOOD PRESSURE: 122 MMHG | RESPIRATION RATE: 19 BRPM

## 2017-07-27 DIAGNOSIS — I50.9 HEART FAILURE, UNSPECIFIED: Chronic | ICD-10-CM

## 2017-07-27 DIAGNOSIS — Z98.89 OTHER SPECIFIED POSTPROCEDURAL STATES: Chronic | ICD-10-CM

## 2017-07-27 DIAGNOSIS — E11.9 TYPE 2 DIABETES MELLITUS WITHOUT COMPLICATIONS: Chronic | ICD-10-CM

## 2017-07-27 DIAGNOSIS — Z82.49 FAMILY HISTORY OF ISCHEMIC HEART DISEASE AND OTHER DISEASES OF THE CIRCULATORY SYSTEM: Chronic | ICD-10-CM

## 2017-07-27 LAB
ALBUMIN SERPL ELPH-MCNC: 3.2 G/DL — LOW (ref 3.3–5)
ALP SERPL-CCNC: 134 U/L — HIGH (ref 40–120)
ALT FLD-CCNC: 19 U/L — SIGNIFICANT CHANGE UP (ref 12–78)
ANION GAP SERPL CALC-SCNC: 13 MMOL/L — SIGNIFICANT CHANGE UP (ref 5–17)
AST SERPL-CCNC: 19 U/L — SIGNIFICANT CHANGE UP (ref 15–37)
BASOPHILS # BLD AUTO: 0.1 K/UL — SIGNIFICANT CHANGE UP (ref 0–0.2)
BASOPHILS NFR BLD AUTO: 0.8 % — SIGNIFICANT CHANGE UP (ref 0–2)
BILIRUB SERPL-MCNC: 0.3 MG/DL — SIGNIFICANT CHANGE UP (ref 0.2–1.2)
BUN SERPL-MCNC: 40 MG/DL — HIGH (ref 7–23)
CALCIUM SERPL-MCNC: 10.1 MG/DL — SIGNIFICANT CHANGE UP (ref 8.5–10.1)
CHLORIDE SERPL-SCNC: 97 MMOL/L — SIGNIFICANT CHANGE UP (ref 96–108)
CK MB CFR SERPL CALC: 1.3 NG/ML — SIGNIFICANT CHANGE UP (ref 0.5–3.6)
CO2 SERPL-SCNC: 27 MMOL/L — SIGNIFICANT CHANGE UP (ref 22–31)
CREAT SERPL-MCNC: 5.12 MG/DL — HIGH (ref 0.5–1.3)
EOSINOPHIL # BLD AUTO: 0.4 K/UL — SIGNIFICANT CHANGE UP (ref 0–0.5)
EOSINOPHIL NFR BLD AUTO: 2.6 % — SIGNIFICANT CHANGE UP (ref 0–6)
GLUCOSE SERPL-MCNC: 258 MG/DL — HIGH (ref 70–99)
HCT VFR BLD CALC: 35.7 % — SIGNIFICANT CHANGE UP (ref 34.5–45)
HGB BLD-MCNC: 11.3 G/DL — LOW (ref 11.5–15.5)
LIDOCAIN IGE QN: 172 U/L — SIGNIFICANT CHANGE UP (ref 73–393)
LYMPHOCYTES # BLD AUTO: 17.5 % — SIGNIFICANT CHANGE UP (ref 13–44)
LYMPHOCYTES # BLD AUTO: 2.6 K/UL — SIGNIFICANT CHANGE UP (ref 1–3.3)
MCHC RBC-ENTMCNC: 29.2 PG — SIGNIFICANT CHANGE UP (ref 27–34)
MCHC RBC-ENTMCNC: 31.5 GM/DL — LOW (ref 32–36)
MCV RBC AUTO: 92.7 FL — SIGNIFICANT CHANGE UP (ref 80–100)
MONOCYTES # BLD AUTO: 1.6 K/UL — HIGH (ref 0–0.9)
MONOCYTES NFR BLD AUTO: 10.8 % — SIGNIFICANT CHANGE UP (ref 2–14)
NEUTROPHILS # BLD AUTO: 10.1 K/UL — HIGH (ref 1.8–7.4)
NEUTROPHILS NFR BLD AUTO: 68.3 % — SIGNIFICANT CHANGE UP (ref 43–77)
NT-PROBNP SERPL-SCNC: 5050 PG/ML — HIGH (ref 0–450)
PLATELET # BLD AUTO: 217 K/UL — SIGNIFICANT CHANGE UP (ref 150–400)
POTASSIUM SERPL-MCNC: 4.2 MMOL/L — SIGNIFICANT CHANGE UP (ref 3.5–5.3)
POTASSIUM SERPL-SCNC: 4.2 MMOL/L — SIGNIFICANT CHANGE UP (ref 3.5–5.3)
PROT SERPL-MCNC: 8.1 GM/DL — SIGNIFICANT CHANGE UP (ref 6–8.3)
RBC # BLD: 3.86 M/UL — SIGNIFICANT CHANGE UP (ref 3.8–5.2)
RBC # FLD: 17.4 % — HIGH (ref 11–15)
SODIUM SERPL-SCNC: 137 MMOL/L — SIGNIFICANT CHANGE UP (ref 135–145)
TROPONIN I SERPL-MCNC: 0.07 NG/ML — HIGH (ref 0.01–0.04)
WBC # BLD: 14.7 K/UL — HIGH (ref 3.8–10.5)
WBC # FLD AUTO: 14.7 K/UL — HIGH (ref 3.8–10.5)

## 2017-07-27 PROCEDURE — 99285 EMERGENCY DEPT VISIT HI MDM: CPT

## 2017-07-27 PROCEDURE — 70450 CT HEAD/BRAIN W/O DYE: CPT | Mod: 26

## 2017-07-27 RX ORDER — SODIUM CHLORIDE 9 MG/ML
500 INJECTION INTRAMUSCULAR; INTRAVENOUS; SUBCUTANEOUS ONCE
Qty: 0 | Refills: 0 | Status: COMPLETED | OUTPATIENT
Start: 2017-07-27 | End: 2017-07-27

## 2017-07-27 RX ORDER — LEVETIRACETAM 250 MG/1
1000 TABLET, FILM COATED ORAL ONCE
Qty: 0 | Refills: 0 | Status: COMPLETED | OUTPATIENT
Start: 2017-07-27 | End: 2017-07-27

## 2017-07-27 RX ORDER — INSULIN GLARGINE 100 [IU]/ML
12 INJECTION, SOLUTION SUBCUTANEOUS
Qty: 0 | Refills: 0 | COMMUNITY

## 2017-07-27 RX ORDER — ASPIRIN/CALCIUM CARB/MAGNESIUM 324 MG
1 TABLET ORAL
Qty: 0 | Refills: 0 | COMMUNITY

## 2017-07-27 RX ADMIN — Medication 1 MILLIGRAM(S): at 23:15

## 2017-07-27 RX ADMIN — SODIUM CHLORIDE 500 MILLILITER(S): 9 INJECTION INTRAMUSCULAR; INTRAVENOUS; SUBCUTANEOUS at 23:00

## 2017-07-27 RX ADMIN — LEVETIRACETAM 400 MILLIGRAM(S): 250 TABLET, FILM COATED ORAL at 23:31

## 2017-07-27 NOTE — ED ADULT TRIAGE NOTE - CHIEF COMPLAINT QUOTE
as per ems " her daughter in law called us because she witnessed her having a seizure, she has history of seizures but she did not take her seizures for the past 2 years, no seizures until to night, she is currently not talking" gibson [id number]

## 2017-07-28 DIAGNOSIS — N18.6 END STAGE RENAL DISEASE: ICD-10-CM

## 2017-07-28 DIAGNOSIS — R56.9 UNSPECIFIED CONVULSIONS: ICD-10-CM

## 2017-07-28 DIAGNOSIS — E11.22 TYPE 2 DIABETES MELLITUS WITH DIABETIC CHRONIC KIDNEY DISEASE: ICD-10-CM

## 2017-07-28 DIAGNOSIS — J18.1 LOBAR PNEUMONIA, UNSPECIFIED ORGANISM: ICD-10-CM

## 2017-07-28 DIAGNOSIS — Z29.9 ENCOUNTER FOR PROPHYLACTIC MEASURES, UNSPECIFIED: ICD-10-CM

## 2017-07-28 DIAGNOSIS — I10 ESSENTIAL (PRIMARY) HYPERTENSION: ICD-10-CM

## 2017-07-28 PROBLEM — I44.7 LEFT BUNDLE-BRANCH BLOCK, UNSPECIFIED: Chronic | Status: ACTIVE | Noted: 2017-04-02

## 2017-07-28 LAB
ANION GAP SERPL CALC-SCNC: 10 MMOL/L — SIGNIFICANT CHANGE UP (ref 5–17)
APPEARANCE UR: CLEAR — SIGNIFICANT CHANGE UP
BILIRUB UR-MCNC: NEGATIVE — SIGNIFICANT CHANGE UP
BUN SERPL-MCNC: 42 MG/DL — HIGH (ref 7–23)
CALCIUM SERPL-MCNC: 9.8 MG/DL — SIGNIFICANT CHANGE UP (ref 8.5–10.1)
CHLORIDE SERPL-SCNC: 101 MMOL/L — SIGNIFICANT CHANGE UP (ref 96–108)
CO2 SERPL-SCNC: 28 MMOL/L — SIGNIFICANT CHANGE UP (ref 22–31)
COLOR SPEC: YELLOW — SIGNIFICANT CHANGE UP
CREAT SERPL-MCNC: 5.27 MG/DL — HIGH (ref 0.5–1.3)
DIFF PNL FLD: NEGATIVE — SIGNIFICANT CHANGE UP
GLUCOSE SERPL-MCNC: 161 MG/DL — HIGH (ref 70–99)
GLUCOSE UR QL: 250 MG/DL
HCT VFR BLD CALC: 32.2 % — LOW (ref 34.5–45)
HGB BLD-MCNC: 10.1 G/DL — LOW (ref 11.5–15.5)
KETONES UR-MCNC: NEGATIVE — SIGNIFICANT CHANGE UP
LEUKOCYTE ESTERASE UR-ACNC: NEGATIVE — SIGNIFICANT CHANGE UP
MCHC RBC-ENTMCNC: 28.9 PG — SIGNIFICANT CHANGE UP (ref 27–34)
MCHC RBC-ENTMCNC: 31.2 GM/DL — LOW (ref 32–36)
MCV RBC AUTO: 92.5 FL — SIGNIFICANT CHANGE UP (ref 80–100)
NITRITE UR-MCNC: NEGATIVE — SIGNIFICANT CHANGE UP
PH UR: 9 — HIGH (ref 5–8)
PLATELET # BLD AUTO: 144 K/UL — LOW (ref 150–400)
POTASSIUM SERPL-MCNC: 3.7 MMOL/L — SIGNIFICANT CHANGE UP (ref 3.5–5.3)
POTASSIUM SERPL-SCNC: 3.7 MMOL/L — SIGNIFICANT CHANGE UP (ref 3.5–5.3)
PROT UR-MCNC: 100 MG/DL
RBC # BLD: 3.48 M/UL — LOW (ref 3.8–5.2)
RBC # FLD: 18 % — HIGH (ref 11–15)
SODIUM SERPL-SCNC: 139 MMOL/L — SIGNIFICANT CHANGE UP (ref 135–145)
SP GR SPEC: 1.01 — SIGNIFICANT CHANGE UP (ref 1.01–1.02)
UROBILINOGEN FLD QL: NEGATIVE MG/DL — SIGNIFICANT CHANGE UP
WBC # BLD: 11.3 K/UL — HIGH (ref 3.8–10.5)
WBC # FLD AUTO: 11.3 K/UL — HIGH (ref 3.8–10.5)
WBC UR QL: SIGNIFICANT CHANGE UP

## 2017-07-28 PROCEDURE — 99223 1ST HOSP IP/OBS HIGH 75: CPT

## 2017-07-28 PROCEDURE — 71010: CPT | Mod: 26

## 2017-07-28 RX ORDER — INSULIN LISPRO 100/ML
VIAL (ML) SUBCUTANEOUS AT BEDTIME
Qty: 0 | Refills: 0 | Status: DISCONTINUED | OUTPATIENT
Start: 2017-07-28 | End: 2017-07-31

## 2017-07-28 RX ORDER — AMLODIPINE BESYLATE 2.5 MG/1
10 TABLET ORAL DAILY
Qty: 0 | Refills: 0 | Status: DISCONTINUED | OUTPATIENT
Start: 2017-07-28 | End: 2017-07-31

## 2017-07-28 RX ORDER — ACETAMINOPHEN 500 MG
650 TABLET ORAL EVERY 6 HOURS
Qty: 0 | Refills: 0 | Status: DISCONTINUED | OUTPATIENT
Start: 2017-07-28 | End: 2017-07-31

## 2017-07-28 RX ORDER — CEFTRIAXONE 500 MG/1
1 INJECTION, POWDER, FOR SOLUTION INTRAMUSCULAR; INTRAVENOUS EVERY 24 HOURS
Qty: 0 | Refills: 0 | Status: DISCONTINUED | OUTPATIENT
Start: 2017-07-28 | End: 2017-07-31

## 2017-07-28 RX ORDER — DEXTROSE 50 % IN WATER 50 %
12.5 SYRINGE (ML) INTRAVENOUS ONCE
Qty: 0 | Refills: 0 | Status: DISCONTINUED | OUTPATIENT
Start: 2017-07-28 | End: 2017-07-31

## 2017-07-28 RX ORDER — PIPERACILLIN AND TAZOBACTAM 4; .5 G/20ML; G/20ML
3.38 INJECTION, POWDER, LYOPHILIZED, FOR SOLUTION INTRAVENOUS ONCE
Qty: 0 | Refills: 0 | Status: COMPLETED | OUTPATIENT
Start: 2017-07-28 | End: 2017-07-28

## 2017-07-28 RX ORDER — LEVETIRACETAM 250 MG/1
500 TABLET, FILM COATED ORAL EVERY 12 HOURS
Qty: 0 | Refills: 0 | Status: DISCONTINUED | OUTPATIENT
Start: 2017-07-28 | End: 2017-07-28

## 2017-07-28 RX ORDER — AZITHROMYCIN 500 MG/1
500 TABLET, FILM COATED ORAL DAILY
Qty: 0 | Refills: 0 | Status: DISCONTINUED | OUTPATIENT
Start: 2017-07-28 | End: 2017-07-31

## 2017-07-28 RX ORDER — METOCLOPRAMIDE HCL 10 MG
10 TABLET ORAL EVERY 8 HOURS
Qty: 0 | Refills: 0 | Status: DISCONTINUED | OUTPATIENT
Start: 2017-07-28 | End: 2017-07-28

## 2017-07-28 RX ORDER — LEVETIRACETAM 250 MG/1
500 TABLET, FILM COATED ORAL
Qty: 0 | Refills: 0 | Status: DISCONTINUED | OUTPATIENT
Start: 2017-07-28 | End: 2017-07-31

## 2017-07-28 RX ORDER — GLUCAGON INJECTION, SOLUTION 0.5 MG/.1ML
1 INJECTION, SOLUTION SUBCUTANEOUS ONCE
Qty: 0 | Refills: 0 | Status: DISCONTINUED | OUTPATIENT
Start: 2017-07-28 | End: 2017-07-31

## 2017-07-28 RX ORDER — DEXTROSE 50 % IN WATER 50 %
25 SYRINGE (ML) INTRAVENOUS ONCE
Qty: 0 | Refills: 0 | Status: DISCONTINUED | OUTPATIENT
Start: 2017-07-28 | End: 2017-07-31

## 2017-07-28 RX ORDER — SODIUM CHLORIDE 9 MG/ML
1000 INJECTION, SOLUTION INTRAVENOUS
Qty: 0 | Refills: 0 | Status: DISCONTINUED | OUTPATIENT
Start: 2017-07-28 | End: 2017-07-31

## 2017-07-28 RX ORDER — LEVETIRACETAM 250 MG/1
500 TABLET, FILM COATED ORAL
Qty: 0 | Refills: 0 | Status: DISCONTINUED | OUTPATIENT
Start: 2017-07-28 | End: 2017-07-28

## 2017-07-28 RX ORDER — ASPIRIN/CALCIUM CARB/MAGNESIUM 324 MG
81 TABLET ORAL DAILY
Qty: 0 | Refills: 0 | Status: DISCONTINUED | OUTPATIENT
Start: 2017-07-28 | End: 2017-07-31

## 2017-07-28 RX ORDER — SODIUM CHLORIDE 9 MG/ML
1000 INJECTION, SOLUTION INTRAVENOUS
Qty: 0 | Refills: 0 | Status: DISCONTINUED | OUTPATIENT
Start: 2017-07-28 | End: 2017-07-28

## 2017-07-28 RX ORDER — VANCOMYCIN HCL 1 G
1000 VIAL (EA) INTRAVENOUS ONCE
Qty: 0 | Refills: 0 | Status: COMPLETED | OUTPATIENT
Start: 2017-07-28 | End: 2017-07-28

## 2017-07-28 RX ORDER — INSULIN LISPRO 100/ML
VIAL (ML) SUBCUTANEOUS
Qty: 0 | Refills: 0 | Status: DISCONTINUED | OUTPATIENT
Start: 2017-07-28 | End: 2017-07-31

## 2017-07-28 RX ORDER — HEPARIN SODIUM 5000 [USP'U]/ML
5000 INJECTION INTRAVENOUS; SUBCUTANEOUS EVERY 8 HOURS
Qty: 0 | Refills: 0 | Status: DISCONTINUED | OUTPATIENT
Start: 2017-07-28 | End: 2017-07-28

## 2017-07-28 RX ORDER — CLOPIDOGREL BISULFATE 75 MG/1
75 TABLET, FILM COATED ORAL DAILY
Qty: 0 | Refills: 0 | Status: DISCONTINUED | OUTPATIENT
Start: 2017-07-28 | End: 2017-07-31

## 2017-07-28 RX ORDER — DEXTROSE 50 % IN WATER 50 %
1 SYRINGE (ML) INTRAVENOUS ONCE
Qty: 0 | Refills: 0 | Status: DISCONTINUED | OUTPATIENT
Start: 2017-07-28 | End: 2017-07-31

## 2017-07-28 RX ORDER — ATORVASTATIN CALCIUM 80 MG/1
40 TABLET, FILM COATED ORAL AT BEDTIME
Qty: 0 | Refills: 0 | Status: DISCONTINUED | OUTPATIENT
Start: 2017-07-28 | End: 2017-07-31

## 2017-07-28 RX ORDER — SODIUM CHLORIDE 9 MG/ML
1000 INJECTION, SOLUTION INTRAVENOUS
Qty: 0 | Refills: 0 | Status: DISCONTINUED | OUTPATIENT
Start: 2017-07-28 | End: 2017-07-29

## 2017-07-28 RX ORDER — HYDRALAZINE HCL 50 MG
50 TABLET ORAL
Qty: 0 | Refills: 0 | Status: DISCONTINUED | OUTPATIENT
Start: 2017-07-28 | End: 2017-07-31

## 2017-07-28 RX ORDER — IPRATROPIUM/ALBUTEROL SULFATE 18-103MCG
3 AEROSOL WITH ADAPTER (GRAM) INHALATION EVERY 6 HOURS
Qty: 0 | Refills: 0 | Status: DISCONTINUED | OUTPATIENT
Start: 2017-07-28 | End: 2017-07-31

## 2017-07-28 RX ADMIN — HEPARIN SODIUM 5000 UNIT(S): 5000 INJECTION INTRAVENOUS; SUBCUTANEOUS at 07:19

## 2017-07-28 RX ADMIN — Medication 1: at 21:43

## 2017-07-28 RX ADMIN — SODIUM CHLORIDE 75 MILLILITER(S): 9 INJECTION, SOLUTION INTRAVENOUS at 02:25

## 2017-07-28 RX ADMIN — CLOPIDOGREL BISULFATE 75 MILLIGRAM(S): 75 TABLET, FILM COATED ORAL at 21:38

## 2017-07-28 RX ADMIN — Medication 81 MILLIGRAM(S): at 13:38

## 2017-07-28 RX ADMIN — Medication 2: at 09:08

## 2017-07-28 RX ADMIN — Medication: at 13:28

## 2017-07-28 RX ADMIN — Medication 250 MILLIGRAM(S): at 01:27

## 2017-07-28 RX ADMIN — PIPERACILLIN AND TAZOBACTAM 200 GRAM(S): 4; .5 INJECTION, POWDER, LYOPHILIZED, FOR SOLUTION INTRAVENOUS at 01:27

## 2017-07-28 RX ADMIN — Medication 50 MILLIGRAM(S): at 21:39

## 2017-07-28 RX ADMIN — SODIUM CHLORIDE 50 MILLILITER(S): 9 INJECTION, SOLUTION INTRAVENOUS at 07:00

## 2017-07-28 RX ADMIN — CEFTRIAXONE 100 GRAM(S): 500 INJECTION, POWDER, FOR SOLUTION INTRAMUSCULAR; INTRAVENOUS at 09:07

## 2017-07-28 RX ADMIN — ATORVASTATIN CALCIUM 40 MILLIGRAM(S): 80 TABLET, FILM COATED ORAL at 21:39

## 2017-07-28 RX ADMIN — AZITHROMYCIN 500 MILLIGRAM(S): 500 TABLET, FILM COATED ORAL at 13:42

## 2017-07-28 RX ADMIN — LEVETIRACETAM 420 MILLIGRAM(S): 250 TABLET, FILM COATED ORAL at 10:21

## 2017-07-28 RX ADMIN — LEVETIRACETAM 500 MILLIGRAM(S): 250 TABLET, FILM COATED ORAL at 21:39

## 2017-07-28 NOTE — H&P ADULT - PROBLEM SELECTOR PROBLEM 3
Type 2 diabetes mellitus with chronic kidney disease on chronic dialysis, with long-term current use of insulin ESRD (end stage renal disease) on dialysis

## 2017-07-28 NOTE — H&P ADULT - ASSESSMENT
86 year old woman with PMH of HTN, DM-2, Seizure d/o (was on Keppra in the past, DCed after 2 Seizure-free years), ESRD on HD MWF brought in by family for Seizure episode at home, work up also consistent with Pneumonia; patient will require admission for at least 2 midnights as detailed below:

## 2017-07-28 NOTE — CHART NOTE - NSCHARTNOTEFT_GEN_A_CORE
MEDICINE ATTENDING  H&P, labs, imaging reviewed; 86F, type 2 DM, HTN, hypothyroidism, chronic systolic  CHF (EF 45-50 4/2017), ESRD/HD, seizure d/o (off antiepileptics p seizure-free for 2 years), adm p seizure at home; CT head neg, CXR w/emphysematous, fibrotic changes, but no infiltrates, UA neg; labs w/leukocytosis to 14.7 and elev trop 0.065; being tx'd for seizure d/o, r/o acute infection, and r/o cardiac ischemic episode    -continue Keppra  -EEG pending  -Neurology consulted - Dr Bee to see  -telemetry monitoring  -serial troponins  -TTE done 4/2017 w/EF 45-50%, so will hold off on repeating at this time  -continue cardiac regimen as currently  -consulted Cardiology - [facility] to see  -trending leukocytosis - no e/o acute infection, f/u cxs  -obtain CT chest to further eval lung findings  -continue tiw HD txs MEDICINE ATTENDING  Pt seen at bedside, with daughter Dawn; H&P, labs, imaging reviewed; 86F, type 2 DM, HTN, hypothyroidism, chronic systolic  CHF (EF 45-50 4/2017), ESRD/HD, seizure d/o (off antiepileptics p seizure-free for 2 years), adm p seizure at home; CT head neg, CXR w/emphysematous, fibrotic changes, but no infiltrates, UA neg; labs w/leukocytosis to 14.7 and elev trop 0.065; being tx'd for seizure d/o, r/o acute infection, and r/o cardiac ischemic episode    -continue Keppra  -EEG pending  -Neurology consulted - Dr Bee to see  -telemetry monitoring  -serial troponins  -TTE done 4/2017 w/EF 45-50%, so will hold off on repeating at this time  -continue cardiac regimen as currently  -consulted Cardiology - [facility] to see  -trending leukocytosis - no e/o acute infection, f/u cxs  -obtain CT chest to further eval lung findings  -continue tiw HD txs  -minimize Ativan as feasible given pt w/decr mental status and slight restlessness likely 2/2 Ativan effects in elderly pt - pt rec'd dose last night 11pm (not for seizure as per RN, likely for agitation)    All diagnoses, mgmt plans d/w family in detail; all questions answered. MEDICINE ATTENDING  Pt seen at bedside, with daughter Dawn; H&P, labs, imaging reviewed; 86F, type 2 DM, HTN, hypothyroidism, chronic systolic  CHF (EF 45-50 4/2017), ESRD/HD, seizure d/o (off antiepileptics p seizure-free for 2 years), adm p seizure at home; CT head neg, CXR w/emphysematous, fibrotic changes, but no infiltrates, UA neg; labs w/leukocytosis to 14.7 and elev trop 0.065; being tx'd for seizure d/o, r/o acute infection, and r/o cardiac ischemic episode    -continue Keppra - change from IV to po  -EEG pending  -Neurology consulted - Dr Bee to see  -telemetry monitoring  -serial troponins  -TTE done 4/2017 w/EF 45-50%, so will hold off on repeating at this time  -continue cardiac regimen as currently  -consulted Cardiology - [facility] to see  -trending leukocytosis - no e/o acute infection, f/u cxs  -obtain CT chest to further eval lung findings  -continue tiw HD txs  -minimize Ativan as feasible given pt w/decr mental status and slight restlessness likely 2/2 Ativan effects in elderly pt - pt rec'd dose last night 11pm (not for seizure as per RN, likely for agitation)    All diagnoses, mgmt plans d/w family in detail; all questions answered.

## 2017-07-28 NOTE — CONSULT NOTE ADULT - SUBJECTIVE AND OBJECTIVE BOX
HPI:  HPI:  86 year old woman with PMH of HTN, DM-2, Seizure d/o (was on Keppra in the past, DCed after 2 Seizure-free years), ESRD on HD MWF brought in by family for Seizure episode at home.  Family is not available a the time of encounter, patient lethargic s/p Seizure and antiepileptics given in ED so information obtained from ED and from chart.  4 limb shaking was witnessed by daughter today at home.  As per son, patient has had a bad cough for the last several days.  No other history is available.    In the ED, CT head shows no acute changes.  BNP 5050, troponin mildly elevated at 0.065.  Kidney function at baseline.  UA neg for UTI.  CXR shows LLL opacity, official read pending. (2017 01:54)      Chief Complaint:  Patient is a 86y old  Female who presents with a chief complaint of Seizures (2017 01:54)      Review of Systems:    see above       Social History/Family History  SOCHX:   tobacco,  -  alcohol    FMHX: FA/MO  - contributory       Discussed with:  PMD, Family    Physical Exam:    Vital Signs:  Vital Signs Last 24 Hrs  T(C): 36.7 (2017 19:19), Max: 37.1 (2017 11:00)  T(F): 98 (2017 19:19), Max: 98.8 (2017 11:00)  HR: 103 (2017 19:19) (81 - 117)  BP: 152/60 (2017 19:19) (122/91 - 155/84)  BP(mean): --  RR: 18 (2017 19:19) (16 - 20)  SpO2: 98% (2017 19:19) (97% - 99%)  Daily Height in cm: 160.02 (2017 22:28)    Daily Weight in k.6 (2017 18:50)  I&O's Summary    2017 07:01  -  2017 20:33  --------------------------------------------------------  IN: 0 mL / OUT: 2000 mL / NET: -2000 mL          Chest:  Full & symmetric excursion, no increased effort, breath sounds clear  Cardiovascular:  Regular rhythm, S1, S2, no murmur/rub/S3/S4, no carotid/femoral/abdominal bruit, radial/pedal pulses 2+, no edema  Abdomen:  Soft, non-tender, non-distended, normoactive bowel sounds, no HSM      Laboratory:                          10.1   11.3  )-----------( 144      ( 2017 08:55 )             32.2         139  |  101  |  42<H>  ----------------------------<  161<H>  3.7   |  28  |  5.27<H>    Ca    9.8      2017 08:55    TPro  8.1  /  Alb  3.2<L>  /  TBili  0.3  /  DBili  x   /  AST  19  /  ALT  19  /  AlkPhos  134<H>  0727      CARDIAC MARKERS ( 2017 22:58 )  .065 ng/mL / x     / x     / x     / 1.3 ng/mL      CAPILLARY BLOOD GLUCOSE  152 (2017 11:17)  171 (2017 08:15)  267 (2017 22:47)        LIVER FUNCTIONS - ( 2017 22:58 )  Alb: 3.2 g/dL / Pro: 8.1 gm/dL / ALK PHOS: 134 U/L / ALT: 19 U/L / AST: 19 U/L / GGT: x             Urinalysis Basic - ( 2017 01:25 )    Color: Yellow / Appearance: Clear / S.015 / pH: x  Gluc: x / Ketone: Negative  / Bili: Negative / Urobili: Negative mg/dL   Blood: x / Protein: 100 mg/dL / Nitrite: Negative   Leuk Esterase: Negative / RBC: x / WBC 0-2   Sq Epi: x / Non Sq Epi: x / Bacteria: x          Assessment:  Mild troponin elevation in setting of seizure and renal insufficiency  Chronic systolic and diastolic CHF, stable  Continue medical management as written  Recent Echo noted

## 2017-07-28 NOTE — H&P ADULT - PROBLEM SELECTOR PLAN 1
Neurochecks q2h	  Strict Aspiration, Fall & Seizure precautions.   CT head was negative for any acute intracranial event  Neurology consult  EEG  Will re-start Keppra 500 mg PO BID  Ativan 2mg IVP Q6hrs prn for breakthrough seizures.  Reglan PRN  for nausea/vomiting  IVF D5NS @ 80ml/hr for 24hrs or until patient eating, NPO for now given neurological status.  DC fluids once neuro status improves and patient able tolerate PO Neurochecks q2h	  Strict Aspiration, Fall & Seizure precautions.   CT head was negative for any acute intracranial event  Neurology consult  EEG  Will re-start Keppra 500 mg PO BID  Ativan 2mg IVP Q6hrs prn for breakthrough seizures.  Reglan PRN  for nausea/vomiting  IVF D5NS @ 50ml/hr for 24hrs or until patient eating, NPO for now given neurological status.  DC fluids once neuro status improves and patient able tolerate PO

## 2017-07-28 NOTE — H&P ADULT - NSHPPHYSICALEXAM_GEN_ALL_CORE
GENERAL: Lethargic woman lying on stretcher in ED, no obvious distress  HEAD:  Atraumatic, Normocephalic  EYES: EOMI, PERRLA, conjunctiva and sclera clear  ENMT: No tonsillar erythema, exudates, or enlargement; Moist mucous membranes, No lesions  NECK: Supple, No JVD, Normal thyroid  NERVOUS SYSTEM:  Arouseable to voice, not alert or oriented, no tremors.  Patient cannot participate in full neuro exam  CHEST/LUNG: Clear to auscultation bilaterally; No rales, rhonchi, wheezing, or rubs  HEART: Regular rate and rhythm; No murmurs, rubs, or gallops  ABDOMEN: Soft, Nondistended; Bowel sounds present  EXTREMITIES: No clubbing, cyanosis, or edema  SKIN: no rashes or lesions

## 2017-07-28 NOTE — CONSULT NOTE ADULT - SUBJECTIVE AND OBJECTIVE BOX
Historian:     Patient poor historian.  HPI:   ALIYA RAY  86 year old woman with PMH of HTN, DM-2, Seizure d/o (was on Keppra in the past, DCed after 2 Seizure-free years), ESRD on HD MWF brought in by family for Seizure episode at home.  Family is not available a the time of encounter, patient lethargic s/p Seizure and antiepileptics given in ED so information obtained from ED and from chart.  4 limb shaking was witnessed by daughter today at home.  As per son, patient has had a bad cough for the last several days.  No other history is available.    Brain CT: No hemorrhage or mass effect. 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  In the ED, elevated serum BUN and creatinine.  BNP 5050, troponin mildly elevated at 0.065.  Kidney function at baseline.  UA neg for UTI.  CXR shows LLL opacity, official read pending. (2017 01:54)    PAST MEDICAL & SURGICAL HISTORY:  LBBB (left bundle branch block); Seizure; H/O: Hypothyroidism; Hypertension; Acute Renal Insufficiency; CHF (Congestive Heart Failure); Diabetes Mellitus; Dyslipidemia; S/P cardiac cath: 1 stent 10 years ago; Diabetes; FH: HTN (hypertension); Acute CHF  No Past Surgical History    MEDICATIONS  (STANDING):  aspirin  chewable 81 milliGRAM(s) Oral daily  atorvastatin 40 milliGRAM(s) Oral at bedtime  clopidogrel Tablet 75 milliGRAM(s) Oral daily  amLODIPine   Tablet 10 milliGRAM(s) Oral daily  hydrALAZINE 50 milliGRAM(s) Oral two times a day  dextrose 5% + sodium chloride 0.45%. 1000 milliLiter(s) (50 mL/Hr) IV Continuous <Continuous>  cefTRIAXone   IVPB 1 Gram(s) IV Intermittent every 24 hours  azithromycin   Tablet 500 milliGRAM(s) Oral daily  insulin lispro (HumaLOG) corrective regimen sliding scale   SubCutaneous three times a day before meals  insulin lispro (HumaLOG) corrective regimen sliding scale   SubCutaneous at bedtime  dextrose 5%. 1000 milliLiter(s) (50 mL/Hr) IV Continuous <Continuous>  dextrose 50% Injectable 12.5 Gram(s) IV Push once  dextrose 50% Injectable 25 Gram(s) IV Push once  dextrose 50% Injectable 25 Gram(s) IV Push once  levETIRAcetam 500 milliGRAM(s) Oral two times a day    MEDICATIONS  (PRN):  ALBUTerol/ipratropium for Nebulization 3 milliLiter(s) Nebulizer every 6 hours PRN Shortness of Breath and/or Wheezing  acetaminophen   Tablet 650 milliGRAM(s) Oral every 6 hours PRN For Temp greater than 38 C (100.4 F)  guaiFENesin    Syrup 200 milliGRAM(s) Oral every 6 hours PRN Cough  dextrose Gel 1 Dose(s) Oral once PRN Blood Glucose LESS THAN 70 milliGRAM(s)/deciliter  glucagon  Injectable 1 milliGRAM(s) IntraMuscular once PRN Glucose LESS THAN 70 milligrams/deciliter    Allergies: No Known Allergies  Intolerances  FAMILY HISTORY:  No pertinent family history in first degree relatives    Vital Signs Last 24 Hrs  T(C): 37.1 (2017 11:00), Max: 37.1 (2017 11:00)  T(F): 98.8 (2017 11:00), Max: 98.8 (2017 11:00)  HR: 84 (2017 11:00) (84 - 117)  BP: 132/60 (2017 11:00) (122/91 - 155/84)  BP(mean): --  RR: 18 (2017 11:00) (16 - 20)  SpO2: 98% (2017 11:00) (97% - 98%)    NEUROLOGICAL EXAM:  HENT:  Normocephalic head; atraumatic head.  Neck supple.  ENT: normal looking.  Mental State:    Awake; poorly cooperative.  in no acute distress;     Cranial Nerves:  II-XII:   Pupils round and reactive to light.   Facial symmetry intact.  Tongue midline.  Motor Functions:  Moves all extremities.      Sensory Functions:   Intact to touch and pinprick to face and extremities.    Reflexes:  Deep tendon reflexes normoactive to knees and ankles.  Babinski absent   Cerebellar Testing:    Unable to cooperate.  Neurovascular: Carotid auscultation full without bruits.      LABS:                        10.1   11.3  )-----------( 144      ( 2017 08:55 )             32.2     07-    139  |  101  |  42<H>  ----------------------------<  161<H>  3.7   |  28  |  5.27<H>    Ca    9.8      2017 08:55    TPro  8.1  /  Alb  3.2<L>  /  TBili  0.3  /  DBili  x   /  AST  19  /  ALT  19  /  AlkPhos  134<H>          Urinalysis Basic - ( 2017 01:25 )    Color: Yellow / Appearance: Clear / S.015 / pH: x  Gluc: x / Ketone: Negative  / Bili: Negative / Urobili: Negative mg/dL   Blood: x / Protein: 100 mg/dL / Nitrite: Negative   Leuk Esterase: Negative / RBC: x / WBC 0-2   Sq Epi: x / Non Sq Epi: x / Bacteria: x        RADIOLOGY & ADDITIONAL STUDIES:    CT Head No Cont: Urgent   Indication: seizure  Transport: Stretcher-Crib  Exam Completed  Provider's Contact #: [phone] ( @ 22:50)  Complete Blood Count + Automated Diff: STAT ( @ 22:50)  Comprehensive Metabolic Panel: STAT ( @ 22:50)  CKMB Mass Assay: STAT ( @ 22:50)  Troponin I, Serum: STAT ( @ 22:50)  Lipase, Serum: STAT ( @ 22:50)  Serum Pro-Brain Natriuretic Peptide: STAT ( @ 22:50)  Culture - Urine: Routine  Specimen Source: Catheterized ( @ 22:50)  Urinalysis: STAT ( @ 22:50)  Straight Cath for Residual Urine:     Time/Priority:  STAT ( @ 22:50)  sodium chloride 0.9% Bolus:   500 milliLiter(s), IV Bolus, once, infuse over 1 Hour(s), Stop After 1 Doses  Provider's Contact #: [phone] ( @ 22:52)  LORazepam    IVPB: [Ordered as ATIVAN IVPB]  1 milliGRAM(s) in dextrose 5% 50 milliLiter(s), IV Intermittent, Once, infuse over 15 Minute(s), Stop After 1 Doses  Administration Instructions: This is a Look-alike/Sound-alike Medication  Provider's Contact #: [phone] ( @ 23:07)  levETIRAcetam  IVPB: [Ordered as KEPPRA IVPB]  1000 milliGRAM(s) in IV Solution 100 milliLiter(s), IV Intermittent, once, infuse over 15 Minute(s), Stop After 1 Doses  Administration Instructions: This is a Look-alike/Sound-alike Medication  Provider's Contact #: [phone] ( @ 23:07)  (Floorstock):   Qty Removed: 1 each ( @ 23:09)  LORazepam   Injectable: [Known as ATIVAN Injectable]  1 milliGRAM(s), IV Push, Once, Stop After 1 Doses  Administration Instructions: This is a Look-alike/Sound-alike Medication  Provider's Contact #: [phone] ( @ 23:21)  Xray Chest 1 View AP/PA.: Urgent   Indication: seizure  Transport: Stretcher-Crib  Exam Completed  Provider's Contact #: [phone] ( @ 00:28)  Culture - Blood: Routine  Specimen Source: Blood ( @ 00:47)  Culture - Blood: Routine  Specimen Source: Blood ( @ 00:47)  vancomycin  IVPB:   1000 milliGRAM(s) in dextrose 5% 250 milliLiter(s), IV Intermittent, once, infuse over 60 Minute(s), Stop After 1 Doses  Indication: pna  Provider's Contact #: [phone] ( @ 00:47)  piperacillin/tazobactam IVPB.: [Known as ZOSYN IVPB.]  3.375 Gram(s) in dextrose 5% 100 milliLiter(s), IV Intermittent, once, infuse over 30 Minute(s), Stop After 1 Doses  Indication: pna ( @ 00:47)  Admit to Inpatient Level of Care:     Service:  TELEMETRY    Physician:  Qasim Keyes    Additional Instructions:  Diagnosis: Pneumonia of right lower lobe due to infectious organism; Seizure  Isolation: None  Special Consideration: None ( @ 01:07)  Admit from ED ( @ 01:21)  Urine Microscopic-Add On (NC): 01:15 ( @ 01:27)  (ADM OVERRIDE):   Qty Removed: 1 each  Route - Dose Given <see task> ( @ 01:27)  (ADM OVERRIDE):   Qty Removed: 1 each  Route - Dose Given <see task> ( @ 01:27)  Admit to Inpatient Level of Care:     Service:  Telemetry    Physician:  Jean Carlos Keyes (:50)  Remote Telemetry/EKG EXCEPT Off Unit Tests:     Time/Priority:  Routine ( @ 01:50)  Diet, NPO (:50)  Fall Risk Protocol:     Time/Priority:  Routine ( @ 01:50)  Aspiration Precautions:     Time/Priority:  Routine ( @ :50)  Seizure Precautions:     Time/Priority:  Routine (:50)  Vital Signs:     Frequency:  per routine (:50)  Assess Neurological Status:     Type of Neuro Check:  General    Frequency:  every 2 hours ( @ 01:50)  heparin  Injectable:   5000 Unit(s), SubCutaneous, every 8 hours  Provider's Contact #: [phone] ( @ 01:50)  LORazepam   Injectable: [Ordered as ATIVAN Injectable]  2 milliGRAM(s), IntraMuscular, every 6 hours, PRN for Seizures  Administration Instructions: This is a Look-alike/Sound-alike Medication  Provider's Contact #: [phone] ( @ 01:51)  aspirin  chewable:   81 milliGRAM(s), Oral, daily  Provider's Contact #: [phone] ( @ 01:52)  atorvastatin: [Known as LIPITOR]  40 milliGRAM(s), Oral, at bedtime  Provider's Contact #: [phone] ( @ 01:52)  clopidogrel Tablet: [Known as PLAVIX]  75 milliGRAM(s), Oral, daily  Provider's Contact #: [phone] ( @ 01:52)  amLODIPine   Tablet: [Known as NORVASC]  10 milliGRAM(s), Oral, daily  Administration Instructions: This is a Look-alike/Sound-alike Medication  Provider's Contact #: [phone] ( @ 01:52)  hydrALAZINE: [Known as APRESOLINE]  50 milliGRAM(s), Oral, two times a day  Administration Instructions: This is a Look-alike/Sound-alike Medication  Provider's Contact #: [phone] ( @ 01:52)  dextrose 5% + sodium chloride 0.45%.: Solution, 1000 milliLiter(s) infuse at 75 mL/Hr, Stop After 24 Hours  Provider's Contact #: [phone] ( @ 01:54)  dextrose 5% + sodium chloride 0.45%.: Solution, 1000 milliLiter(s) infuse at 60 mL/Hr, Stop After 24 Hours  Provider's Contact #: [phone] ( @ 02:49)  dextrose 5% + sodium chloride 0.45%.: Solution, 1000 milliLiter(s) infuse at 50 mL/Hr, Stop After 24 Hours  Provider's Contact #: [phone] ( @ 02:51)  EEG Awake or Drowsy:   Transport: Stretcher-Crib  Hemorrhage:No  Brain Death: No  MI:No  Sickle Cell:No   Stimulants:No  Anti-Convulsants:Yes   Anti-Depressants:No  Contr Substances:No   Ordering Provider's Pager/Contact #:[phone] ( @ 02:58)  levETIRAcetam: [Ordered as KEPPRA]  500 milliGRAM(s), Oral, two times a day  Administration Instructions: This is a Look-alike/Sound-alike Medication  Provider's Contact #: [phone] ( @ 02:58)  metoclopramide Injectable: [Ordered as REGLAN Injectable]  10 milliGRAM(s), IV Push, every 8 hours, PRN for nausea and vomiting  Administration Instructions: Max IV Push dose 10 milliGRAM(s)  IF IV PUSH, ADMINISTER OVER 2 MIN  Provider's Contact #: [phone] ( @ 02:58)  cefTRIAXone   IVPB: [Ordered as ROCEPHIN  IVPB]  1 Gram(s) in dextrose 5% 50 milliLiter(s), IV Intermittent, every 24 hours, infuse over 30 Minute(s)  Indication: pneumonia  Administration Instructions: This is a Look-alike/Sound-alike Medication  Provider's Contact #: [phone] ( @ 02:58)  azithromycin   Tablet: [Ordered as ZITHROMAX]  500 milliGRAM(s), Oral, daily  Indication: pneumonia  Provider's Contact #: [phone] ( 02:58)  Oxygen Delivery Therapy:   Oxygen Method: Nasal Cannula  LPM: 2 (:58)  Culture - Sputum: Routine  Specimen Source: Sputum (:58)  ALBUTerol/ipratropium for Nebulization: Known as DUONEB  3 milliLiter(s), Nebulizer, every 6 hours, PRN for Shortness of Breath and/or Wheezing  Special Instructions: Continue small volume nebulizer treatment until respiratory assessment and patient education determines treatment can be converted to MDI per Pharmacy [facility] Therapeutics protocol. Discontinue nebulizer order once patient has converted to MDI.  Below is the criteria that must be met to make the conversion:  1.Patient is cooperative, alert, oriented & follows instructions  2.Patient has sufficient pre-bronchodilator inspiratory flow to be able to take a slow, deep inspiration and hold their breath f  Administration Instructions: Contains: 2.5 mG albuterol and 0.5 mG ipratropium  This is a Look-alike/Sound-alike Medication  Provider's Contact #: [phone] ( 02:58)  Complete Blood Count: 08:00 ( @ :58)  Basic Metabolic Panel: 08:00 ( @ 02:58)  acetaminophen   Tablet: [Ordered as TYLENOL]  650 milliGRAM(s), Oral, every 6 hours, PRN for For Temp greater than 38 C (100.4 F)  Administration Instructions: MAX DAILY DOSE:  ADULT = 4,000 mG/Day  Provider's Contact #: [phone] ( 02:58)  guaiFENesin    Syrup: [Ordered as ROBITUSSIN]  200 milliGRAM(s), Oral, every 6 hours, PRN for Cough  Administration Instructions: This is a Look-alike/Sound-alike Medication  Provider's Contact #: [phone] ( 02:58)  Blood Glucose Point Of Care Testing:     Frequency:  every 6 hours    Additional Instructions:  If NPO (:58)  Blood Glucose Point Of Care Testing:     Frequency:  every 15 minutes    Additional Instructions:  After carbohydrate administration for hypoglycemia, repeat every 15 minute(s) until blood glucose is GREATER THAN or EQUAL  milliGRAM(s)/deciLiter twice consecutively ( 02:58)  Notify Provider For:     Additional Instructions:  Blood glucose LESS THAN 70 milliGRAM(s)/deciLiter or GREATER THAN 400 milliGRAM(s)/deciLiter ( @ 02:58)  Education:     Diabetes    Other: Diet, Exercise    Additional Instructions: Diet, Exercise, Diabetes ( @ 02:58)  insulin lispro (HumaLOG) corrective regimen sliding scale:       2 Unit(s) if Glucose 151 - 200      4 Unit(s) if Glucose 201 - 250      6 Unit(s) if Glucose 251 - 300      8 Unit(s) if Glucose 301 - 350      10 Unit(s) if Glucose 351 - 400      12 Unit(s) if Glucose Greater Than 400 + Contact MD  SubCutaneous, three times a day before meals  Special Instructions: Give correctional scale insulin REGARDLESS of PO status NOTIFY Provider for blood glucose LESS THAN 70 milliGRAM(s)/deciLiter or above 400 milliGRAM(s)/deciLiter.  Administration Instructions: *Per Sliding Scale*  Dispose unused medication in BLACK bin.  This is a Look-alike/Sound-alike Medication  Provider's Contact #: [phone] ( @ 02:58)  insulin lispro (HumaLOG) corrective regimen sliding scale:       0 Unit(s) if Glucose 0 - 250      1 Unit(s) if Glucose 251 - 300      2 Unit(s) if Glucose 301 - 350      3 Unit(s) if Glucose 351 - 400      4 Unit(s) if Glucose Greater Than 400 + Contact Provider  SubCutaneous, at bedtime  Special Instructions: Give correctional scale insulin REGARDLESS of PO status NOTIFY Provider for blood glucose LESS THAN 70 milliGRAM(s)/deciLiter or above 400 milliGRAM(s)/deciLiter.  Administration Instructions: Dispose unused medication in BLACK bin.  This is a Look-alike/Sound-alike Medication  Provider's Contact #: [phone] ( @ 02:58)  dextrose 5%.: Solution, 1000 milliLiter(s) infuse at 50 mL/Hr  Special Instructions: Conditional Order: HYPOGLYCEMIA PROTOCOL  Provider's Contact #: [phone] ( @ 02:58)  Administer Carbohydrates:     Additional Instructions:  HYPOGLYCEMIA PROTOCOL ( @ 02:58)  dextrose Gel: [Known as GLUTOSE]  1 Dose(s), Oral, once, PRN for Blood Glucose LESS THAN 70 milliGRAM(s)/deciliter, Stop After 1 Doses  Special Instructions: Conditional Order: HYPOGLYCEMIA PROTOCOL  Provider's Contact #: [phone] ( @ 02:58)  dextrose 50% Injectable:   12.5 Gram(s), IV Push, once, Stop After 1 Doses  Special Instructions: Conditional Order: HYPOGLYCEMIA PROTOCOL  Provider's Contact #: [phone] ( @ 02:58)  dextrose 50% Injectable:   25 Gram(s), IV Push, once, Stop After 1 Doses  Special Instructions: Conditional Order: HYPOGLYCEMIA PROTOCOL  Provider's Contact #: [phone] ( @ 02:58)  dextrose 50% Injectable:   25 Gram(s), IV Push, once, Stop After 1 Doses  Special Instructions: Conditional Order: HYPOGLYCEMIA PROTOCOL  Provider's Contact #: [phone] ( @ 02:58)  glucagon  Injectable:   1 milliGRAM(s), IntraMuscular, once, PRN for Glucose LESS THAN 70 milligrams/deciliter, Stop After 1 Doses  Special Instructions: Conditional Order: HYPOGLYCEMIA PROTOCOL  Provider's Contact #: [phone] ( @ 02:58)  Provider to RN:       UNRESPONSIVE patient and Blood Glucose LESS THAN 70 milliGRAM(s)/deciLiter call Rapid Response.  HYPOGLYCEMIA PROTOCOL ( @ 02:58)  Consult- PT Evaluate and Treat:   *Reason for Consult - Must select at least one choice*     Neuro Event  Weight Bearing Restrictions: No  Additional Information: S/p seizure ( @ 02:58)  12 Lead ECG ( @ 06:21)  levETIRAcetam  IVPB: [Ordered as KEPPRA IVPB]  500 milliGRAM(s) in IV Solution 100 milliLiter(s), IV Intermittent, every 12 hours, infuse over 15 Minute(s)  Administration Instructions: This is a Look-alike/Sound-alike Medication  Provider's Contact #: [phone] ( @ 08:32)  CT Chest No Cont: Routine   Indication: abnl cxr  Transport: Stretcher-Crib  Provider's Contact #: [phone] ( @ 10:14)  Complete Blood Count: AM Sched. Collection: 2017 05:00 ( @ 10:14)  Basic Metabolic Panel: AM Sched. Collection: 2017 05:00 ( @ 10:14)  Compression Device Sequential:     Body Side:  Bilateral ( @ 10:16)  levETIRAcetam: [Ordered as KEPPRA]  500 milliGRAM(s), Oral, two times a day  Administration Instructions: This is a Look-alike/Sound-alike Medication  Provider's Contact #: [phone] ( @ 11:31)  Diet, Regular:   Consistent Carbohydrate Evening Snack  DASH/TLC Sodium & Cholesterol Restricted ( @ 12:10)  Hemodialysis Treatment:     Dialyzer:  Revaclear 300    Blood Flow:  400    Hours Of Treatment:  3.5 hour(s)    Dialysate(mL/minute):  600    Dialysate Bath Potassium: 2K+    Dialysate Bath Calcium: 2.5mEq/L    Primer: Without Heparin    Access: Arterio Venous Fistula    Desired Weight Loss/E.D.W.:  2 kg    Maintain Systolic Blood Pressure >(mmHg):100    Additional Instructions: [place] [facility] ( @ 12:13)    Assessment & Opinion:   Seizure Disorder, generalized.  Pneumonia.  ESRD.  Recommendations:  Adjust her anticonvulsant medications.  EEG.   DVT prophylaxis as ordered.  Continue all current medications. Historian:     Patient poor historian.  HPI:   ALIYA RAY  86 year old woman with PMH of HTN, DM-2, Seizure d/o (was on Keppra in the past, DCed after 2 Seizure-free years), ESRD on HD MWF brought in by family for Seizure episode at home.  Family is not available a the time of encounter, patient lethargic s/p Seizure and antiepileptics given in ED so information obtained from ED and from chart.  4 limb shaking was witnessed by daughter today at home.  As per son, patient has had a bad cough for the last several days.  No other history is available.    Brain CT: No hemorrhage or mass effect. 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  In the ED, elevated serum BUN and creatinine.  BNP 5050, troponin mildly elevated at 0.065.  Kidney function at baseline.  UA neg for UTI.  CXR shows LLL opacity, official read pending. (2017 01:54)    PAST MEDICAL & SURGICAL HISTORY:  LBBB (left bundle branch block); Seizure; H/O: Hypothyroidism; Hypertension; Acute Renal Insufficiency; CHF (Congestive Heart Failure); Diabetes Mellitus; Dyslipidemia; S/P cardiac cath: 1 stent 10 years ago; Diabetes; FH: HTN (hypertension); Acute CHF  No Past Surgical History    MEDICATIONS  (STANDING):  aspirin  chewable 81 milliGRAM(s) Oral daily  atorvastatin 40 milliGRAM(s) Oral at bedtime  clopidogrel Tablet 75 milliGRAM(s) Oral daily  amLODIPine   Tablet 10 milliGRAM(s) Oral daily  hydrALAZINE 50 milliGRAM(s) Oral two times a day  dextrose 5% + sodium chloride 0.45%. 1000 milliLiter(s) (50 mL/Hr) IV Continuous <Continuous>  cefTRIAXone   IVPB 1 Gram(s) IV Intermittent every 24 hours  azithromycin   Tablet 500 milliGRAM(s) Oral daily  insulin lispro (HumaLOG) corrective regimen sliding scale   SubCutaneous three times a day before meals  insulin lispro (HumaLOG) corrective regimen sliding scale   SubCutaneous at bedtime  dextrose 5%. 1000 milliLiter(s) (50 mL/Hr) IV Continuous <Continuous>  dextrose 50% Injectable 12.5 Gram(s) IV Push once  dextrose 50% Injectable 25 Gram(s) IV Push once  dextrose 50% Injectable 25 Gram(s) IV Push once  levETIRAcetam 500 milliGRAM(s) Oral two times a day    MEDICATIONS  (PRN):  ALBUTerol/ipratropium for Nebulization 3 milliLiter(s) Nebulizer every 6 hours PRN Shortness of Breath and/or Wheezing  acetaminophen   Tablet 650 milliGRAM(s) Oral every 6 hours PRN For Temp greater than 38 C (100.4 F)  guaiFENesin    Syrup 200 milliGRAM(s) Oral every 6 hours PRN Cough  dextrose Gel 1 Dose(s) Oral once PRN Blood Glucose LESS THAN 70 milliGRAM(s)/deciliter  glucagon  Injectable 1 milliGRAM(s) IntraMuscular once PRN Glucose LESS THAN 70 milligrams/deciliter    Allergies: No Known Allergies  Intolerances  FAMILY HISTORY:  No pertinent family history in first degree relatives    Vital Signs Last 24 Hrs  T(C): 37.1 (2017 11:00), Max: 37.1 (2017 11:00)  T(F): 98.8 (2017 11:00), Max: 98.8 (2017 11:00)  HR: 84 (2017 11:00) (84 - 117)  BP: 132/60 (2017 11:00) (122/91 - 155/84)  BP(mean): --  RR: 18 (2017 11:00) (16 - 20)  SpO2: 98% (2017 11:00) (97% - 98%)    NEUROLOGICAL EXAM:  HENT:  Normocephalic head; atraumatic head.  Neck supple.  ENT: normal looking.  Mental State:    Awake; poorly cooperative.  in no acute distress;     Cranial Nerves:  II-XII:   Pupils round and reactive to light.   Facial symmetry intact.  Tongue midline.  Motor Functions:  Moves all extremities.      Sensory Functions:   Intact to touch and pinprick to face and extremities.    Reflexes:  Deep tendon reflexes normoactive to knees and ankles.  Babinski absent   Cerebellar Testing:    Unable to cooperate.  Neurovascular: Carotid auscultation full without bruits.      LABS:                        10.1   11.3  )-----------( 144      ( 2017 08:55 )             32.2     07-    139  |  101  |  42<H>  ----------------------------<  161<H>  3.7   |  28  |  5.27<H>    Ca    9.8      2017 08:55    TPro  8.1  /  Alb  3.2<L>  /  TBili  0.3  /  DBili  x   /  AST  19  /  ALT  19  /  AlkPhos  134<H>      Urinalysis Basic - ( 2017 01:25 )    Color: Yellow / Appearance: Clear / S.015 / pH: x  Gluc: x / Ketone: Negative  / Bili: Negative / Urobili: Negative mg/dL   Blood: x / Protein: 100 mg/dL / Nitrite: Negative   Leuk Esterase: Negative / RBC: x / WBC 0-2   Sq Epi: x / Non Sq Epi: x / Bacteria: x        RADIOLOGY & ADDITIONAL STUDIES:    CT Head No Cont: Urgent   Indication: seizure  Transport: Stretcher-Crib  Exam Completed  Provider's Contact #: [phone] ( @ 22:50)  Complete Blood Count + Automated Diff: STAT ( @ 22:50)  Comprehensive Metabolic Panel: STAT ( @ 22:50)  CKMB Mass Assay: STAT ( @ 22:50)  Troponin I, Serum: STAT ( @ 22:50)  Lipase, Serum: STAT ( @ 22:50)  Serum Pro-Brain Natriuretic Peptide: STAT ( @ 22:50)  Culture - Urine: Routine  Specimen Source: Catheterized ( @ 22:50)  Urinalysis: STAT ( @ 22:50)  Straight Cath for Residual Urine:     Time/Priority:  STAT ( @ 22:50)  sodium chloride 0.9% Bolus:   500 milliLiter(s), IV Bolus, once, infuse over 1 Hour(s), Stop After 1 Doses  Provider's Contact #: [phone] ( @ 22:52)  LORazepam    IVPB: [Ordered as ATIVAN IVPB]  1 milliGRAM(s) in dextrose 5% 50 milliLiter(s), IV Intermittent, Once, infuse over 15 Minute(s), Stop After 1 Doses  Administration Instructions: This is a Look-alike/Sound-alike Medication  Provider's Contact #: [phone] ( @ 23:07)  levETIRAcetam  IVPB: [Ordered as KEPPRA IVPB]  1000 milliGRAM(s) in IV Solution 100 milliLiter(s), IV Intermittent, once, infuse over 15 Minute(s), Stop After 1 Doses  Administration Instructions: This is a Look-alike/Sound-alike Medication  Provider's Contact #: [phone] ( @ 23:07)  (Floorstock):   Qty Removed: 1 each ( @ 23:09)  LORazepam   Injectable: [Known as ATIVAN Injectable]  1 milliGRAM(s), IV Push, Once, Stop After 1 Doses  Administration Instructions: This is a Look-alike/Sound-alike Medication  Provider's Contact #: [phone] ( @ 23:21)  Xray Chest 1 View AP/PA.: Urgent   Indication: seizure  Transport: Stretcher-Crib  Exam Completed  Provider's Contact #: [phone] ( @ 00:28)  Culture - Blood: Routine  Specimen Source: Blood ( @ 00:47)  Culture - Blood: Routine  Specimen Source: Blood ( @ 00:47)  vancomycin  IVPB:   1000 milliGRAM(s) in dextrose 5% 250 milliLiter(s), IV Intermittent, once, infuse over 60 Minute(s), Stop After 1 Doses  Indication: pna  Provider's Contact #: [phone] ( @ 00:47)  piperacillin/tazobactam IVPB.: [Known as ZOSYN IVPB.]  3.375 Gram(s) in dextrose 5% 100 milliLiter(s), IV Intermittent, once, infuse over 30 Minute(s), Stop After 1 Doses  Indication: pna ( @ 00:47)  Admit to Inpatient Level of Care:     Service:  TELEMETRY    Physician:  Qasim Keyes    Additional Instructions:  Diagnosis: Pneumonia of right lower lobe due to infectious organism; Seizure  Isolation: None  Special Consideration: None ( @ 01:07)  Admit from ED ( @ 01:21)  Urine Microscopic-Add On (NC): 01:15 ( @ 01:27)  (ADM OVERRIDE):   Qty Removed: 1 each  Route - Dose Given <see task> ( @ 01:27)  (ADM OVERRIDE):   Qty Removed: 1 each  Route - Dose Given <see task> ( @ 01:27)  Admit to Inpatient Level of Care:     Service:  Telemetry    Physician:  Jean Carlos Keyes (:50)  Remote Telemetry/EKG EXCEPT Off Unit Tests:     Time/Priority:  Routine ( @ 01:50)  Diet, NPO (:50)  Fall Risk Protocol:     Time/Priority:  Routine ( @ 01:50)  Aspiration Precautions:     Time/Priority:  Routine ( @ :50)  Seizure Precautions:     Time/Priority:  Routine (:50)  Vital Signs:     Frequency:  per routine (:50)  Assess Neurological Status:     Type of Neuro Check:  General    Frequency:  every 2 hours ( @ 01:50)  heparin  Injectable:   5000 Unit(s), SubCutaneous, every 8 hours  Provider's Contact #: [phone] ( @ 01:50)  LORazepam   Injectable: [Ordered as ATIVAN Injectable]  2 milliGRAM(s), IntraMuscular, every 6 hours, PRN for Seizures  Administration Instructions: This is a Look-alike/Sound-alike Medication  Provider's Contact #: [phone] ( @ 01:51)  aspirin  chewable:   81 milliGRAM(s), Oral, daily  Provider's Contact #: [phone] ( @ 01:52)  atorvastatin: [Known as LIPITOR]  40 milliGRAM(s), Oral, at bedtime  Provider's Contact #: [phone] ( @ 01:52)  clopidogrel Tablet: [Known as PLAVIX]  75 milliGRAM(s), Oral, daily  Provider's Contact #: [phone] ( @ 01:52)  amLODIPine   Tablet: [Known as NORVASC]  10 milliGRAM(s), Oral, daily  Administration Instructions: This is a Look-alike/Sound-alike Medication  Provider's Contact #: [phone] ( @ 01:52)  hydrALAZINE: [Known as APRESOLINE]  50 milliGRAM(s), Oral, two times a day  Administration Instructions: This is a Look-alike/Sound-alike Medication  Provider's Contact #: [phone] ( @ 01:52)  dextrose 5% + sodium chloride 0.45%.: Solution, 1000 milliLiter(s) infuse at 75 mL/Hr, Stop After 24 Hours  Provider's Contact #: [phone] ( @ 01:54)  dextrose 5% + sodium chloride 0.45%.: Solution, 1000 milliLiter(s) infuse at 60 mL/Hr, Stop After 24 Hours  Provider's Contact #: [phone] ( @ 02:49)  dextrose 5% + sodium chloride 0.45%.: Solution, 1000 milliLiter(s) infuse at 50 mL/Hr, Stop After 24 Hours  Provider's Contact #: [phone] ( @ 02:51)  EEG Awake or Drowsy:   Transport: Stretcher-Crib  Hemorrhage:No  Brain Death: No  MI:No  Sickle Cell:No   Stimulants:No  Anti-Convulsants:Yes   Anti-Depressants:No  Contr Substances:No   Ordering Provider's Pager/Contact #:[phone] ( @ 02:58)  levETIRAcetam: [Ordered as KEPPRA]  500 milliGRAM(s), Oral, two times a day  Administration Instructions: This is a Look-alike/Sound-alike Medication  Provider's Contact #: [phone] ( @ 02:58)  metoclopramide Injectable: [Ordered as REGLAN Injectable]  10 milliGRAM(s), IV Push, every 8 hours, PRN for nausea and vomiting  Administration Instructions: Max IV Push dose 10 milliGRAM(s)  IF IV PUSH, ADMINISTER OVER 2 MIN  Provider's Contact #: [phone] ( @ 02:58)  cefTRIAXone   IVPB: [Ordered as ROCEPHIN  IVPB]  1 Gram(s) in dextrose 5% 50 milliLiter(s), IV Intermittent, every 24 hours, infuse over 30 Minute(s)  Indication: pneumonia  Administration Instructions: This is a Look-alike/Sound-alike Medication  Provider's Contact #: [phone] ( @ 02:58)  azithromycin   Tablet: [Ordered as ZITHROMAX]  500 milliGRAM(s), Oral, daily  Indication: pneumonia  Provider's Contact #: [phone] ( 02:58)  Oxygen Delivery Therapy:   Oxygen Method: Nasal Cannula  LPM: 2 (:58)  Culture - Sputum: Routine  Specimen Source: Sputum (:58)  ALBUTerol/ipratropium for Nebulization: Known as DUONEB  3 milliLiter(s), Nebulizer, every 6 hours, PRN for Shortness of Breath and/or Wheezing  Special Instructions: Continue small volume nebulizer treatment until respiratory assessment and patient education determines treatment can be converted to MDI per Pharmacy [facility] The[facility] protocol. Discontinue nebulizer order once patient has converted to MDI.  Below is the criteria that must be met to make the conversion:  1.Patient is cooperative, alert, oriented & follows instructions  2.Patient has sufficient pre-bronchodilator inspiratory flow to be able to take a slow, deep inspiration and hold their breath f  Administration Instructions: Contains: 2.5 mG albuterol and 0.5 mG ipratropium  This is a Look-alike/Sound-alike Medication  Provider's Contact #: [phone] ( 02:58)  Complete Blood Count: 08:00 ( @ :58)  Basic Metabolic Panel: 08:00 ( @ 02:58)  acetaminophen   Tablet: [Ordered as TYLENOL]  650 milliGRAM(s), Oral, every 6 hours, PRN for For Temp greater than 38 C (100.4 F)  Administration Instructions: MAX DAILY DOSE:  ADULT = 4,000 mG/Day  Provider's Contact #: [phone] ( 02:58)  guaiFENesin    Syrup: [Ordered as ROBITUSSIN]  200 milliGRAM(s), Oral, every 6 hours, PRN for Cough  Administration Instructions: This is a Look-alike/Sound-alike Medication  Provider's Contact #: [phone] ( 02:58)  Blood Glucose Point Of Care Testing:     Frequency:  every 6 hours    Additional Instructions:  If NPO (:58)  Blood Glucose Point Of Care Testing:     Frequency:  every 15 minutes    Additional Instructions:  After carbohydrate administration for hypoglycemia, repeat every 15 minute(s) until blood glucose is GREATER THAN or EQUAL  milliGRAM(s)/deciLiter twice consecutively ( 02:58)  Notify Provider For:     Additional Instructions:  Blood glucose LESS THAN 70 milliGRAM(s)/deciLiter or GREATER THAN 400 milliGRAM(s)/deciLiter ( @ 02:58)  Education:     Diabetes    Other: Diet, Exercise    Additional Instructions: Diet, Exercise, Diabetes ( @ 02:58)  insulin lispro (HumaLOG) corrective regimen sliding scale:       2 Unit(s) if Glucose 151 - 200      4 Unit(s) if Glucose 201 - 250      6 Unit(s) if Glucose 251 - 300      8 Unit(s) if Glucose 301 - 350      10 Unit(s) if Glucose 351 - 400      12 Unit(s) if Glucose Greater Than 400 + Contact MD  SubCutaneous, three times a day before meals  Special Instructions: Give correctional scale insulin REGARDLESS of PO status NOTIFY Provider for blood glucose LESS THAN 70 milliGRAM(s)/deciLiter or above 400 milliGRAM(s)/deciLiter.  Administration Instructions: *Per Sliding Scale*  Dispose unused medication in BLACK bin.  This is a Look-alike/Sound-alike Medication  Provider's Contact #: [phone] ( @ 02:58)  insulin lispro (HumaLOG) corrective regimen sliding scale:       0 Unit(s) if Glucose 0 - 250      1 Unit(s) if Glucose 251 - 300      2 Unit(s) if Glucose 301 - 350      3 Unit(s) if Glucose 351 - 400      4 Unit(s) if Glucose Greater Than 400 + Contact Provider  SubCutaneous, at bedtime  Special Instructions: Give correctional scale insulin REGARDLESS of PO status NOTIFY Provider for blood glucose LESS THAN 70 milliGRAM(s)/deciLiter or above 400 milliGRAM(s)/deciLiter.  Administration Instructions: Dispose unused medication in BLACK bin.  This is a Look-alike/Sound-alike Medication  Provider's Contact #: [phone] ( @ 02:58)  dextrose 5%.: Solution, 1000 milliLiter(s) infuse at 50 mL/Hr  Special Instructions: Conditional Order: HYPOGLYCEMIA PROTOCOL  Provider's Contact #: [phone] ( @ 02:58)  Administer Carbohydrates:     Additional Instructions:  HYPOGLYCEMIA PROTOCOL ( @ 02:58)  dextrose Gel: [Known as GLUTOSE]  1 Dose(s), Oral, once, PRN for Blood Glucose LESS THAN 70 milliGRAM(s)/deciliter, Stop After 1 Doses  Special Instructions: Conditional Order: HYPOGLYCEMIA PROTOCOL  Provider's Contact #: [phone] ( @ 02:58)  dextrose 50% Injectable:   12.5 Gram(s), IV Push, once, Stop After 1 Doses  Special Instructions: Conditional Order: HYPOGLYCEMIA PROTOCOL  Provider's Contact #: [phone] ( @ 02:58)  dextrose 50% Injectable:   25 Gram(s), IV Push, once, Stop After 1 Doses  Special Instructions: Conditional Order: HYPOGLYCEMIA PROTOCOL  Provider's Contact #: [phone] ( @ 02:58)  dextrose 50% Injectable:   25 Gram(s), IV Push, once, Stop After 1 Doses  Special Instructions: Conditional Order: HYPOGLYCEMIA PROTOCOL  Provider's Contact #: [phone] ( @ 02:58)  glucagon  Injectable:   1 milliGRAM(s), IntraMuscular, once, PRN for Glucose LESS THAN 70 milligrams/deciliter, Stop After 1 Doses  Special Instructions: Conditional Order: HYPOGLYCEMIA PROTOCOL  Provider's Contact #: [phone] ( @ 02:58)  Provider to RN:       UNRESPONSIVE patient and Blood Glucose LESS THAN 70 milliGRAM(s)/deciLiter call Rapid Response.  HYPOGLYCEMIA PROTOCOL ( @ 02:58)  Consult- PT Evaluate and Treat:   *Reason for Consult - Must select at least one choice*     Neuro Event  Weight Bearing Restrictions: No  Additional Information: S/p seizure ( @ 02:58)  12 Lead ECG ( @ 06:21)  levETIRAcetam  IVPB: [Ordered as KEPPRA IVPB]  500 milliGRAM(s) in IV Solution 100 milliLiter(s), IV Intermittent, every 12 hours, infuse over 15 Minute(s)  Administration Instructions: This is a Look-alike/Sound-alike Medication  Provider's Contact #: [phone] ( @ 08:32)  CT Chest No Cont: Routine   Indication: abnl cxr  Transport: Stretcher-Crib  Provider's Contact #: [phone] ( @ 10:14)  Complete Blood Count: AM Sched. Collection: 2017 05:00 ( @ 10:14)  Basic Metabolic Panel: AM Sched. Collection: 2017 05:00 ( @ 10:14)  Compression Device Sequential:     Body Side:  Bilateral ( @ 10:16)  levETIRAcetam: [Ordered as KEPPRA]  500 milliGRAM(s), Oral, two times a day  Administration Instructions: This is a Look-alike/Sound-alike Medication  Provider's Contact #: [phone] ( @ 11:31)  Diet, Regular:   Consistent Carbohydrate Evening Snack  DASH/TLC Sodium & Cholesterol Restricted ( @ 12:10)  Hemodialysis Treatment:     Dialyzer:  Revaclear 300    Blood Flow:  400    Hours Of Treatment:  3.5 hour(s)    Dialysate(mL/minute):  600    Dialysate Bath Potassium: 2K+    Dialysate Bath Calcium: 2.5mEq/L    Primer: Without Heparin    Access: Arterio Venous Fistula    Desired Weight Loss/E.D.W.:  2 kg    Maintain Systolic Blood Pressure >(mmHg):100    Additional Instructions: [place] [facility] ( @ 12:13)    Assessment & Opinion:   Seizure Disorder, generalized.  Pneumonia.  ESRD.  Recommendations:  Adjust her anticonvulsant medications.  EEG.   DVT prophylaxis as ordered.  Continue all current medications.

## 2017-07-28 NOTE — ED PROVIDER NOTE - OBJECTIVE STATEMENT
Pt is a 87 yo lady with a pmhx of HTN, HL, IDDM2, hypothyroidism, CAD sp stent, ESRD on HD MWF who presents to the ED with seizures. She has had a cough for the last several days, no fever according to son. Still makes urine, no dysuria, no abdominal pain, no chest pain. Today about 1 hr rior to arrival had a seizure for about 1 minutes, GTC, and post ictal, now recovering. Is not on AED, has been weaned off them, last seizure 1 year ago.

## 2017-07-28 NOTE — H&P ADULT - PROBLEM SELECTOR PROBLEM 4
Primary hypertension Type 2 diabetes mellitus with chronic kidney disease on chronic dialysis, with long-term current use of insulin

## 2017-07-28 NOTE — H&P ADULT - HISTORY OF PRESENT ILLNESS
86 year old woman with PMH of HTN, DM-2, Seizure d/o (was on Keppra in the past, DCed after 2 Seizure-free years), ESRD on HD MWF brought in by family for Seizure episode at home.  Family is not available a the time of encounter, patient lethargic s/p Seizure and antiepileptics given in ED so information obtained from ED and from chart.  4 limb shaking was witnessed by daughter today at home.  As per son, patient has had a bad cough for the last several days.  No other history is available.    In the ED, CT head shows no acute changes.  BNP 5050, troponin mildly elevated at 0.065.  Kidney function at baseline.  UA neg for UTI.  CXR shows LLL opacity, official read pending.

## 2017-07-28 NOTE — H&P ADULT - PROBLEM SELECTOR PLAN 3
Sliding scale while NPO  Start Lantus 5 units QHS when eating  DC D5 when eating Due for HD today  Renal consult for HD

## 2017-07-28 NOTE — H&P ADULT - PROBLEM SELECTOR PLAN 2
Given 1 dose of Vanco + Zosyn in ED  Will start Rocephin and Zithro, follow official CXR and blood Cx  O2 via NC@ 2L/Min, keep sat >94% at all times.  Sputum cx  Albuterol/Atrovent 1 unit neb Q6hrs PRN  Monitor CBC Qdaily.  Tylenol 650mg po q6hrs PRN for fever.  Robitussin 10ml PO Q8hrs PRN for cough.

## 2017-07-28 NOTE — ED PROVIDER NOTE - CARE PLAN
Principal Discharge DX:	Pneumonia of right lower lobe due to infectious organism  Secondary Diagnosis:	Seizure

## 2017-07-28 NOTE — H&P ADULT - NSHPLABSRESULTS_GEN_ALL_CORE
Vital Signs Last 24 Hrs  T(C): 36.6 (2017 22:28), Max: 36.6 (2017 22:28)  T(F): 97.8 (2017 22:28), Max: 97.8 (2017 22:28)  HR: 116 (2017 23:36) (116 - 117)  BP: 140/51 (2017 23:36) (122/91 - 140/51)  BP(mean): --  RR: 20 (2017 23:36) (19 - 20)  SpO2: 97% (2017 23:36) (97% - 98%)        LABS:                        11.3   14.7  )-----------( 217      ( 2017 22:58 )             35.7         137  |  97  |  40<H>  ----------------------------<  258<H>  4.2   |  27  |  5.12<H>    Ca    10.1      2017 22:58    TPro  8.1  /  Alb  3.2<L>  /  TBili  0.3  /  DBili  x   /  AST  19  /  ALT  19  /  AlkPhos  134<H>  07-27      Urinalysis Basic - ( 2017 01:25 )    Color: Yellow / Appearance: Clear / S.015 / pH: x  Gluc: x / Ketone: Negative  / Bili: Negative / Urobili: Negative mg/dL   Blood: x / Protein: 100 mg/dL / Nitrite: Negative   Leuk Esterase: Negative / RBC: x / WBC 0-2   Sq Epi: x / Non Sq Epi: x / Bacteria: x        RADIOLOGY & ADDITIONAL STUDIES:    CT head without contrast:  IMPRESSION:  No hemorrhage or mass effect. 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  If there are new or persistent symptoms, consider further evaluation with   brain MRI if clinically warranted and if there are no contraindications.

## 2017-07-28 NOTE — ED ADULT NURSE REASSESSMENT NOTE - NS ED NURSE REASSESS COMMENT FT1
Patient difficult stick: Unable to obtain both bottles for blood cultures only aerobic bottles sent.

## 2017-07-29 LAB
ANION GAP SERPL CALC-SCNC: 8 MMOL/L — SIGNIFICANT CHANGE UP (ref 5–17)
BUN SERPL-MCNC: 18 MG/DL — SIGNIFICANT CHANGE UP (ref 7–23)
CALCIUM SERPL-MCNC: 9.3 MG/DL — SIGNIFICANT CHANGE UP (ref 8.5–10.1)
CHLORIDE SERPL-SCNC: 105 MMOL/L — SIGNIFICANT CHANGE UP (ref 96–108)
CO2 SERPL-SCNC: 29 MMOL/L — SIGNIFICANT CHANGE UP (ref 22–31)
CREAT SERPL-MCNC: 3.23 MG/DL — HIGH (ref 0.5–1.3)
CULTURE RESULTS: NO GROWTH — SIGNIFICANT CHANGE UP
GLUCOSE SERPL-MCNC: 116 MG/DL — HIGH (ref 70–99)
HCT VFR BLD CALC: 36.6 % — SIGNIFICANT CHANGE UP (ref 34.5–45)
HGB BLD-MCNC: 11.4 G/DL — LOW (ref 11.5–15.5)
MCHC RBC-ENTMCNC: 28.7 PG — SIGNIFICANT CHANGE UP (ref 27–34)
MCHC RBC-ENTMCNC: 31.3 GM/DL — LOW (ref 32–36)
MCV RBC AUTO: 91.9 FL — SIGNIFICANT CHANGE UP (ref 80–100)
PLATELET # BLD AUTO: 216 K/UL — SIGNIFICANT CHANGE UP (ref 150–400)
POTASSIUM SERPL-MCNC: 4.2 MMOL/L — SIGNIFICANT CHANGE UP (ref 3.5–5.3)
POTASSIUM SERPL-SCNC: 4.2 MMOL/L — SIGNIFICANT CHANGE UP (ref 3.5–5.3)
RBC # BLD: 3.98 M/UL — SIGNIFICANT CHANGE UP (ref 3.8–5.2)
RBC # FLD: 17.8 % — HIGH (ref 11–15)
SODIUM SERPL-SCNC: 142 MMOL/L — SIGNIFICANT CHANGE UP (ref 135–145)
SPECIMEN SOURCE: SIGNIFICANT CHANGE UP
WBC # BLD: 9.8 K/UL — SIGNIFICANT CHANGE UP (ref 3.8–10.5)
WBC # FLD AUTO: 9.8 K/UL — SIGNIFICANT CHANGE UP (ref 3.8–10.5)

## 2017-07-29 PROCEDURE — 99233 SBSQ HOSP IP/OBS HIGH 50: CPT

## 2017-07-29 PROCEDURE — 71250 CT THORAX DX C-: CPT | Mod: 26

## 2017-07-29 RX ORDER — HEPARIN SODIUM 5000 [USP'U]/ML
5000 INJECTION INTRAVENOUS; SUBCUTANEOUS EVERY 12 HOURS
Qty: 0 | Refills: 0 | Status: DISCONTINUED | OUTPATIENT
Start: 2017-07-29 | End: 2017-07-31

## 2017-07-29 RX ADMIN — AMLODIPINE BESYLATE 10 MILLIGRAM(S): 2.5 TABLET ORAL at 06:30

## 2017-07-29 RX ADMIN — CEFTRIAXONE 100 GRAM(S): 500 INJECTION, POWDER, FOR SOLUTION INTRAMUSCULAR; INTRAVENOUS at 11:27

## 2017-07-29 RX ADMIN — HEPARIN SODIUM 5000 UNIT(S): 5000 INJECTION INTRAVENOUS; SUBCUTANEOUS at 17:47

## 2017-07-29 RX ADMIN — AZITHROMYCIN 500 MILLIGRAM(S): 500 TABLET, FILM COATED ORAL at 13:05

## 2017-07-29 RX ADMIN — Medication 50 MILLIGRAM(S): at 17:47

## 2017-07-29 RX ADMIN — Medication 50 MILLIGRAM(S): at 06:28

## 2017-07-29 RX ADMIN — CLOPIDOGREL BISULFATE 75 MILLIGRAM(S): 75 TABLET, FILM COATED ORAL at 11:28

## 2017-07-29 RX ADMIN — LEVETIRACETAM 500 MILLIGRAM(S): 250 TABLET, FILM COATED ORAL at 06:28

## 2017-07-29 RX ADMIN — LEVETIRACETAM 500 MILLIGRAM(S): 250 TABLET, FILM COATED ORAL at 17:47

## 2017-07-29 RX ADMIN — Medication 1: at 22:08

## 2017-07-29 RX ADMIN — Medication 81 MILLIGRAM(S): at 11:28

## 2017-07-29 RX ADMIN — ATORVASTATIN CALCIUM 40 MILLIGRAM(S): 80 TABLET, FILM COATED ORAL at 21:51

## 2017-07-29 NOTE — PROGRESS NOTE ADULT - PROBLEM SELECTOR PLAN 2
Given 1 dose of Vanco + Zosyn in ED  on tRocephin and Zithro,  cat scan only identifies plueral effusion will continue for at least 24 hours   O2 via NC@ 2L/Min, keep sat >94% at all times.  Sputum cx  Albuterol/Atrovent 1 unit neb Q6hrs PRN  Monitor CBC Qdaily.  Tylenol 650mg po q6hrs PRN for fever.  Robitussin 10ml PO Q8hrs PRN for cough.

## 2017-07-29 NOTE — PHYSICAL THERAPY INITIAL EVALUATION ADULT - PERTINENT HX OF CURRENT PROBLEM, REHAB EVAL
Per H&P, Pt is an 86 year old woman with PMH of HTN, DM-2, Seizure (was on Keppra in the past, DCed after 2 Seizure-free years), ESRD on HD MWF brought in by family for Seizure episode at home.

## 2017-07-29 NOTE — PROGRESS NOTE ADULT - PROBLEM SELECTOR PLAN 1
Neurochecks q2h	  Strict Aspiration, Fall & Seizure precautions.   CT head was negative for any acute intracranial event  Neurology consult appreciated   EEG  Will re-start Keppra 500 mg PO BID  Ativan 2mg IVP Q6hrs prn for breakthrough seizures.  Reglan PRN  for nausea/vomiting

## 2017-07-29 NOTE — PHYSICAL THERAPY INITIAL EVALUATION ADULT - MODIFIED CLINICAL TEST OF SENSORY INTEGRATION IN BALANCE TEST
Barthel Index: Feeding Score __5_, Bathing Score __0_, Grooming Score _0__, Dressing Score __0_, Bowels Score _10__, Bladder Score _10__, Toilet Score _5__, Transfers Score _5__, Mobility Score _0__, Stairs Score __0_,     Total Score __25_

## 2017-07-29 NOTE — PHYSICAL THERAPY INITIAL EVALUATION ADULT - IMPAIRMENTS FOUND, PT EVAL
arousal, attention, and cognition/aerobic capacity/endurance/gait, locomotion, and balance/muscle strength/ergonomics and body mechanics

## 2017-07-29 NOTE — PROGRESS NOTE ADULT - ASSESSMENT
86 year old woman with PMH of HTN, DM-2, Seizure d/o (was on Keppra in the past, DCed after 2 Seizure-free years), ESRD on HD MWF brought in by family for Seizure episode at home, work up also consistent with Pneumonia; patient will require admission for at least 2 midnights plan for discharge monday after dialysis if all tests negative

## 2017-07-29 NOTE — PHYSICAL THERAPY INITIAL EVALUATION ADULT - ADDITIONAL COMMENTS
Pt resides with her son (whom works full time) in a private single story home, 3 entry steps (+ rail). Prior to admission pt required assistance for all functional mobility including household ambulation with rolling walker. Pt has wheelchair for community integration. Pt has home health aide 12 hours/day on weekdays & 8 hours/day on weekends.

## 2017-07-30 LAB
ALBUMIN SERPL ELPH-MCNC: 2.8 G/DL — LOW (ref 3.3–5)
ALP SERPL-CCNC: 78 U/L — SIGNIFICANT CHANGE UP (ref 40–120)
ALT FLD-CCNC: 19 U/L — SIGNIFICANT CHANGE UP (ref 12–78)
ANION GAP SERPL CALC-SCNC: 11 MMOL/L — SIGNIFICANT CHANGE UP (ref 5–17)
AST SERPL-CCNC: 21 U/L — SIGNIFICANT CHANGE UP (ref 15–37)
BILIRUB SERPL-MCNC: 0.5 MG/DL — SIGNIFICANT CHANGE UP (ref 0.2–1.2)
BUN SERPL-MCNC: 45 MG/DL — HIGH (ref 7–23)
CALCIUM SERPL-MCNC: 9.4 MG/DL — SIGNIFICANT CHANGE UP (ref 8.5–10.1)
CHLORIDE SERPL-SCNC: 101 MMOL/L — SIGNIFICANT CHANGE UP (ref 96–108)
CO2 SERPL-SCNC: 25 MMOL/L — SIGNIFICANT CHANGE UP (ref 22–31)
CREAT SERPL-MCNC: 5.17 MG/DL — HIGH (ref 0.5–1.3)
GLUCOSE SERPL-MCNC: 144 MG/DL — HIGH (ref 70–99)
HCT VFR BLD CALC: 35.2 % — SIGNIFICANT CHANGE UP (ref 34.5–45)
HGB BLD-MCNC: 11.3 G/DL — LOW (ref 11.5–15.5)
MCHC RBC-ENTMCNC: 29 PG — SIGNIFICANT CHANGE UP (ref 27–34)
MCHC RBC-ENTMCNC: 32 GM/DL — SIGNIFICANT CHANGE UP (ref 32–36)
MCV RBC AUTO: 90.5 FL — SIGNIFICANT CHANGE UP (ref 80–100)
PLATELET # BLD AUTO: 222 K/UL — SIGNIFICANT CHANGE UP (ref 150–400)
POTASSIUM SERPL-MCNC: 4.8 MMOL/L — SIGNIFICANT CHANGE UP (ref 3.5–5.3)
POTASSIUM SERPL-SCNC: 4.8 MMOL/L — SIGNIFICANT CHANGE UP (ref 3.5–5.3)
PROT SERPL-MCNC: 7.2 GM/DL — SIGNIFICANT CHANGE UP (ref 6–8.3)
RBC # BLD: 3.89 M/UL — SIGNIFICANT CHANGE UP (ref 3.8–5.2)
RBC # FLD: 17.1 % — HIGH (ref 11–15)
SODIUM SERPL-SCNC: 137 MMOL/L — SIGNIFICANT CHANGE UP (ref 135–145)
WBC # BLD: 9.8 K/UL — SIGNIFICANT CHANGE UP (ref 3.8–10.5)
WBC # FLD AUTO: 9.8 K/UL — SIGNIFICANT CHANGE UP (ref 3.8–10.5)

## 2017-07-30 PROCEDURE — 99233 SBSQ HOSP IP/OBS HIGH 50: CPT

## 2017-07-30 RX ADMIN — HEPARIN SODIUM 5000 UNIT(S): 5000 INJECTION INTRAVENOUS; SUBCUTANEOUS at 05:07

## 2017-07-30 RX ADMIN — ATORVASTATIN CALCIUM 40 MILLIGRAM(S): 80 TABLET, FILM COATED ORAL at 21:53

## 2017-07-30 RX ADMIN — LEVETIRACETAM 500 MILLIGRAM(S): 250 TABLET, FILM COATED ORAL at 18:21

## 2017-07-30 RX ADMIN — Medication 200 MILLIGRAM(S): at 18:21

## 2017-07-30 RX ADMIN — AZITHROMYCIN 500 MILLIGRAM(S): 500 TABLET, FILM COATED ORAL at 11:34

## 2017-07-30 RX ADMIN — CLOPIDOGREL BISULFATE 75 MILLIGRAM(S): 75 TABLET, FILM COATED ORAL at 11:33

## 2017-07-30 RX ADMIN — Medication 200 MILLIGRAM(S): at 00:37

## 2017-07-30 RX ADMIN — Medication 50 MILLIGRAM(S): at 17:33

## 2017-07-30 RX ADMIN — Medication 81 MILLIGRAM(S): at 11:34

## 2017-07-30 RX ADMIN — CEFTRIAXONE 100 GRAM(S): 500 INJECTION, POWDER, FOR SOLUTION INTRAMUSCULAR; INTRAVENOUS at 10:55

## 2017-07-30 RX ADMIN — HEPARIN SODIUM 5000 UNIT(S): 5000 INJECTION INTRAVENOUS; SUBCUTANEOUS at 18:21

## 2017-07-30 RX ADMIN — Medication 2: at 17:31

## 2017-07-30 RX ADMIN — LEVETIRACETAM 500 MILLIGRAM(S): 250 TABLET, FILM COATED ORAL at 05:06

## 2017-07-30 RX ADMIN — Medication 50 MILLIGRAM(S): at 05:06

## 2017-07-30 RX ADMIN — AMLODIPINE BESYLATE 10 MILLIGRAM(S): 2.5 TABLET ORAL at 05:06

## 2017-07-30 NOTE — PROGRESS NOTE ADULT - ASSESSMENT
86 year old woman with PMH of HTN, DM-2, Seizure d/o (was on Keppra in the past, DCed after 2 Seizure-free years), ESRD on HD MWF brought in by family for Seizure episode at home, work up also consistent with Pneumonia.    plan for discharge Monday after dialysis if all tests negative

## 2017-07-30 NOTE — PROGRESS NOTE ADULT - PROBLEM SELECTOR PLAN 2
Given 1 dose of Vanco + Zosyn in ED  on Rocephin and Zithro,  cat scan only identifies plueral effusion will continue will follow procalcitonin   O2 via NC@ 2L/Min, keep sat >94% at all times.  Sputum cx  Albuterol/Atrovent 1 unit neb Q6hrs PRN  Monitor CBC Qdaily.  Tylenol 650mg po q6hrs PRN for fever.  Robitussin 10ml PO Q8hrs PRN for cough.

## 2017-07-31 ENCOUNTER — TRANSCRIPTION ENCOUNTER (OUTPATIENT)
Age: 82
End: 2017-07-31

## 2017-07-31 VITALS
DIASTOLIC BLOOD PRESSURE: 60 MMHG | TEMPERATURE: 99 F | HEART RATE: 98 BPM | RESPIRATION RATE: 18 BRPM | SYSTOLIC BLOOD PRESSURE: 150 MMHG | OXYGEN SATURATION: 97 %

## 2017-07-31 LAB
ALBUMIN SERPL ELPH-MCNC: 3 G/DL — LOW (ref 3.3–5)
ALP SERPL-CCNC: 79 U/L — SIGNIFICANT CHANGE UP (ref 40–120)
ALT FLD-CCNC: 21 U/L — SIGNIFICANT CHANGE UP (ref 12–78)
ANION GAP SERPL CALC-SCNC: 15 MMOL/L — SIGNIFICANT CHANGE UP (ref 5–17)
AST SERPL-CCNC: 18 U/L — SIGNIFICANT CHANGE UP (ref 15–37)
BILIRUB SERPL-MCNC: 0.4 MG/DL — SIGNIFICANT CHANGE UP (ref 0.2–1.2)
BUN SERPL-MCNC: 63 MG/DL — HIGH (ref 7–23)
CALCIUM SERPL-MCNC: 9.5 MG/DL — SIGNIFICANT CHANGE UP (ref 8.5–10.1)
CHLORIDE SERPL-SCNC: 101 MMOL/L — SIGNIFICANT CHANGE UP (ref 96–108)
CO2 SERPL-SCNC: 21 MMOL/L — LOW (ref 22–31)
CREAT SERPL-MCNC: 6.4 MG/DL — HIGH (ref 0.5–1.3)
GLUCOSE SERPL-MCNC: 122 MG/DL — HIGH (ref 70–99)
HCT VFR BLD CALC: 33 % — LOW (ref 34.5–45)
HGB BLD-MCNC: 10.4 G/DL — LOW (ref 11.5–15.5)
MCHC RBC-ENTMCNC: 28.2 PG — SIGNIFICANT CHANGE UP (ref 27–34)
MCHC RBC-ENTMCNC: 31.4 GM/DL — LOW (ref 32–36)
MCV RBC AUTO: 89.7 FL — SIGNIFICANT CHANGE UP (ref 80–100)
PLATELET # BLD AUTO: 233 K/UL — SIGNIFICANT CHANGE UP (ref 150–400)
POTASSIUM SERPL-MCNC: 4.8 MMOL/L — SIGNIFICANT CHANGE UP (ref 3.5–5.3)
POTASSIUM SERPL-SCNC: 4.8 MMOL/L — SIGNIFICANT CHANGE UP (ref 3.5–5.3)
PROCALCITONIN SERPL-MCNC: 0.18 NG/ML — HIGH (ref 0–0.04)
PROT SERPL-MCNC: 7.5 GM/DL — SIGNIFICANT CHANGE UP (ref 6–8.3)
RBC # BLD: 3.68 M/UL — LOW (ref 3.8–5.2)
RBC # FLD: 16.9 % — HIGH (ref 11–15)
SODIUM SERPL-SCNC: 137 MMOL/L — SIGNIFICANT CHANGE UP (ref 135–145)
WBC # BLD: 11.3 K/UL — HIGH (ref 3.8–10.5)
WBC # FLD AUTO: 11.3 K/UL — HIGH (ref 3.8–10.5)

## 2017-07-31 PROCEDURE — 99239 HOSP IP/OBS DSCHRG MGMT >30: CPT

## 2017-07-31 RX ORDER — ACETAMINOPHEN 500 MG
650 TABLET ORAL ONCE
Qty: 0 | Refills: 0 | Status: COMPLETED | OUTPATIENT
Start: 2017-07-31 | End: 2017-07-31

## 2017-07-31 RX ORDER — LEVETIRACETAM 250 MG/1
1 TABLET, FILM COATED ORAL
Qty: 60 | Refills: 0 | OUTPATIENT
Start: 2017-07-31 | End: 2017-08-30

## 2017-07-31 RX ADMIN — LEVETIRACETAM 500 MILLIGRAM(S): 250 TABLET, FILM COATED ORAL at 06:48

## 2017-07-31 RX ADMIN — Medication 650 MILLIGRAM(S): at 18:32

## 2017-07-31 RX ADMIN — Medication 4: at 08:55

## 2017-07-31 RX ADMIN — CLOPIDOGREL BISULFATE 75 MILLIGRAM(S): 75 TABLET, FILM COATED ORAL at 17:17

## 2017-07-31 RX ADMIN — Medication 50 MILLIGRAM(S): at 06:48

## 2017-07-31 RX ADMIN — AZITHROMYCIN 500 MILLIGRAM(S): 500 TABLET, FILM COATED ORAL at 17:16

## 2017-07-31 RX ADMIN — AMLODIPINE BESYLATE 10 MILLIGRAM(S): 2.5 TABLET ORAL at 06:48

## 2017-07-31 RX ADMIN — Medication 81 MILLIGRAM(S): at 17:16

## 2017-07-31 RX ADMIN — HEPARIN SODIUM 5000 UNIT(S): 5000 INJECTION INTRAVENOUS; SUBCUTANEOUS at 06:49

## 2017-07-31 RX ADMIN — HEPARIN SODIUM 5000 UNIT(S): 5000 INJECTION INTRAVENOUS; SUBCUTANEOUS at 17:17

## 2017-07-31 RX ADMIN — Medication 50 MILLIGRAM(S): at 17:17

## 2017-07-31 RX ADMIN — LEVETIRACETAM 500 MILLIGRAM(S): 250 TABLET, FILM COATED ORAL at 17:17

## 2017-07-31 RX ADMIN — CEFTRIAXONE 100 GRAM(S): 500 INJECTION, POWDER, FOR SOLUTION INTRAMUSCULAR; INTRAVENOUS at 10:18

## 2017-07-31 NOTE — DISCHARGE NOTE ADULT - CARE PLAN
Principal Discharge DX:	Seizure  Goal:	c/w keppra and f/u neuro  Instructions for follow-up, activity and diet:	activity as tolerated, renal diet, f/u pcp and neurology  Secondary Diagnosis:	Primary hypertension  Secondary Diagnosis:	Pneumonia of right lower lobe due to infectious organism  Secondary Diagnosis:	Pneumonia of left lower lobe due to infectious organism  Secondary Diagnosis:	ESRD (end stage renal disease) on dialysis  Secondary Diagnosis:	Type 2 diabetes mellitus with chronic kidney disease on chronic dialysis, with long-term current use of insulin

## 2017-07-31 NOTE — DISCHARGE NOTE ADULT - MEDICATION SUMMARY - MEDICATIONS TO TAKE
I will START or STAY ON the medications listed below when I get home from the hospital:    aspirin 81 mg oral tablet  -- 1 tab(s) by mouth once a day  -- Indication: For Preventive measure    levETIRAcetam 500 mg oral tablet  -- 1 tab(s) by mouth 2 times a day  -- Indication: For Seizure    Lantus 100 units/mL subcutaneous solution  -- 12 unit(s) subcutaneous once a day (at bedtime)  -- Indication: For Type 2 diabetes mellitus with chronic kidney disease on chronic dialysis, with long-term current use of insulin    atorvastatin 40 mg oral tablet  -- 1 tab(s) by mouth once a day (at bedtime)  -- Indication: For Type 2 diabetes mellitus with chronic kidney disease on chronic dialysis, with long-term current use of insulin    clopidogrel 75 mg oral tablet  -- 1 tab(s) by mouth once a day  -- Indication: For Preventive measure    amLODIPine 10 mg oral tablet  -- 1 tab(s) by mouth once a day  -- Indication: For Primary hypertension    Levaquin 750 mg oral tablet  -- 1 tab(s) by mouth once a day  -- Avoid prolonged or excessive exposure to direct and/or artificial sunlight while taking this medication.  Do not take dairy products, antacids, or iron preparations within one hour of this medication.  Finish all this medication unless otherwise directed by prescriber.  May cause drowsiness or dizziness.  Medication should be taken with plenty of water.    -- Indication: For Pneumonia of left lower lobe due to infectious organism    hydrALAZINE 50 mg oral tablet  -- 1 tab(s) by mouth 2 times a day  -- Indication: For Primary hypertension

## 2017-07-31 NOTE — DISCHARGE NOTE ADULT - HOSPITAL COURSE
86 year old woman with PMH of HTN, DM-2, Seizure d/o (was on Keppra in the past, DCed after 2 Seizure-free years), ESRD on HD MWF brought in by family for Seizure episode at home, work up also consistent with Pneumonia.        Problem/Plan - 1:  ·  Problem: Seizure.  Plan:   CT head was negative for any acute intracranial event  Neurology consult appreciated   EEG to be done today will f/u as out patient    Keppra 500 mg PO BID      Problem/Plan - 2:  ·  Problem: Pneumonia of left lower lobe due to infectious organism.  Plan: Given 1 dose of Vanco + Zosyn in ED  on Rocephin and Zithro,  cat scan only identifies plueral effusion will continue will follow procalcitonin   O2 via NC@ 2L/Min, keep sat >94% at all times.  will have 3more days of levaquin as out pt, she is asymptomatic    Problem/Plan - 3:  ·  Problem: ESRD (end stage renal disease) on dialysis.  Plan: HD today and d/c    Problem/Plan - 4:  ·  Problem: Type 2 diabetes mellitus with chronic kidney disease on chronic dialysis, with long-term current use of insulin.  Plan: c/w home meds     Problem/Plan - 5:  ·  Problem: Primary hypertension.  Plan: Continue home meds. 86 year old woman with PMH of HTN, DM-2, Seizure d/o (was on Keppra in the past, DCed after 2 Seizure-free years), ESRD on HD MWF brought in by family for Seizure episode at home, work up also consistent with Pneumonia.        Problem/Plan - 1:  ·  Problem: Seizure.  Plan:   CT head was negative for any acute intracranial event  Neurology consult appreciated   EEG to be done as outpatient as pt was not cooperative here will f/u as out patient    Keppra 500 mg PO BID      Problem/Plan - 2:  ·  Problem: Pneumonia of left lower lobe due to infectious organism.  Plan: Given 1 dose of Vanco + Zosyn in ED  on Rocephin and Zithro,  cat scan only identifies plueral effusion will continue will follow procalcitonin   O2 via NC@ 2L/Min, keep sat >94% at all times.  will have 3more days of levaquin as out pt, she is asymptomatic    Problem/Plan - 3:  ·  Problem: ESRD (end stage renal disease) on dialysis.  Plan: HD today and d/c    Problem/Plan - 4:  ·  Problem: Type 2 diabetes mellitus with chronic kidney disease on chronic dialysis, with long-term current use of insulin.  Plan: c/w home meds     Problem/Plan - 5:  ·  Problem: Primary hypertension.  Plan: Continue home meds.     45 minutes spent on total encounter; more than 50% of the visit was spent counseling and/or coordinating care by the attending physician

## 2017-07-31 NOTE — DISCHARGE NOTE ADULT - PATIENT PORTAL LINK FT
“You can access the [employer] Patient Portal, offered by [facility], by registering with the following website: [email or web address]”

## 2017-07-31 NOTE — PROGRESS NOTE ADULT - PROVIDER SPECIALTY LIST ADULT
Hospitalist
Hospitalist
Nephrology
Neurology
Cardiology

## 2017-07-31 NOTE — DISCHARGE NOTE ADULT - CARE PROVIDER_API CALL
your pcp,   Phone: (   )    -  Fax: (   )    -    Juan José Bee (MD), Neurology  [address] [place] [address]  Phone: [phone]  Fax: [phone]

## 2017-07-31 NOTE — DISCHARGE NOTE ADULT - SECONDARY DIAGNOSIS.
Primary hypertension Pneumonia of right lower lobe due to infectious organism Pneumonia of left lower lobe due to infectious organism ESRD (end stage renal disease) on dialysis Type 2 diabetes mellitus with chronic kidney disease on chronic dialysis, with long-term current use of insulin

## 2017-07-31 NOTE — PROGRESS NOTE ADULT - SUBJECTIVE AND OBJECTIVE BOX
Assessment:  Mild troponin elevation in setting of seizure and renal insufficiency  Chronic systolic and diastolic CHF, stable  Continue medical management as written  Recent Echo noted
Assessment:  Mild troponin elevation in setting of seizure and renal insufficiency  Chronic systolic and diastolic CHF, stable  Continue medical management as written  Recent Echo noted  DC plan
Assessment:  Mild troponin elevation in setting of seizure and renal insufficiency  Chronic systolic and diastolic CHF, stable  Continue medical management as written  Recent Echo noted  DC plan for Monday
INTERVAL HPI/OVERNIGHT EVENTS:  Subjective Complaints:  No complaints.  No seizure recurence. Pt sitting up by bedside chair in no acute distress.  AOX3; speech clear.  Exhibits no focal motor sensory deficits.  EEG pending.    RECENT RADIOLOGY & ADDITIONAL TESTS:    NEUROLOGICAL EXAM (Pertinent):  Vital Signs Last 24 Hrs  T(C): 37.1 (2017 11:15), Max: 37.1 (2017 11:15)  T(F): 98.8 (2017 11:15), Max: 98.8 (2017 11:15)  HR: 82 (2017 11:15) (81 - 103)  BP: 144/51 (2017 11:15) (138/48 - 160/68)  BP(mean): --  RR: 18 (2017 11:15) (17 - 19)  SpO2: 96% (2017 11:15) (95% - 99%)    MEDICATIONS  (STANDING):  aspirin  chewable 81 milliGRAM(s) Oral daily  atorvastatin 40 milliGRAM(s) Oral at bedtime  clopidogrel Tablet 75 milliGRAM(s) Oral daily  amLODIPine   Tablet 10 milliGRAM(s) Oral daily  hydrALAZINE 50 milliGRAM(s) Oral two times a day  cefTRIAXone   IVPB 1 Gram(s) IV Intermittent every 24 hours  azithromycin   Tablet 500 milliGRAM(s) Oral daily  insulin lispro (HumaLOG) corrective regimen sliding scale   SubCutaneous three times a day before meals  insulin lispro (HumaLOG) corrective regimen sliding scale   SubCutaneous at bedtime  dextrose 5%. 1000 milliLiter(s) (50 mL/Hr) IV Continuous <Continuous>  dextrose 50% Injectable 12.5 Gram(s) IV Push once  dextrose 50% Injectable 25 Gram(s) IV Push once  dextrose 50% Injectable 25 Gram(s) IV Push once  levETIRAcetam 500 milliGRAM(s) Oral two times a day  heparin  Injectable 5000 Unit(s) SubCutaneous every 12 hours    MEDICATIONS  (PRN):  ALBUTerol/ipratropium for Nebulization 3 milliLiter(s) Nebulizer every 6 hours PRN Shortness of Breath and/or Wheezing  acetaminophen   Tablet 650 milliGRAM(s) Oral every 6 hours PRN For Temp greater than 38 C (100.4 F)  guaiFENesin    Syrup 200 milliGRAM(s) Oral every 6 hours PRN Cough  dextrose Gel 1 Dose(s) Oral once PRN Blood Glucose LESS THAN 70 milliGRAM(s)/deciliter  glucagon  Injectable 1 milliGRAM(s) IntraMuscular once PRN Glucose LESS THAN 70 milligrams/deciliter    LABS:                        11.4   9.8   )-----------( 216      ( 2017 07:26 )             36.6     07-    142  |  105  |  18  ----------------------------<  116<H>  4.2   |  29  |  3.23<H>    Ca    9.3      2017 07:26    TPro  8.1  /  Alb  3.2<L>  /  TBili  0.3  /  DBili  x   /  AST  19  /  ALT  19  /  AlkPhos  134<H>  07-27      Urinalysis Basic - ( 2017 01:25 )    Color: Yellow / Appearance: Clear / S.015 / pH: x  Gluc: x / Ketone: Negative  / Bili: Negative / Urobili: Negative mg/dL   Blood: x / Protein: 100 mg/dL / Nitrite: Negative   Leuk Esterase: Negative / RBC: x / WBC 0-2   Sq Epi: x / Non Sq Epi: x / Bacteria: x    Assessment & Opinion:   Seizure Disorder, generalized.  Pneumonia.  ESRD.  Recommendations:  Adjust her anticonvulsant medications.  EEG.   DVT prophylaxis as ordered.  Continue all current medications. Keppra 500 mg BID po
INTERVAL HPI/OVERNIGHT EVENTS:  Subjective Complaints:  Patient shaking her head for mild headache.  Alert and  oriented and talking.  Appears in no acute distress.  Exhibits no focal motor sensory deficits.      NEUROLOGICAL EXAM (Pertinent):  Vital Signs Last 24 Hrs  T(C): 37.7 (31 Jul 2017 17:09), Max: 37.7 (31 Jul 2017 17:09)  T(F): 99.8 (31 Jul 2017 17:09), Max: 99.8 (31 Jul 2017 17:09)  HR: 108 (31 Jul 2017 17:09) (77 - 108)  BP: 151/57 (31 Jul 2017 17:09) (151/57 - 169/58)  BP(mean): --  RR: 19 (31 Jul 2017 17:09) (16 - 19)  SpO2: 97% (31 Jul 2017 17:09) (96% - 99%)    MEDICATIONS  (STANDING):  aspirin  chewable 81 milliGRAM(s) Oral daily  atorvastatin 40 milliGRAM(s) Oral at bedtime  clopidogrel Tablet 75 milliGRAM(s) Oral daily  amLODIPine   Tablet 10 milliGRAM(s) Oral daily  hydrALAZINE 50 milliGRAM(s) Oral two times a day  cefTRIAXone   IVPB 1 Gram(s) IV Intermittent every 24 hours  azithromycin   Tablet 500 milliGRAM(s) Oral daily  insulin lispro (HumaLOG) corrective regimen sliding scale   SubCutaneous three times a day before meals  insulin lispro (HumaLOG) corrective regimen sliding scale   SubCutaneous at bedtime  dextrose 5%. 1000 milliLiter(s) (50 mL/Hr) IV Continuous <Continuous>  dextrose 50% Injectable 12.5 Gram(s) IV Push once  dextrose 50% Injectable 25 Gram(s) IV Push once  dextrose 50% Injectable 25 Gram(s) IV Push once  levETIRAcetam 500 milliGRAM(s) Oral two times a day  heparin  Injectable 5000 Unit(s) SubCutaneous every 12 hours  acetaminophen   Tablet 650 milliGRAM(s) Oral once  acetaminophen   Tablet 650 milliGRAM(s) Oral once    MEDICATIONS  (PRN):  ALBUTerol/ipratropium for Nebulization 3 milliLiter(s) Nebulizer every 6 hours PRN Shortness of Breath and/or Wheezing  acetaminophen   Tablet 650 milliGRAM(s) Oral every 6 hours PRN For Temp greater than 38 C (100.4 F)  guaiFENesin    Syrup 200 milliGRAM(s) Oral every 6 hours PRN Cough  dextrose Gel 1 Dose(s) Oral once PRN Blood Glucose LESS THAN 70 milliGRAM(s)/deciliter  glucagon  Injectable 1 milliGRAM(s) IntraMuscular once PRN Glucose LESS THAN 70 milligrams/deciliter    LABS:                        10.4   11.3  )-----------( 233      ( 31 Jul 2017 12:01 )             33.0     07-31    137  |  101  |  63<H>  ----------------------------<  122<H>  4.8   |  21<L>  |  6.40<H>    Ca    9.5      31 Jul 2017 12:01    TPro  7.5  /  Alb  3.0<L>  /  TBili  0.4  /  DBili  x   /  AST  18  /  ALT  21  /  AlkPhos  79  07-31    Assessment & Opinion:   Seizure Disorder, generalized.   ESRD on HD.  Recommendations:  Adjust her anticonvulsant medications.   DVT prophylaxis as ordered.  Continue all current medications. Keppra 500 mg BID po
INTERVAL HPI/OVERNIGHT EVENTS:  Subjective Complaints: Offers no complaints.  No seizure recurrence. Pt lying in bed in no acute distress.  AOX3; speech clear.  Exhibits no focal motor sensory deficits.  EEG pending.    RECENT RADIOLOGY & ADDITIONAL TESTS:    NEUROLOGICAL EXAM (Pertinent):  Vital Signs Last 24 Hrs  T(C): 36.9 (30 Jul 2017 12:37), Max: 37.1 (29 Jul 2017 18:10)  T(F): 98.4 (30 Jul 2017 12:37), Max: 98.8 (29 Jul 2017 18:10)  HR: 78 (30 Jul 2017 12:37) (75 - 85)  BP: 139/48 (30 Jul 2017 12:37) (132/59 - 150/64)  BP(mean): --  RR: 18 (30 Jul 2017 12:37) (18 - 18)  SpO2: 97% (30 Jul 2017 12:37) (95% - 98%)      MEDICATIONS  (STANDING):  aspirin  chewable 81 milliGRAM(s) Oral daily  atorvastatin 40 milliGRAM(s) Oral at bedtime  clopidogrel Tablet 75 milliGRAM(s) Oral daily  amLODIPine   Tablet 10 milliGRAM(s) Oral daily  hydrALAZINE 50 milliGRAM(s) Oral two times a day  cefTRIAXone   IVPB 1 Gram(s) IV Intermittent every 24 hours  azithromycin   Tablet 500 milliGRAM(s) Oral daily  insulin lispro (HumaLOG) corrective regimen sliding scale   SubCutaneous three times a day before meals  insulin lispro (HumaLOG) corrective regimen sliding scale   SubCutaneous at bedtime  dextrose 5%. 1000 milliLiter(s) (50 mL/Hr) IV Continuous <Continuous>  dextrose 50% Injectable 12.5 Gram(s) IV Push once  dextrose 50% Injectable 25 Gram(s) IV Push once  dextrose 50% Injectable 25 Gram(s) IV Push once  levETIRAcetam 500 milliGRAM(s) Oral two times a day  heparin  Injectable 5000 Unit(s) SubCutaneous every 12 hours    MEDICATIONS  (PRN):  ALBUTerol/ipratropium for Nebulization 3 milliLiter(s) Nebulizer every 6 hours PRN Shortness of Breath and/or Wheezing  acetaminophen   Tablet 650 milliGRAM(s) Oral every 6 hours PRN For Temp greater than 38 C (100.4 F)  guaiFENesin    Syrup 200 milliGRAM(s) Oral every 6 hours PRN Cough  dextrose Gel 1 Dose(s) Oral once PRN Blood Glucose LESS THAN 70 milliGRAM(s)/deciliter  glucagon  Injectable 1 milliGRAM(s) IntraMuscular once PRN Glucose LESS THAN 70 milligrams/deciliter    LABS:                        11.3   9.8   )-----------( 222      ( 30 Jul 2017 07:33 )             35.2     07-30    137  |  101  |  45<H>  ----------------------------<  144<H>  4.8   |  25  |  5.17<H>    Ca    9.4      30 Jul 2017 07:33    TPro  7.2  /  Alb  2.8<L>  /  TBili  0.5  /  DBili  x   /  AST  21  /  ALT  19  /  AlkPhos  78  07-30    Assessment & Opinion:   Seizure Disorder, generalized.  Pneumonia.  ESRD.  Recommendations:  Adjust her anticonvulsant medications.  EEG.   DVT prophylaxis as ordered.  Continue all current medications. Keppra 500 mg BID po
Patient for HD today. No new events.  Tolerating HD treatments well.  HD prescription reviewed.        PHYSICAL EXAM:      T(C): 37.1 (07-31-17 @ 11:40), Max: 37.3 (07-31-17 @ 00:11)  HR: 92 (07-31-17 @ 11:40) (74 - 106)  BP: 162/77 (07-31-17 @ 11:40) (139/57 - 169/58)  RR: 16 (07-31-17 @ 11:40) (16 - 18)  SpO2: 96% (07-31-17 @ 11:40) (96% - 99%)  Wt(kg): --  Respiratory: clear anteriorly, decreased BS at bases  Cardiovascular: S1 S2  Gastrointestinal: soft NT ND +BS  Extremities:  tr  edema                                          10.4   11.3  )-----------( 233      ( 31 Jul 2017 12:01 )             33.0     07-31    137  |  101  |  63<H>  ----------------------------<  122<H>  4.8   |  21<L>  |  6.40<H>    Ca    9.5      31 Jul 2017 12:01    TPro  7.5  /  Alb  3.0<L>  /  TBili  0.4  /  DBili  x   /  AST  18  /  ALT  21  /  AlkPhos  79  07-31      Maintenance hemodialysis.  Blood pressure trends, BFR, AP, , UF goal reviewed.  Clinically stable.
Patient is a 86y old  Female who presents with a chief complaint of Seizures (2017 01:54)      INTERVAL HPI/OVERNIGHT EVENTS: no acute events overnight no complaints     MEDICATIONS  (STANDING):  aspirin  chewable 81 milliGRAM(s) Oral daily  atorvastatin 40 milliGRAM(s) Oral at bedtime  clopidogrel Tablet 75 milliGRAM(s) Oral daily  amLODIPine   Tablet 10 milliGRAM(s) Oral daily  hydrALAZINE 50 milliGRAM(s) Oral two times a day  cefTRIAXone   IVPB 1 Gram(s) IV Intermittent every 24 hours  azithromycin   Tablet 500 milliGRAM(s) Oral daily  insulin lispro (HumaLOG) corrective regimen sliding scale   SubCutaneous three times a day before meals  insulin lispro (HumaLOG) corrective regimen sliding scale   SubCutaneous at bedtime  dextrose 5%. 1000 milliLiter(s) (50 mL/Hr) IV Continuous <Continuous>  dextrose 50% Injectable 12.5 Gram(s) IV Push once  dextrose 50% Injectable 25 Gram(s) IV Push once  dextrose 50% Injectable 25 Gram(s) IV Push once  levETIRAcetam 500 milliGRAM(s) Oral two times a day    MEDICATIONS  (PRN):  ALBUTerol/ipratropium for Nebulization 3 milliLiter(s) Nebulizer every 6 hours PRN Shortness of Breath and/or Wheezing  acetaminophen   Tablet 650 milliGRAM(s) Oral every 6 hours PRN For Temp greater than 38 C (100.4 F)  guaiFENesin    Syrup 200 milliGRAM(s) Oral every 6 hours PRN Cough  dextrose Gel 1 Dose(s) Oral once PRN Blood Glucose LESS THAN 70 milliGRAM(s)/deciliter  glucagon  Injectable 1 milliGRAM(s) IntraMuscular once PRN Glucose LESS THAN 70 milligrams/deciliter      Allergies    No Known Allergies    Intolerances        REVIEW OF SYSTEMS:  CONSTITUTIONAL: weak in the legs   EYES: No eye pain, visual disturbances, or discharge  ENMT:  No difficulty hearing, tinnitus, vertigo; No sinus or throat pain  NECK: No pain or stiffness  BREASTS: No pain, masses, or nipple discharge  RESPIRATORY: No cough, wheezing, chills or hemoptysis; No shortness of breath  CARDIOVASCULAR: No chest pain, palpitations, dizziness, or leg swelling  GASTROINTESTINAL: No abdominal or epigastric pain. No nausea, vomiting, or hematemesis; No diarrhea or constipation. No melena or hematochezia.  GENITOURINARY: No dysuria, frequency, hematuria, or incontinence  NEUROLOGICAL: dementia confused   SKIN: No itching, burning, rashes, or lesions   LYMPH NODES: No enlarged glands  ENDOCRINE: No heat or cold intolerance; No hair loss  MUSCULOSKELETAL: No joint pain or swelling; No muscle, back, or extremity pain  PSYCHIATRIC:HEME/LYMPH: No easy bruising, or bleeding gums  ALLERGY AND IMMUNOLOGIC: No hives or eczema    Vital Signs Last 24 Hrs  T(C): 37.1 (2017 11:15), Max: 37.1 (2017 11:15)  T(F): 98.8 (2017 11:15), Max: 98.8 (2017 11:15)  HR: 82 (2017 11:15) (81 - 103)  BP: 144/51 (2017 11:15) (138/48 - 160/68)  BP(mean): --  RR: 18 (2017 11:15) (17 - 19)  SpO2: 96% (2017 11:15) (95% - 99%)    PHYSICAL EXAM:  GENERAL: NAD, well-groomed, well-developed  HEAD:  Atraumatic, Normocephalic  EYES: EOMI, PERRLA, conjunctiva and sclera clear  ENMT: No tonsillar erythema, exudates, or enlargement; Moist mucous membranes, Good dentition, No lesions  NECK: Supple, No JVD, Normal thyroid  NERVOUS SYSTEM:  Alert & Oriented X2, poor concentration; M  CHEST/LUNG: Clear to percussion bilaterally; No rales, rhonchi, wheezing, or rubs  HEART: Regular rate and rhythm; No murmurs, rubs, or gallops  ABDOMEN: Soft, Nontender, Nondistended; Bowel sounds present  EXTREMITIES:  dialysis access   LYMPH: No lymphadenopathy noted  SKIN: No rashes or lesions    LABS:                        11.4   9.8   )-----------( 216      ( 2017 07:26 )             36.6     07-29    142  |  105  |  18  ----------------------------<  116<H>  4.2   |  29  |  3.23<H>    Ca    9.3      2017 07:26    TPro  8.1  /  Alb  3.2<L>  /  TBili  0.3  /  DBili  x   /  AST  19  /  ALT  19  /  AlkPhos  134<H>  07-      Urinalysis Basic - ( 2017 01:25 )    Color: Yellow / Appearance: Clear / S.015 / pH: x  Gluc: x / Ketone: Negative  / Bili: Negative / Urobili: Negative mg/dL   Blood: x / Protein: 100 mg/dL / Nitrite: Negative   Leuk Esterase: Negative / RBC: x / WBC 0-2   Sq Epi: x / Non Sq Epi: x / Bacteria: x      CAPILLARY BLOOD GLUCOSE  111 (2017 08:05)  265 (2017 21:42)    < from: CT Chest No Cont (17 @ 09:13) >  FINDINGS: There is no axillary, mediastinal, or hilar lymphadenopathy. A   few subcentimeter sized lymph nodes are notable within the mediastinum.    The heart sizeis enlarged. The pericardium appears unremarkable. There   are atherosclerotic calcifications of the imaged coronary arteries,   aorta, and branch vessels. The imaged portions of the aorta are normal in   caliber. A stent is partially visualized within a left subclavian vessel.    The central airways remain patent. There is no lobar consolidation or   pleural effusion. Faint groundglass opacities are notable throughout the   bilateral upper lobes with minimal interlobular septal thickening. Trace  bilateral pleural effusions are visualized. The previously noted 6 mm   pulmonary nodule within the right lower lobe is no longer seen.    Intraluminal gallstones are noted. A right-sided renal cyst is noted.   There is bilateral cortical renal atrophy. There is a small hiatal   hernia. The other visualized upper abdominal organs appear unremarkable.    Multilevel degenerative changes are noted within the imaged potions of   the spine. Old right-sided rib fractures noted.    IMPRESSION: Very mild pulmonary edema with trace bilateral pleural   effusions.    < end of copied text >        RADIOLOGY & ADDITIONAL TESTS:    Imaging Personally Reviewed:  [ X] YES  [ ] NO    Consultant(s) Notes Reviewed:  [X ] YES  [ ] NO    Care Discussed with Consultants/Other Providers [X ] YES  [ ] NO
Patient is a 86y old  Female who presents with a chief complaint of Seizures (2017 01:54)    HPI:  86 year old woman with PMH of HTN, DM-2, Seizure d/o (was on Keppra in the past, DCed after 2 Seizure-free years), ESRD on HD MWF brought in by family for Seizure episode at home.  Family is not available a the time of encounter, patient lethargic s/p Seizure and antiepileptics given in ED so information obtained from ED and from chart.  4 limb shaking was witnessed by daughter today at home.  As per son, patient has had a bad cough for the last several days.  No other history is available.    In the ED, CT head shows no acute changes.  BNP 5050, troponin mildly elevated at 0.065.  Kidney function at baseline.  UA neg for UTI.  CXR shows LLL opacity, official read pending. (2017 01:54)    Currently stable, no distress.    PAST MEDICAL & SURGICAL HISTORY:  LBBB (left bundle branch block)  Seizure  H/O: Hypothyroidism  Hypertension  Acute Renal Insufficiency  CHF (Congestive Heart Failure)  Diabetes Mellitus  Dyslipidemia  S/P cardiac cath: 1 stent 10 years ago  Diabetes  FH: HTN (hypertension)  Acute CHF  No Past Surgical History    FAMILY HISTORY:  No pertinent family history in first degree relatives    No Known Allergies    MEDICATIONS  (STANDING):  aspirin  chewable 81 milliGRAM(s) Oral daily  atorvastatin 40 milliGRAM(s) Oral at bedtime  clopidogrel Tablet 75 milliGRAM(s) Oral daily  amLODIPine   Tablet 10 milliGRAM(s) Oral daily  hydrALAZINE 50 milliGRAM(s) Oral two times a day  dextrose 5% + sodium chloride 0.45%. 1000 milliLiter(s) (50 mL/Hr) IV Continuous <Continuous>  cefTRIAXone   IVPB 1 Gram(s) IV Intermittent every 24 hours  azithromycin   Tablet 500 milliGRAM(s) Oral daily  insulin lispro (HumaLOG) corrective regimen sliding scale   SubCutaneous three times a day before meals  insulin lispro (HumaLOG) corrective regimen sliding scale   SubCutaneous at bedtime  dextrose 5%. 1000 milliLiter(s) (50 mL/Hr) IV Continuous <Continuous>  dextrose 50% Injectable 12.5 Gram(s) IV Push once  dextrose 50% Injectable 25 Gram(s) IV Push once  dextrose 50% Injectable 25 Gram(s) IV Push once  levETIRAcetam 500 milliGRAM(s) Oral two times a day    MEDICATIONS  (PRN):  ALBUTerol/ipratropium for Nebulization 3 milliLiter(s) Nebulizer every 6 hours PRN Shortness of Breath and/or Wheezing  acetaminophen   Tablet 650 milliGRAM(s) Oral every 6 hours PRN For Temp greater than 38 C (100.4 F)  guaiFENesin    Syrup 200 milliGRAM(s) Oral every 6 hours PRN Cough  dextrose Gel 1 Dose(s) Oral once PRN Blood Glucose LESS THAN 70 milliGRAM(s)/deciliter  glucagon  Injectable 1 milliGRAM(s) IntraMuscular once PRN Glucose LESS THAN 70 milligrams/deciliter    Vital Signs Last 24 Hrs  T(C): 37.1 (2017 11:00), Max: 37.1 (2017 11:00)  T(F): 98.8 (2017 11:00), Max: 98.8 (2017 11:00)  HR: 84 (2017 11:00) (84 - 117)  BP: 132/60 (2017 11:00) (122/91 - 155/84)  BP(mean): --  RR: 18 (2017 11:00) (16 - 20)  SpO2: 98% (2017 11:00) (97% - 98%)    CAPILLARY BLOOD GLUCOSE  152 (2017 11:17)  171 (2017 08:15)  267 (2017 22:47)        PHYSICAL EXAM:      T(C): 37.1 (2017 11:00), Max: 37.1 (2017 11:00)  HR: 84 (2017 11:00) (84 - 117)  BP: 132/60 (2017 11:00) (122/91 - 155/84)  RR: 18 (2017 11:00) (16 - 20)  SpO2: 98% (2017 11:00) (97% - 98%)  Wt(kg): --  Respiratory: clear anteriorly, decreased BS at bases L>R  Cardiovascular: S1 S2  Gastrointestinal: soft NT ND +BS  Extremities:   tr edema av access + BT                  139  |  101  |  42<H>  ----------------------------<  161<H>  3.7   |  28  |  5.27<H>    Ca    9.8      2017 08:55    TPro  8.1  /  Alb  3.2<L>  /  TBili  0.3  /  DBili  x   /  AST  19  /  ALT  19  /  AlkPhos  134<H>  07-27                          10.1   11.3  )-----------( 144      ( 2017 08:55 )             32.2     Urinalysis Basic - ( 2017 01:25 )    Color: Yellow / Appearance: Clear / S.015 / pH: x  Gluc: x / Ketone: Negative  / Bili: Negative / Urobili: Negative mg/dL   Blood: x / Protein: 100 mg/dL / Nitrite: Negative   Leuk Esterase: Negative / RBC: x / WBC 0-2   Sq Epi: x / Non Sq Epi: x / Bacteria: x            Assessment and Plan    Maintenance HD;   Assess LLL opacity with CT.  Neurology evaluation.  Will follow.
Patient is a 86y old  Female who presents with a chief complaint of Seizures (28 Jul 2017 01:54)      INTERVAL HPI/OVERNIGHT EVENTS: no acute events overnight is     MEDICATIONS  (STANDING):  aspirin  chewable 81 milliGRAM(s) Oral daily  atorvastatin 40 milliGRAM(s) Oral at bedtime  clopidogrel Tablet 75 milliGRAM(s) Oral daily  amLODIPine   Tablet 10 milliGRAM(s) Oral daily  hydrALAZINE 50 milliGRAM(s) Oral two times a day  cefTRIAXone   IVPB 1 Gram(s) IV Intermittent every 24 hours  azithromycin   Tablet 500 milliGRAM(s) Oral daily  insulin lispro (HumaLOG) corrective regimen sliding scale   SubCutaneous three times a day before meals  insulin lispro (HumaLOG) corrective regimen sliding scale   SubCutaneous at bedtime  dextrose 5%. 1000 milliLiter(s) (50 mL/Hr) IV Continuous <Continuous>  dextrose 50% Injectable 12.5 Gram(s) IV Push once  dextrose 50% Injectable 25 Gram(s) IV Push once  dextrose 50% Injectable 25 Gram(s) IV Push once  levETIRAcetam 500 milliGRAM(s) Oral two times a day  heparin  Injectable 5000 Unit(s) SubCutaneous every 12 hours    MEDICATIONS  (PRN):  ALBUTerol/ipratropium for Nebulization 3 milliLiter(s) Nebulizer every 6 hours PRN Shortness of Breath and/or Wheezing  acetaminophen   Tablet 650 milliGRAM(s) Oral every 6 hours PRN For Temp greater than 38 C (100.4 F)  guaiFENesin    Syrup 200 milliGRAM(s) Oral every 6 hours PRN Cough  dextrose Gel 1 Dose(s) Oral once PRN Blood Glucose LESS THAN 70 milliGRAM(s)/deciliter  glucagon  Injectable 1 milliGRAM(s) IntraMuscular once PRN Glucose LESS THAN 70 milligrams/deciliter      Allergies    No Known Allergies    Intolerances        REVIEW OF SYSTEMS:  CONSTITUTIONAL: No fever, weight loss, or fatigue  EYES: No eye pain, visual disturbances, or discharge  ENMT:  No difficulty hearing, tinnitus, vertigo; No sinus or throat pain  NECK: No pain or stiffness  BREASTS: No pain, masses, or nipple discharge  RESPIRATORY: No cough, wheezing, chills or hemoptysis; No shortness of breath  CARDIOVASCULAR: No chest pain, palpitations, dizziness, or leg swelling  GASTROINTESTINAL: No abdominal or epigastric pain. No nausea, vomiting, or hematemesis; No diarrhea or constipation. No melena or hematochezia.  GENITOURINARY: No dysuria, frequency, hematuria, or incontinence  NEUROLOGICAL: No headaches, memory loss, loss of strength, numbness, or tremors  SKIN: No itching, burning, rashes, or lesions   LYMPH NODES: No enlarged glands  ENDOCRINE: No heat or cold intolerance; No hair loss  MUSCULOSKELETAL: No joint pain or swelling; No muscle, back, or extremity pain  PSYCHIATRIC: No depression, anxiety, mood swings, or difficulty sleeping  HEME/LYMPH: No easy bruising, or bleeding gums  ALLERGY AND IMMUNOLOGIC: No hives or eczema    Vital Signs Last 24 Hrs  T(C): 37 (30 Jul 2017 05:26), Max: 37.1 (29 Jul 2017 11:15)  T(F): 98.6 (30 Jul 2017 05:26), Max: 98.8 (29 Jul 2017 11:15)  HR: 79 (30 Jul 2017 05:26) (75 - 85)  BP: 138/58 (30 Jul 2017 05:26) (132/59 - 150/64)  BP(mean): --  RR: 18 (30 Jul 2017 05:26) (18 - 18)  SpO2: 98% (30 Jul 2017 05:26) (95% - 98%)    PHYSICAL EXAM:  GENERAL: NAD, well-groomed, well-developed  HEAD:  Atraumatic, Normocephalic  EYES: EOMI, PERRLA, conjunctiva and sclera clear  ENMT: No tonsillar erythema, exudates, or enlargement; Moist mucous membranes, Good dentition, No lesions  NECK: Supple, No JVD, Normal thyroid  NERVOUS SYSTEM:  Alert & Oriented X3, Good concentration; Motor Strength 5/5 B/L upper and lower extremities; DTRs 2+ intact and symmetric  CHEST/LUNG: Clear to percussion bilaterally; No rales, rhonchi, wheezing, or rubs  HEART: Regular rate and rhythm; No murmurs, rubs, or gallops  ABDOMEN: Soft, Nontender, Nondistended; Bowel sounds present  EXTREMITIES:  2+ Peripheral Pulses, No clubbing, cyanosis, or edema  LYMPH: No lymphadenopathy noted  SKIN: No rashes or lesions    LABS:                        11.3   9.8   )-----------( 222      ( 30 Jul 2017 07:33 )             35.2     07-30    137  |  101  |  45<H>  ----------------------------<  144<H>  4.8   |  25  |  5.17<H>    Ca    9.4      30 Jul 2017 07:33    TPro  7.2  /  Alb  2.8<L>  /  TBili  0.5  /  DBili  x   /  AST  21  /  ALT  19  /  AlkPhos  78  07-30        CAPILLARY BLOOD GLUCOSE  129 (30 Jul 2017 08:28)  299 (29 Jul 2017 21:56)  142 (29 Jul 2017 17:07)  141 (29 Jul 2017 11:48)          RADIOLOGY & ADDITIONAL TESTS:    Imaging Personally Reviewed:  [ ] YES  [ ] NO    Consultant(s) Notes Reviewed:  [ ] YES  [ ] NO    Care Discussed with Consultants/Other Providers [ ] YES  [ ] NO
Patient seen in follow up for ESRD; appears comfortable.    MEDICATIONS  (STANDING):  aspirin  chewable 81 milliGRAM(s) Oral daily  atorvastatin 40 milliGRAM(s) Oral at bedtime  clopidogrel Tablet 75 milliGRAM(s) Oral daily  amLODIPine   Tablet 10 milliGRAM(s) Oral daily  hydrALAZINE 50 milliGRAM(s) Oral two times a day  cefTRIAXone   IVPB 1 Gram(s) IV Intermittent every 24 hours  azithromycin   Tablet 500 milliGRAM(s) Oral daily  insulin lispro (HumaLOG) corrective regimen sliding scale   SubCutaneous three times a day before meals  insulin lispro (HumaLOG) corrective regimen sliding scale   SubCutaneous at bedtime  dextrose 5%. 1000 milliLiter(s) (50 mL/Hr) IV Continuous <Continuous>  dextrose 50% Injectable 12.5 Gram(s) IV Push once  dextrose 50% Injectable 25 Gram(s) IV Push once  dextrose 50% Injectable 25 Gram(s) IV Push once  levETIRAcetam 500 milliGRAM(s) Oral two times a day  heparin  Injectable 5000 Unit(s) SubCutaneous every 12 hours    MEDICATIONS  (PRN):  ALBUTerol/ipratropium for Nebulization 3 milliLiter(s) Nebulizer every 6 hours PRN Shortness of Breath and/or Wheezing  acetaminophen   Tablet 650 milliGRAM(s) Oral every 6 hours PRN For Temp greater than 38 C (100.4 F)  guaiFENesin    Syrup 200 milliGRAM(s) Oral every 6 hours PRN Cough  dextrose Gel 1 Dose(s) Oral once PRN Blood Glucose LESS THAN 70 milliGRAM(s)/deciliter  glucagon  Injectable 1 milliGRAM(s) IntraMuscular once PRN Glucose LESS THAN 70 milligrams/deciliter    PHYSICAL EXAM:      T(C): 37.1 (07-29-17 @ 11:15), Max: 37.1 (07-29-17 @ 11:15)  HR: 82 (07-29-17 @ 11:15) (81 - 103)  BP: 144/51 (07-29-17 @ 11:15) (138/48 - 160/68)  RR: 18 (07-29-17 @ 11:15) (17 - 19)  SpO2: 96% (07-29-17 @ 11:15) (95% - 99%)  Wt(kg): --  Respiratory: clear anteriorly, decreased BS at bases  Cardiovascular: S1 S2  Gastrointestinal: soft NT ND +BS  Extremities:    tr edema L avf bt                                    11.4   9.8   )-----------( 216      ( 29 Jul 2017 07:26 )             36.6     07-29    142  |  105  |  18  ----------------------------<  116<H>  4.2   |  29  |  3.23<H>    Ca    9.3      29 Jul 2017 07:26    TPro  8.1  /  Alb  3.2<L>  /  TBili  0.3  /  DBili  x   /  AST  19  /  ALT  19  /  AlkPhos  134<H>  07-27      LIVER FUNCTIONS - ( 27 Jul 2017 22:58 )  Alb: 3.2 g/dL / Pro: 8.1 gm/dL / ALK PHOS: 134 U/L / ALT: 19 U/L / AST: 19 U/L / GGT: x             Assessment and Plan:    Clinically stable; HD MWF.
Patient seen in follow up for ESRD; no distress, family at bedside.    MEDICATIONS  (STANDING):  aspirin  chewable 81 milliGRAM(s) Oral daily  atorvastatin 40 milliGRAM(s) Oral at bedtime  clopidogrel Tablet 75 milliGRAM(s) Oral daily  amLODIPine   Tablet 10 milliGRAM(s) Oral daily  hydrALAZINE 50 milliGRAM(s) Oral two times a day  cefTRIAXone   IVPB 1 Gram(s) IV Intermittent every 24 hours  azithromycin   Tablet 500 milliGRAM(s) Oral daily  insulin lispro (HumaLOG) corrective regimen sliding scale   SubCutaneous three times a day before meals  insulin lispro (HumaLOG) corrective regimen sliding scale   SubCutaneous at bedtime  dextrose 5%. 1000 milliLiter(s) (50 mL/Hr) IV Continuous <Continuous>  dextrose 50% Injectable 12.5 Gram(s) IV Push once  dextrose 50% Injectable 25 Gram(s) IV Push once  dextrose 50% Injectable 25 Gram(s) IV Push once  levETIRAcetam 500 milliGRAM(s) Oral two times a day  heparin  Injectable 5000 Unit(s) SubCutaneous every 12 hours    MEDICATIONS  (PRN):  ALBUTerol/ipratropium for Nebulization 3 milliLiter(s) Nebulizer every 6 hours PRN Shortness of Breath and/or Wheezing  acetaminophen   Tablet 650 milliGRAM(s) Oral every 6 hours PRN For Temp greater than 38 C (100.4 F)  guaiFENesin    Syrup 200 milliGRAM(s) Oral every 6 hours PRN Cough  dextrose Gel 1 Dose(s) Oral once PRN Blood Glucose LESS THAN 70 milliGRAM(s)/deciliter  glucagon  Injectable 1 milliGRAM(s) IntraMuscular once PRN Glucose LESS THAN 70 milligrams/deciliter    PHYSICAL EXAM:      T(C): 36.9 (07-30-17 @ 12:37), Max: 37.1 (07-29-17 @ 18:10)  HR: 78 (07-30-17 @ 12:37) (75 - 85)  BP: 139/48 (07-30-17 @ 12:37) (132/59 - 150/64)  RR: 18 (07-30-17 @ 12:37) (18 - 18)  SpO2: 97% (07-30-17 @ 12:37) (95% - 98%)  Wt(kg): --  Respiratory: clear anteriorly, decreased BS at bases  Cardiovascular: S1 S2  Gastrointestinal: soft NT ND +BS  Extremities:  tr edema                                    11.3   9.8   )-----------( 222      ( 30 Jul 2017 07:33 )             35.2     07-30    137  |  101  |  45<H>  ----------------------------<  144<H>  4.8   |  25  |  5.17<H>    Ca    9.4      30 Jul 2017 07:33    TPro  7.2  /  Alb  2.8<L>  /  TBili  0.5  /  DBili  x   /  AST  21  /  ALT  19  /  AlkPhos  78  07-30      LIVER FUNCTIONS - ( 30 Jul 2017 07:33 )  Alb: 2.8 g/dL / Pro: 7.2 gm/dL / ALK PHOS: 78 U/L / ALT: 19 U/L / AST: 21 U/L / GGT: x             Assessment and Plan:  HD for AM; neuro follow up.

## 2017-07-31 NOTE — DISCHARGE NOTE ADULT - NSFTFSERV1RD_GEN_ALL_CORE
rehabilitation services rehabilitation services/medication teaching and assessment/teaching and training

## 2017-08-02 DIAGNOSIS — Z95.1 PRESENCE OF AORTOCORONARY BYPASS GRAFT: ICD-10-CM

## 2017-08-02 DIAGNOSIS — N18.6 END STAGE RENAL DISEASE: ICD-10-CM

## 2017-08-02 DIAGNOSIS — G40.909 EPILEPSY, UNSPECIFIED, NOT INTRACTABLE, WITHOUT STATUS EPILEPTICUS: ICD-10-CM

## 2017-08-02 DIAGNOSIS — I13.2 HYPERTENSIVE HEART AND CHRONIC KIDNEY DISEASE WITH HEART FAILURE AND WITH STAGE 5 CHRONIC KIDNEY DISEASE, OR END STAGE RENAL DISEASE: ICD-10-CM

## 2017-08-02 DIAGNOSIS — I50.42 CHRONIC COMBINED SYSTOLIC (CONGESTIVE) AND DIASTOLIC (CONGESTIVE) HEART FAILURE: ICD-10-CM

## 2017-08-02 DIAGNOSIS — Z99.2 DEPENDENCE ON RENAL DIALYSIS: ICD-10-CM

## 2017-08-02 DIAGNOSIS — J18.9 PNEUMONIA, UNSPECIFIED ORGANISM: ICD-10-CM

## 2017-08-02 DIAGNOSIS — E11.22 TYPE 2 DIABETES MELLITUS WITH DIABETIC CHRONIC KIDNEY DISEASE: ICD-10-CM

## 2017-08-02 LAB
CULTURE RESULTS: SIGNIFICANT CHANGE UP
CULTURE RESULTS: SIGNIFICANT CHANGE UP
SPECIMEN SOURCE: SIGNIFICANT CHANGE UP
SPECIMEN SOURCE: SIGNIFICANT CHANGE UP

## 2017-08-16 NOTE — DISCHARGE NOTE ADULT - ABILITY TO HEAR (WITH HEARING AID OR HEARING APPLIANCE IF NORMALLY USED):
"Paged Dr. Diaz, \"clarifying that pt should have the lisinopril tonight x1 & then start taking it tomorrow morning. \"    ADDENDUM: Per Dr. Diaz ok to take lisinopril tonight & then again tomorrow.   " Adequate: hears normal conversation without difficulty

## 2017-12-06 ENCOUNTER — EMERGENCY (EMERGENCY)
Facility: HOSPITAL | Age: 82
LOS: 0 days | End: 2017-12-06
Attending: EMERGENCY MEDICINE
Payer: MEDICARE

## 2017-12-06 VITALS — WEIGHT: 149.91 LBS | HEIGHT: 62 IN

## 2017-12-06 DIAGNOSIS — E03.9 HYPOTHYROIDISM, UNSPECIFIED: ICD-10-CM

## 2017-12-06 DIAGNOSIS — E11.9 TYPE 2 DIABETES MELLITUS WITHOUT COMPLICATIONS: ICD-10-CM

## 2017-12-06 DIAGNOSIS — E11.9 TYPE 2 DIABETES MELLITUS WITHOUT COMPLICATIONS: Chronic | ICD-10-CM

## 2017-12-06 DIAGNOSIS — I50.9 HEART FAILURE, UNSPECIFIED: Chronic | ICD-10-CM

## 2017-12-06 DIAGNOSIS — Z98.89 OTHER SPECIFIED POSTPROCEDURAL STATES: Chronic | ICD-10-CM

## 2017-12-06 DIAGNOSIS — I12.0 HYPERTENSIVE CHRONIC KIDNEY DISEASE WITH STAGE 5 CHRONIC KIDNEY DISEASE OR END STAGE RENAL DISEASE: ICD-10-CM

## 2017-12-06 DIAGNOSIS — I50.9 HEART FAILURE, UNSPECIFIED: ICD-10-CM

## 2017-12-06 DIAGNOSIS — I44.7 LEFT BUNDLE-BRANCH BLOCK, UNSPECIFIED: ICD-10-CM

## 2017-12-06 DIAGNOSIS — I46.9 CARDIAC ARREST, CAUSE UNSPECIFIED: ICD-10-CM

## 2017-12-06 DIAGNOSIS — Z82.49 FAMILY HISTORY OF ISCHEMIC HEART DISEASE AND OTHER DISEASES OF THE CIRCULATORY SYSTEM: Chronic | ICD-10-CM

## 2017-12-06 DIAGNOSIS — N18.6 END STAGE RENAL DISEASE: ICD-10-CM

## 2017-12-06 DIAGNOSIS — Z79.82 LONG TERM (CURRENT) USE OF ASPIRIN: ICD-10-CM

## 2017-12-06 DIAGNOSIS — E78.5 HYPERLIPIDEMIA, UNSPECIFIED: ICD-10-CM

## 2017-12-06 DIAGNOSIS — Z79.4 LONG TERM (CURRENT) USE OF INSULIN: ICD-10-CM

## 2017-12-06 LAB — GLUCOSE BLDC GLUCOMTR-MCNC: 153 MG/DL — HIGH (ref 70–99)

## 2017-12-06 PROCEDURE — 99291 CRITICAL CARE FIRST HOUR: CPT

## 2017-12-06 NOTE — ED PROVIDER NOTE - MEDICAL DECISION MAKING DETAILS
ACLS protocol continued from field (see code blue sheet).  No cardiac activity despite prolonged resuscitative efforts.  Bedside US showed no cardiac activity. No reflexes. no cardiac activity on monitor.  Time of death called. family notified.  ME notified.  called for family.

## 2017-12-06 NOTE — ED PROVIDER NOTE - PHYSICAL EXAMINATION
Gen: unresponsive  Head: NC, AT, fixed/dilated normal lids/conjunctiva  Neck: +supple, no tenderness/meningismus/JVD, +Trachea midline  Pulm: bilateral breath sounds with ambu-ventilation  CV: no cardiac activity on auscultation  Abd: soft, NT/ND, no hepatosplenomegaly  Mskel: no edema/erythema/cyanosis  Skin: no rash  Neuro: unresponsive to pain, pupils fixed, not moving extremities.

## 2017-12-06 NOTE — ED PROVIDER NOTE - OBJECTIVE STATEMENT
86yoF; with pmh signif for Seizure disorder, HTN, HLD, DM, ESRD on HD: now presenting in cardiac arrrest. 86yoF; with pmh signif for Seizure disorder, HTN, HLD, DM, ESRD on HD: now presenting in cardiac arrest.  patient's son states patient was seen on a security camera placed over the room to be having a seizure at 230am and 911 was called.  upon arrival of EMS, patient was found to be in PEA cardiac arrest, intubated in field, IV line placed, and acls protocol initiated, given epi x3.  tube confirmed upon arrival and acls protocol continued.   family states patient was otherwise fatigued over past 2 days and chills. denies n/v/d. denies cough. denies sick contacts.

## 2018-02-09 NOTE — PROCEDURE NOTE - PRACTITIONER PERFORMING THE TIME OUT
Message from [employer]:  Dirk Bojorquez would like a refill of the following medications:     sildenafil (VIAGRA) 100 MG tablet [Rigo Laar MD]    Preferred pharmacy: [facility] [address] [place] [address] [address] AT [facility]  Delivery method: Pickup       Gerri

## 2018-03-30 NOTE — H&P ADULT. - PROBLEM/PLAN-2
If you receive a survey via e-mail or mail, please take the time to complete and return as your feedback is very helpful to our practice.     ++++++++++    If your neurological testing is not going to be scheduled today our Pre-Service Department will contact you to schedule within one to two days. If you would like to call and schedule when it is convenient for you or if you need to reschedule your appointment please call the Pre-Service Department at [phone].    Your tests will be reviewed by the Benefits Department and if there are any concerns regarding prior authorization or financial obligation they will contact you. We recommend you still contact your insurance company to see if the test, provider, and location of service are covered, and if so, will there be any out of pocket expenses.     If you should have any concerns regarding the financial obligation of the tests please contact our Financial Advocates at [phone] and they will be happy to assist you.                 Thank you for letting us care for you today.     +++++++++++    In order to make sure we are meeting our patients' needs, we require a 36 hour notice from you prior to picking up your medications. Attached is a table that will help you determine when your prescriptions will be ready for pick-up.                      If the refill is requested between when the clinic opens and 12 pm on  You can  your prescription at the pharmacy after 6 PM on   Monday Tuesday Tuesday Wednesday Wednesday Thursday Thursday Friday Friday Monday     If the refill is requested between 12 pm and after the clinic closes on You can  your prescription at the pharmacy after 12 PM on   Monday Wednesday Tuesday Thursday Wednesday Friday Thursday Monday Friday Tuesday      DISPLAY PLAN FREE TEXT

## 2019-07-31 NOTE — ED PROVIDER NOTE - CPE EDP PSYCH NORM
swelling and pain to left leg since yesterday, noticed redness and warmth to area at 6pm tonight. painful to touch. seen at urgent care and sent to ED normal...

## 2019-09-09 NOTE — DIETITIAN INITIAL EVALUATION ADULT. - FLUIDS
From: Nathan Shepherd  To: Yulia Boateng MD  Sent: 9/8/2019 4:46 PM CDT  Subject: After-Visit Question    Hello,    I've attached a pdf of the dialysate ingredients Dr. Bender asked me for. I will send another email with a .jpg copy because sometimes the pdfs don't get through. Please call if neither work and I'll pay to have them faxed over or I can drop them off.    Thanks!  Nathan Shepherd  [phone]   [phone]

## 2019-12-26 NOTE — ED STATDOCS - NEUROLOGICAL, MLM
Nosebleed, Adult  A nosebleed is when blood comes out of the nose. Nosebleeds are common. Usually, they are not a sign of a serious condition.  Nosebleeds can happen if a small blood vessel in your nose starts to bleed or if the lining of your nose (mucous membrane) cracks. They are commonly caused by:  Allergies.Colds.Picking your nose.Blowing your nose too hard.An injury from sticking an object into your nose or getting hit in the nose.Dry or cold air.Less common causes of nosebleeds include:  Toxic fumes.Something abnormal in the nose or in the air-filled spaces in the bones of the face (sinuses).Growths in the nose, such as polyps.Medicines or conditions that cause blood to clot slowly.Certain illnesses or procedures that irritate or dry out the nasal passages.Follow these instructions at home:  When you have a nosebleed:        Sit down and tilt your head slightly forward.Use a clean towel or tissue to pinch your nostrils under the bony part of your nose. After 10 minutes, let go of your nose and see if bleeding starts again. Do not release pressure before that time. If there is still bleeding, repeat the pinching and holding for 10 minutes until the bleeding stops.Do not place tissues or gauze in the nose to stop bleeding.Avoid lying down and avoid tilting your head backward. That may make blood collect in the throat and cause gagging or coughing.Use a nasal spray decongestant to help with a nosebleed as told by your health care provider.Do not use petroleum jelly or mineral oil in your nose. It can drip into your lungs.After a nosebleed:     Avoid blowing your nose or sniffing for a number of hours.Avoid straining, lifting, or bending at the waist for several days. You may resume other normal activities as you are able.Use saline spray or a humidifier as told by your health care provider.Aspirin and blood thinners make bleeding more likely. If you are prescribed these medicines and you suffer from nosebleeds:  Ask your health care provider if you should stop taking the medicines or if you should adjust the dose.Do not stop taking medicines that your health care provider has recommended unless told by your health care provider.If your nosebleed was caused by dry mucous membranes, use over-the-counter saline nasal spray or gel. This will keep the mucous membranes moist and allow them to heal. If you must use a lubricant:  Choose one that is water-soluble.Use only as much as you need and use it only as often as needed.Do not lie down until several hours after you use it.Contact a health care provider if:  You have a fever.You get nosebleeds often or more often than usual.You bruise very easily.You have a nosebleed from having something stuck in your nose.You have bleeding in your mouth.You vomit or cough up brown material.You have a nosebleed after you start a new medicine.Get help right away if:  You have a nosebleed after a fall or a head injury.Your nosebleed does not go away after 20 minutes.You feel dizzy or weak.You have unusual bleeding from other parts of your body.You have unusual bruising on other parts of your body.You become sweaty.You vomit blood.This information is not intended to replace advice given to you by your health care provider. Make sure you discuss any questions you have with your health care provider.    Document Released: 09/27/2006 Document Revised: 04/24/2018 Document Reviewed: 07/04/2017  Elsevier Interactive Patient Education © 2019 Elsevier Inc.
sensation is normal and strength is normal.

## 2021-03-15 NOTE — PROCEDURE NOTE - NSINDICATIONS_GEN_A_CORE
Talked with Radha about getting her EGD scheduled with Dr. Clayton. After going through the screening questions, she did mention that she would prefer having MAC sedation compared to conscious sedation.    Message sent to Eliu Maxwell for further clarification .   
critical patient/arterial puncture to obtain ABG's
respiratory failure/respiratory distress

## 2021-06-15 NOTE — PHYSICAL THERAPY INITIAL EVALUATION ADULT - ORIENTATION, REHAB EVAL
muscles     []  Other     Total Treatment time       Time In: 1198         Time Out: 7776    Electronically signed by:   Madhu Liz PT place/person

## 2022-06-02 NOTE — PHYSICAL THERAPY INITIAL EVALUATION ADULT - REFERRING PHYSICIAN, REHAB EVAL
PATIENT HAS CALLED AND STATED HER   magnesium oxide (MAG-OX) 400 MG tablet HAS BEEN APPROVED AND SHE WILL BE PICKING UP TODAY.       Rowan Talley

## 2022-07-27 NOTE — SWALLOW BEDSIDE ASSESSMENT ADULT - SLP REFERRING PHYSICIAN
Initial SW/CM Assessment/Plan of Care Note     Baseline Assessment  77 year old admitted 7/26/2022 as Inpatient.  Prior to admission patient was living with Alone and residing at Apartment.  Patient’s Primary Care Provider is Lisa Thrasher MD.       Prior to Admission Status  Functional Status  Ambulation: Other (comment)  Bathing: Family  Dressing: Family  Toileting: Incontinent  Meal Preparation: Family  Shopping: Family  Medication Preparation: Family  Medication Administration: Family  Housekeeping: Homemaker  Laundry: Homemaker  Transportation: Family    Agency/Support  Type of Services Prior to Hospitalization: Community agency/program, Nurse (specify)  Support Systems: Family members  Home Devices/Equipment: None  Mobility Assist Devices: Ceiling lift  Sensory Support Devices: Dentures      Barriers to Discharge  Identified Barriers to Discharge/Transition Planning:      Progress Note   Tx from OSH with sepsis.Havinf frak blood in colostomy. Hx htn,MS,decubitus ulcer s/p diverting colostomy, chronic mata,fistula, recurrent uti's. Lives alone on 1st floor, grandtr on 2nd floor who assists with care. Has a homemaker 5 days/5hrs. 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    Discharge plan discussed with: maxim damian from [place].    Patient/Family stated discharge goal (s):  Patient/Family Discharge Goal: Home    Plan  SW/CM - Recommendations for Discharge:  tbd    Refer to SW/CM Flowsheet for Goals and objective data.        Elise Perez

## 2022-08-22 NOTE — PROGRESS NOTE ADULT - PROBLEM SELECTOR PROBLEM 4
Addended by: GLORIA SERNA on: 8/22/2022 04:58 PM     Modules accepted: Orders    
ESRD (end stage renal disease) on dialysis
Essential hypertension
ESRD (end stage renal disease) on dialysis
Essential hypertension

## 2022-10-17 NOTE — ED ADULT TRIAGE NOTE - HEART RATE (BEATS/MIN)
117 Consent (Ear)/Introductory Paragraph: The rationale for Mohs was explained to the patient and consent was obtained. The risks, benefits and alternatives to therapy were discussed in detail. Specifically, the risks of ear deformity, infection, scarring, bleeding, prolonged wound healing, incomplete removal, allergy to anesthesia, nerve injury and recurrence were addressed. Prior to the procedure, the treatment site was clearly identified and confirmed by the patient. All components of Universal Protocol/PAUSE Rule completed.

## 2023-05-02 NOTE — SWALLOW BEDSIDE ASSESSMENT ADULT - SWALLOW EVAL: STRUCTURAL ABNORMALITIES
"Mother states during last OV, seeing specialist for epilepsy was discussed and requesting referral be sent.      Per Last OV note: "EMU not felt necessary but I did bring up to them"    Phone: [phone]  " none present

## 2023-08-22 NOTE — PATIENT PROFILE ADULT. - MEDICATIONS BROUGHT TO HOSPITAL, PROFILE
Type 2 diabetes mellitus with hyperglycemia, without long-term current use of insulin Left shoulder pain Left shoulder pain Type 2 diabetes mellitus with hyperglycemia, without long-term current use of insulin Left shoulder pain Type 2 diabetes mellitus with hyperglycemia, without long-term current use of insulin Left shoulder pain Type 2 diabetes mellitus with hyperglycemia, without long-term current use of insulin Left shoulder pain Left shoulder pain Left shoulder pain Left shoulder pain unknown, pt nonreceptive to  but did not [facility] any [facility]

## 2023-09-05 NOTE — ED ADULT NURSE NOTE - CAS EDN DISCHARGE ASSESSMENT
Lab: [id number] Lab: [id number] Awake/Symptoms improved/No adverse reaction to first time med in ED/Patient baseline mental status

## 2024-03-20 NOTE — DISCHARGE NOTE ADULT - WEIGHT IN LBS
Please follow up with Primary Care Physician appointment.    Date: March 25th  Time: 1 PM  Address: [facility] [address] [place] [facility]  Phone:[phone]   [phone]

## 2024-09-26 NOTE — DISCHARGE NOTE ADULT - MEDICATION SUMMARY - MEDICATIONS TO TAKE
The following are the instructions for home care, to inform you about your treatment and to help you with comfort and to control the pain and symptoms.    Please proceed to Emergency Room at any time if the medical condition would worsen significantly.    Sleeping in more upright position is recommended.     You may consider over-the-counter OTC remedies as needed as advised by Pharmacist.  Consider Tylenol.     Please continue oral hydration to prevent dehydration.    Please contact a Primary Care Provider PCP to reevaluate the recovery process as needed.    Please call back with any additional questions to make sure that your needs are attended and all of your questions have been answered.   If any tests have been performed and the test results are not available at this time, we will notify you about the test results as soon as they become available. You may also see your test results at [email or web address]     I will START or STAY ON the medications listed below when I get home from the hospital:    metroNIDAZOLE 500 mg oral tablet  -- 1 tab(s) by mouth every 8 hours  -- Indication: For Diarrhea of presumed infectious origin    aspirin 81 mg oral tablet  -- 1 tab(s) by mouth once a day  -- Indication: For Coronary artery disease    insulin glargine  -- 5 unit(s) subcutaneous once a day (at bedtime)  -- Indication: For Diabetes Mellitus    atorvastatin 40 mg oral tablet  -- 1 tab(s) by mouth once a day (at bedtime)  -- Indication: For Dyslipidemia    clopidogrel 75 mg oral tablet  -- 1 tab(s) by mouth once a day  -- Indication: For Coronary artery disease    amLODIPine 10 mg oral tablet  -- 1 tab(s) by mouth once a day  -- Indication: For Essential hypertension    pantoprazole 40 mg oral delayed release tablet  -- 1 tab(s) by mouth once a day (before a meal)  -- Indication: For reflux    hydrALAZINE 50 mg oral tablet  -- 1 tab(s) by mouth 2 times a day  -- Indication: For Essential hypertension

## 2024-11-12 NOTE — DIETITIAN INITIAL EVALUATION ADULT. - OTHER INFO
Pt seen for consult - dialysis. Unable to interview pt 2/2 medical status; no family present. Per chart info, pt lives in home c HHA assistance; daughter lives nearby and supervising mom's well-being. Pt c DM; takes Lantus (10 units x 1/day) for BG control. No reports of N/V/D; no BM since admit. Per nut assessment (11/18) pt wt 52.4 kg; pt has gained wt since that time. Pt c poor dentition; will recommend altered consistency for ease of eating when/if pt placed on PO diet. Never smoker

## 2024-12-10 NOTE — PHYSICAL THERAPY INITIAL EVALUATION ADULT - LEVEL OF INDEPENDENCE: STAND/SIT, REHAB EVAL
CERTIFICATE OF SCHOOL       December 10, 2024      Re: Sahara Youssef  [address]   [place] [address]      This is to certify that Sahara Youssef has been under my care from 12/10/2024 and is excused from work on 12/10/2024.    RESTRICTIONS: n/a      REMARKS: Martell was seen in our office today.        SIGNATURE:___________________________________________          Min Martinez DC  [facility]  [address] [place] [id number]  Phone: [phone]  
minimum assist (75% patients effort)

## 2025-06-27 NOTE — ED ADULT NURSE NOTE - CCCP TRG CHIEF CMPLNT
Chief Complaint   Patient presents with    Heat Exposure     Pt was at an event which was hot and has heat exhaustion accompanied with hot and cold flashes.    Spasms     Pt complains of spasm in the body     \"Have you been to the ER, urgent care clinic since your last visit?  Hospitalized since your last visit?\"    NO    “Have you seen or consulted any other health care providers outside of [facility] since your last visit?”    NO        “Have you had a pap smear?”    NO    No cervical cancer screening on file                 1/14/2025    12:50 PM   PHQ-9    Little interest or pleasure in doing things 0   Feeling down, depressed, or hopeless 0   PHQ-2 Score 0   PHQ-9 Total Score 0           Financial Resource Strain: Low Risk  (8/9/2024)    Overall Financial Resource Strain (CARDIA)     Difficulty of Paying Living Expenses: Not hard at all      Food Insecurity: No Food Insecurity (6/27/2025)    Hunger Vital Sign     Worried About Running Out of Food in the Last Year: Never true     Ran Out of Food in the Last Year: Never true          Health Maintenance Due   Topic Date Due    Polio vaccine (2 of 3 - 4-dose series) 04/17/2007    Meningococcal B vaccine (2 of 2 - Trumenba SCDM 2-dose series) 11/24/2021    Hepatitis B vaccine (1 of 3 - 19+ 3-dose series) Never done    Pap smear  Never done    COVID-19 Vaccine (2 - 2024-25 season) 09/01/2024        altered mental status